# Patient Record
Sex: FEMALE | Race: BLACK OR AFRICAN AMERICAN | NOT HISPANIC OR LATINO | Employment: OTHER | URBAN - METROPOLITAN AREA
[De-identification: names, ages, dates, MRNs, and addresses within clinical notes are randomized per-mention and may not be internally consistent; named-entity substitution may affect disease eponyms.]

---

## 2023-07-26 ENCOUNTER — OFFICE VISIT (OUTPATIENT)
Dept: PRIMARY CARE | Facility: CLINIC | Age: 53
End: 2023-07-26
Payer: MEDICARE

## 2023-07-26 VITALS
DIASTOLIC BLOOD PRESSURE: 70 MMHG | HEART RATE: 69 BPM | SYSTOLIC BLOOD PRESSURE: 127 MMHG | BODY MASS INDEX: 34.83 KG/M2 | WEIGHT: 204 LBS | HEIGHT: 64 IN

## 2023-07-26 DIAGNOSIS — I38 SYSTOLIC CONGESTIVE HEART FAILURE DUE TO VALVULAR DISEASE (MULTI): ICD-10-CM

## 2023-07-26 DIAGNOSIS — I50.20 SYSTOLIC CONGESTIVE HEART FAILURE DUE TO VALVULAR DISEASE (MULTI): ICD-10-CM

## 2023-07-26 DIAGNOSIS — I25.10 CORONARY ARTERY DISEASE WITHOUT ANGINA PECTORIS, UNSPECIFIED VESSEL OR LESION TYPE, UNSPECIFIED WHETHER NATIVE OR TRANSPLANTED HEART: Primary | ICD-10-CM

## 2023-07-26 DIAGNOSIS — E66.01 MORBID OBESITY (MULTI): ICD-10-CM

## 2023-07-26 DIAGNOSIS — E11.9 TYPE 2 DIABETES MELLITUS WITHOUT COMPLICATION, WITHOUT LONG-TERM CURRENT USE OF INSULIN (MULTI): ICD-10-CM

## 2023-07-26 DIAGNOSIS — M32.19 SYSTEMIC LUPUS ERYTHEMATOSUS WITH OTHER ORGAN INVOLVEMENT, UNSPECIFIED SLE TYPE (MULTI): ICD-10-CM

## 2023-07-26 DIAGNOSIS — Z12.11 SCREEN FOR COLON CANCER: ICD-10-CM

## 2023-07-26 PROBLEM — E83.42 HYPOMAGNESEMIA: Status: ACTIVE | Noted: 2023-07-26

## 2023-07-26 PROBLEM — I11.9 HYPERTENSIVE HEART DISEASE WITHOUT CONGESTIVE HEART FAILURE: Status: ACTIVE | Noted: 2023-07-26

## 2023-07-26 PROBLEM — I70.213 INTERMITTENT CLAUDICATION OF BOTH LOWER EXTREMITIES DUE TO ATHEROSCLEROSIS (CMS-HCC): Status: ACTIVE | Noted: 2023-07-26

## 2023-07-26 PROBLEM — M32.9 SYSTEMIC LUPUS ERYTHEMATOSUS (MULTI): Status: ACTIVE | Noted: 2023-07-26

## 2023-07-26 PROBLEM — E78.2 MIXED HYPERLIPIDEMIA: Status: ACTIVE | Noted: 2023-07-26

## 2023-07-26 PROBLEM — I20.9 ANGINA PECTORIS (CMS-HCC): Status: ACTIVE | Noted: 2023-07-26

## 2023-07-26 PROBLEM — I65.23 OCCLUSION AND STENOSIS OF BILATERAL CAROTID ARTERIES: Status: ACTIVE | Noted: 2023-07-26

## 2023-07-26 PROCEDURE — 1036F TOBACCO NON-USER: CPT | Performed by: INTERNAL MEDICINE

## 2023-07-26 PROCEDURE — 3074F SYST BP LT 130 MM HG: CPT | Performed by: INTERNAL MEDICINE

## 2023-07-26 PROCEDURE — 3078F DIAST BP <80 MM HG: CPT | Performed by: INTERNAL MEDICINE

## 2023-07-26 PROCEDURE — 4010F ACE/ARB THERAPY RXD/TAKEN: CPT | Performed by: INTERNAL MEDICINE

## 2023-07-26 PROCEDURE — 99204 OFFICE O/P NEW MOD 45 MIN: CPT | Performed by: INTERNAL MEDICINE

## 2023-07-26 RX ORDER — ASPIRIN 81 MG/1
81 TABLET ORAL DAILY
COMMUNITY
Start: 2023-06-09 | End: 2023-08-18 | Stop reason: SDUPTHER

## 2023-07-26 RX ORDER — HYDRALAZINE HYDROCHLORIDE 25 MG/1
25 TABLET, FILM COATED ORAL 2 TIMES DAILY
COMMUNITY
Start: 2021-07-12 | End: 2023-08-09 | Stop reason: SDUPTHER

## 2023-07-26 RX ORDER — CETIRIZINE HYDROCHLORIDE 10 MG/1
10 TABLET ORAL DAILY PRN
COMMUNITY
Start: 2023-06-27 | End: 2024-03-13 | Stop reason: WASHOUT

## 2023-07-26 RX ORDER — PANTOPRAZOLE SODIUM 40 MG/1
1 TABLET, DELAYED RELEASE ORAL DAILY
COMMUNITY
Start: 2018-06-28 | End: 2024-01-29 | Stop reason: ALTCHOICE

## 2023-07-26 RX ORDER — METOPROLOL SUCCINATE 100 MG/1
1 TABLET, EXTENDED RELEASE ORAL DAILY
COMMUNITY
Start: 2018-06-28 | End: 2023-08-18 | Stop reason: SDUPTHER

## 2023-07-26 RX ORDER — ATORVASTATIN CALCIUM 80 MG/1
80 TABLET, FILM COATED ORAL
COMMUNITY
Start: 2019-04-30 | End: 2023-09-08 | Stop reason: SDUPTHER

## 2023-07-26 RX ORDER — LISINOPRIL 30 MG/1
30 TABLET ORAL DAILY
COMMUNITY
Start: 2019-05-07 | End: 2023-09-08 | Stop reason: SDUPTHER

## 2023-07-26 RX ORDER — NITROGLYCERIN 0.4 MG/1
0.4 TABLET SUBLINGUAL EVERY 5 MIN PRN
COMMUNITY
Start: 2019-05-07

## 2023-07-26 ASSESSMENT — PATIENT HEALTH QUESTIONNAIRE - PHQ9
SUM OF ALL RESPONSES TO PHQ9 QUESTIONS 1 AND 2: 0
2. FEELING DOWN, DEPRESSED OR HOPELESS: NOT AT ALL
1. LITTLE INTEREST OR PLEASURE IN DOING THINGS: NOT AT ALL

## 2023-07-26 NOTE — ASSESSMENT & PLAN NOTE
Status post PCI status post MI with reduced ejection fraction mild ischemic cardiomyopathy.  We will recheck echocardiogram.  Advise low-salt diet.  Continue current cardiac regimen.  Referral to cardiology.  Discussed possibly starting Jardiance however she is deferring at this time.  We will check A1c.  Advise daily weights.  She is currently not on Lasix for unclear reasons but will await on results of the echocardiogram

## 2023-07-26 NOTE — PROGRESS NOTES
"Subjective   Patient ID: Lory Taylor is a 52 y.o. female who presents for [facility].    HPI     Patient is a 52-year-old female with past medical history of diabetes mellitus type 2 hypertension GERD systolic congestive heart failure status post stenting with reduced ejection fraction of 45 to 50% who presents to establish care.  Patient was previously following with a private physician.    Patient has diabetes mellitus type 2.  Has not had an A1c done in more than a year.  Last A1c was greater than 7.  She is currently not on any diabetic medications.  She eats a low carbohydrate diet and denies increased thirst or increased urination.  No numbness or tingling of the extremities.  She does not follow with endocrinology.     Systolic congestive heart failure.  Patient is status post MI status post stenting.  She is currently on high-dose statin as well as ACE inhibitor and beta-blocker.  She is looking for a new cardiologist.  She denies any shortness of breath orthopnea or leg swelling at this time.  She states that she is illuminated salt from her diet.    She has a reported history of lupus in her chart and its unclear when or how she was diagnosed with lupus.  She has not had any lab work to suggest lupus.    Obesity with BMI of greater than 35.          Review of Systems  Constitutional: No fever or chills  Cardiovascular: no chest pain, no palpitations and no syncope.   Respiratory: no cough, no shortness of breath during exertion and no shortness of breath at rest.   Gastrointestinal: no abdominal pain, no nausea and no vomiting.  Neuro: No Headache, no dizziness    Objective   /70   Pulse 69   Ht 1.626 m (5' 4\")   Wt 92.5 kg (204 lb)   BMI 35.02 kg/m²     Physical Exam  Constitutional: Alert and in no acute distress. Well developed, well nourished  Head and Face: Head and face: Normal.    Cardiovascular: Heart rate and rhythm were normal, normal S1 and S2. No peripheral edema. "   Pulmonary: No respiratory distress. Clear bilateral breath sounds.  Musculoskeletal: Gait and station: Normal. Muscle strength/tone: Normal.   Skin: Normal skin color and pigmentation, normal skin turgor, and no rash.    Psychiatric: Judgment and insight: Intact. Mood and affect: Normal.        Lab Results   Component Value Date    WBC 5.5 12/06/2022    HGB 13.7 12/06/2022    HCT 43.6 12/06/2022     12/06/2022    CHOL 128 03/09/2021    TRIG 34 03/09/2021    HDL 51.6 03/09/2021    ALT 23 12/06/2022    AST 20 12/06/2022     12/06/2022    K 3.5 12/06/2022     12/06/2022    CREATININE 0.86 12/06/2022    BUN 22 12/06/2022    CO2 25 12/06/2022    TSH 2.51 08/20/2022    INR 0.9 06/26/2018    HGBA1C 7.5 03/09/2021       CT sinus wo IV contrast  Narrative: Interpreted By:  MICHAEL SINGH MD and VIRGIL ROPER MD  MRN: [id number]  Patient Name: QUINTIN SMITH     STUDY:  CT SINUS WO CONTRAST     INDICATION:  sinus pressure  J34.89: Sinus pressure     COMPARISON:  None     ACCESSION NUMBER(S):  [id number]     ORDERING CLINICIAN:  WESTON LU     TECHNIQUE:  CT examination of the paranasal sinuses was performed utilizing the  Scopele[employer] protocol. These images were used to create axial, coronal  and sagittal reformatted images through the paranasal sinuses. No  intravenous contrast was administered. The images were reviewed in  bone and soft-tissue windows.     FINDINGS:  Sinocranial & sinoorbital junctions: The lamina papyracea,  cribriform plates and fovea ethmoidalis are intact.     Nasal septum and nasal cavity: Leftward deviation of the nasal septum.     Frontal sinuses, drainage pathways, and associated anatomic variants:  The frontal sinuses are well developed. The frontal sinuses and  frontal sinus outflow tracts are clear.     Ethmoid sinuses: The ethmoid air cells are free of mucosal disease.     Sphenoid sinuses, drainage pathways, and associated variants: The  sphenoid sinuses are clear. The  sphenoethmoidal recesses are free of  abnormal soft tissue bilaterally.   The carotid canals are covered by  bone.     Maxillary sinuses, drainage pathways, and associated variants: The  maxillary sinuses are well developed with polypoid mucosal thickening  noted in the alveolar recesses bilaterally.. The ostiomeatal units  are free of mucosal disease.     Other:  Enlarged sella turcica of uncertain cause without suprasellar  extension.  Orbits: Normal  Mastoid air cells: Normal     Impression: No air-fluid levels, mucosal thickening or bony erosions of the  paranasal sinuses.     I personally reviewed the images/study and I agree with the findings  as stated. This study was interpreted at [place], [place].            Assessment/Plan   Problem List Items Addressed This Visit          Cardiac and Vasculature    Coronary artery disease without angina pectoris - Primary     Status post PCI status post MI with reduced ejection fraction mild ischemic cardiomyopathy.  We will recheck echocardiogram.  Advise low-salt diet.  Continue current cardiac regimen.  Referral to cardiology.  Discussed possibly starting Jardiance however she is deferring at this time.  We will check A1c.  Advise daily weights.  She is currently not on Lasix for unclear reasons but will await on results of the echocardiogram         Relevant Medications    nitroglycerin (Nitrostat) 0.4 mg SL tablet    metoprolol succinate XL (Toprol-XL) 100 mg 24 hr tablet    Other Relevant Orders    Referral to Cardiology    TRINIDAD with Reflex to MARLENE    TSH with reflex to Free T4 if abnormal    Urinalysis with Reflex Microscopic    Cortisol    Referral to Nutrition Services       Endocrine/Metabolic    Type 2 diabetes mellitus without complication (CMS/HCC)     Last A1c uncontrolled.  Recheck A1c as well as urine albumin.  Advise low carbohydrate diet.  Deferring pneumonia vaccine at this time.  Advise annual eye  exams.  We will consider adding medications pending results of A1c.  Return to clinic 1 month for reevaluation         Relevant Orders    TRINIDAD with Reflex to MARLENE    Hemoglobin A1C    Albumin , Urine Random    TSH with reflex to Free T4 if abnormal    Urinalysis with Reflex Microscopic    Cortisol    Referral to Nutrition Services    Morbid obesity (CMS/[facility])     Encouraged weight reduction and regular exercise as well as low calorie diet.  Discussed initiation of a GLP-1 agonist however deferring at this time.  Referral to dietitian.         Relevant Orders    TRINIDAD with Reflex to MARLENE    TSH with reflex to Free T4 if abnormal    Urinalysis with Reflex Microscopic    Cortisol    Referral to Nutrition Services       Multi-system (Lupus, Sarcoid)    Systemic lupus erythematosus (CMS/[facility])     This is a very unclear diagnosis.  We will check TRINIDAD as well as urinalysis.  Can consider referral to rheumatology if indicated         Relevant Orders    TRINIDAD with Reflex to MARLENE    TSH with reflex to Free T4 if abnormal    Urinalysis with Reflex Microscopic    Cortisol    Referral to Nutrition Services     Other Visit Diagnoses       Systolic congestive heart failure due to valvular disease (CMS/[facility])        Relevant Medications    nitroglycerin (Nitrostat) 0.4 mg SL tablet    metoprolol succinate XL (Toprol-XL) 100 mg 24 hr tablet    Other Relevant Orders    Transthoracic Echo (TTE) Complete    TSH with reflex to Free T4 if abnormal    Urinalysis with Reflex Microscopic    Cortisol    Referral to Nutrition Services    Screen for colon cancer        Relevant Orders    Cologuard® colon cancer screening    TSH with reflex to Free T4 if abnormal    Urinalysis with Reflex Microscopic    Cortisol    Referral to Nutrition Services

## 2023-07-26 NOTE — ASSESSMENT & PLAN NOTE
This is a very unclear diagnosis.  We will check TRINIDAD as well as urinalysis.  Can consider referral to rheumatology if indicated

## 2023-07-26 NOTE — ASSESSMENT & PLAN NOTE
Encouraged weight reduction and regular exercise as well as low calorie diet.  Discussed initiation of a GLP-1 agonist however deferring at this time.  Referral to dietitian.

## 2023-07-26 NOTE — ASSESSMENT & PLAN NOTE
Last A1c uncontrolled.  Recheck A1c as well as urine albumin.  Advise low carbohydrate diet.  Deferring pneumonia vaccine at this time.  Advise annual eye exams.  We will consider adding medications pending results of A1c.  Return to clinic 1 month for reevaluation

## 2023-07-27 ENCOUNTER — APPOINTMENT (OUTPATIENT)
Dept: PRIMARY CARE | Facility: CLINIC | Age: 53
End: 2023-07-27
Payer: MEDICARE

## 2023-07-27 LAB
CORTISOL (UG/DL) IN SERUM: 15.4 UG/DL (ref 2.5–20)
ESTIMATED AVERAGE GLUCOSE FOR HBA1C: 128 MG/DL
HEMOGLOBIN A1C/HEMOGLOBIN TOTAL IN BLOOD: 6.1 %
THYROTROPIN (MIU/L) IN SER/PLAS BY DETECTION LIMIT <= 0.05 MIU/L: 2.13 MIU/L (ref 0.44–3.98)

## 2023-07-27 PROCEDURE — 86039 ANTINUCLEAR ANTIBODIES (ANA): CPT

## 2023-07-27 PROCEDURE — 86235 NUCLEAR ANTIGEN ANTIBODY: CPT

## 2023-07-27 PROCEDURE — 82570 ASSAY OF URINE CREATININE: CPT

## 2023-07-27 PROCEDURE — 82533 TOTAL CORTISOL: CPT

## 2023-07-27 PROCEDURE — 83036 HEMOGLOBIN GLYCOSYLATED A1C: CPT

## 2023-07-27 PROCEDURE — 86038 ANTINUCLEAR ANTIBODIES: CPT

## 2023-07-27 PROCEDURE — 86225 DNA ANTIBODY NATIVE: CPT

## 2023-07-27 PROCEDURE — 82043 UR ALBUMIN QUANTITATIVE: CPT

## 2023-07-27 PROCEDURE — 84443 ASSAY THYROID STIM HORMONE: CPT

## 2023-07-28 LAB
ANA PATTERN: ABNORMAL
ANA TITER: ABNORMAL
ANTI-CENTROMERE: <0.2 AI
ANTI-CHROMATIN: <0.2 AI
ANTI-DNA (DS): <1 IU/ML
ANTI-JO-1 IGG: <0.2 AI
ANTI-NUCLEAR ANTIBODY (ANA): POSITIVE
ANTI-RIBOSOMAL P: <0.2 AI
ANTI-RNP: <0.2 AI
ANTI-SCL-70: <0.2 AI
ANTI-SM/RNP: <0.2 AI
ANTI-SM: <0.2 AI
ANTI-SSA: <0.2 AI
ANTI-SSB: <0.2 AI

## 2023-07-29 LAB
ALBUMIN (MG/L) IN URINE: 7.2 MG/L
ALBUMIN/CREATININE (UG/MG) IN URINE: 8.9 UG/MG CRT (ref 0–30)
APPEARANCE, URINE: NORMAL
ASCORBIC ACID: NORMAL MG/DL
BILIRUBIN, URINE: NORMAL
BLOOD, URINE: NORMAL
COLOR, URINE: NORMAL
CREATININE (MG/DL) IN URINE: 81.3 MG/DL (ref 20–320)
GLUCOSE, URINE: NORMAL
KETONES, URINE: NORMAL
LEUKOCYTE ESTERASE, URINE: NORMAL
NITRITE, URINE: NORMAL
PH, URINE: NORMAL
PROTEIN, URINE: NORMAL
SPECIFIC GRAVITY, URINE: NORMAL
UROBILINOGEN, URINE: NORMAL

## 2023-08-09 DIAGNOSIS — I10 PRIMARY HYPERTENSION: ICD-10-CM

## 2023-08-09 RX ORDER — HYDRALAZINE HYDROCHLORIDE 25 MG/1
25 TABLET, FILM COATED ORAL 2 TIMES DAILY
Qty: 180 TABLET | Refills: 2 | Status: SHIPPED | OUTPATIENT
Start: 2023-08-09 | End: 2024-05-14

## 2023-08-16 LAB — NONINV COLON CA DNA+OCC BLD SCRN STL QL: NEGATIVE

## 2023-08-18 DIAGNOSIS — I10 PRIMARY HYPERTENSION: Primary | ICD-10-CM

## 2023-08-21 RX ORDER — ASPIRIN 81 MG/1
81 TABLET ORAL DAILY
Qty: 30 TABLET | Refills: 0 | Status: SHIPPED | OUTPATIENT
Start: 2023-08-21 | End: 2023-09-20

## 2023-08-21 RX ORDER — METOPROLOL SUCCINATE 100 MG/1
100 TABLET, EXTENDED RELEASE ORAL DAILY
Qty: 30 TABLET | Refills: 0 | Status: SHIPPED | OUTPATIENT
Start: 2023-08-21 | End: 2023-09-15

## 2023-09-08 DIAGNOSIS — I10 PRIMARY HYPERTENSION: ICD-10-CM

## 2023-09-08 DIAGNOSIS — E78.2 MIXED HYPERLIPIDEMIA: ICD-10-CM

## 2023-09-08 RX ORDER — ATORVASTATIN CALCIUM 80 MG/1
80 TABLET, FILM COATED ORAL
Qty: 30 TABLET | Refills: 0 | Status: SHIPPED | OUTPATIENT
Start: 2023-09-08 | End: 2023-10-06

## 2023-09-08 RX ORDER — LISINOPRIL 30 MG/1
30 TABLET ORAL DAILY
Qty: 90 TABLET | Refills: 1 | Status: SHIPPED | OUTPATIENT
Start: 2023-09-08 | End: 2024-02-19

## 2023-09-13 DIAGNOSIS — I10 PRIMARY HYPERTENSION: ICD-10-CM

## 2023-09-15 RX ORDER — METOPROLOL SUCCINATE 100 MG/1
100 TABLET, EXTENDED RELEASE ORAL DAILY
Qty: 90 TABLET | Refills: 2 | Status: SHIPPED | OUTPATIENT
Start: 2023-09-15 | End: 2024-06-07

## 2023-10-06 DIAGNOSIS — E78.2 MIXED HYPERLIPIDEMIA: ICD-10-CM

## 2023-10-06 RX ORDER — ATORVASTATIN CALCIUM 80 MG/1
80 TABLET, FILM COATED ORAL
Qty: 30 TABLET | Refills: 0 | Status: SHIPPED | OUTPATIENT
Start: 2023-10-06 | End: 2023-11-07

## 2023-10-24 ENCOUNTER — APPOINTMENT (OUTPATIENT)
Dept: PRIMARY CARE | Facility: CLINIC | Age: 53
End: 2023-10-24
Payer: MEDICARE

## 2023-10-27 ENCOUNTER — APPOINTMENT (OUTPATIENT)
Dept: NUTRITION | Facility: CLINIC | Age: 53
End: 2023-10-27
Payer: MEDICARE

## 2023-11-06 DIAGNOSIS — E78.2 MIXED HYPERLIPIDEMIA: ICD-10-CM

## 2023-11-07 RX ORDER — ATORVASTATIN CALCIUM 80 MG/1
80 TABLET, FILM COATED ORAL DAILY
Qty: 30 TABLET | Refills: 0 | Status: SHIPPED | OUTPATIENT
Start: 2023-11-07 | End: 2023-11-27

## 2023-11-23 DIAGNOSIS — E78.2 MIXED HYPERLIPIDEMIA: ICD-10-CM

## 2023-11-27 RX ORDER — ATORVASTATIN CALCIUM 80 MG/1
80 TABLET, FILM COATED ORAL DAILY
Qty: 30 TABLET | Refills: 0 | Status: SHIPPED | OUTPATIENT
Start: 2023-11-27 | End: 2024-01-07

## 2023-11-28 ENCOUNTER — APPOINTMENT (OUTPATIENT)
Dept: RADIOLOGY | Facility: HOSPITAL | Age: 53
End: 2023-11-28
Payer: MEDICARE

## 2023-11-28 ENCOUNTER — HOSPITAL ENCOUNTER (EMERGENCY)
Facility: HOSPITAL | Age: 53
Discharge: HOME | End: 2023-11-29
Payer: MEDICARE

## 2023-11-28 VITALS
HEIGHT: 63 IN | OXYGEN SATURATION: 96 % | DIASTOLIC BLOOD PRESSURE: 68 MMHG | SYSTOLIC BLOOD PRESSURE: 128 MMHG | RESPIRATION RATE: 18 BRPM | TEMPERATURE: 98.6 F | BODY MASS INDEX: 37.21 KG/M2 | HEART RATE: 56 BPM | WEIGHT: 210 LBS

## 2023-11-28 DIAGNOSIS — R51.9 CHRONIC NONINTRACTABLE HEADACHE, UNSPECIFIED HEADACHE TYPE: ICD-10-CM

## 2023-11-28 DIAGNOSIS — I10 HYPERTENSION, UNSPECIFIED TYPE: Primary | ICD-10-CM

## 2023-11-28 DIAGNOSIS — H93.13 TINNITUS OF BOTH EARS: ICD-10-CM

## 2023-11-28 DIAGNOSIS — G89.29 CHRONIC NONINTRACTABLE HEADACHE, UNSPECIFIED HEADACHE TYPE: ICD-10-CM

## 2023-11-28 PROBLEM — I50.20 ACC/AHA STAGE C SYSTOLIC HEART FAILURE (MULTI): Status: ACTIVE | Noted: 2023-11-28

## 2023-11-28 PROBLEM — H25.813 COMBINED FORM OF AGE-RELATED CATARACT, BOTH EYES: Status: ACTIVE | Noted: 2023-11-28

## 2023-11-28 PROBLEM — I25.10 CAD S/P PERCUTANEOUS CORONARY ANGIOPLASTY: Status: ACTIVE | Noted: 2023-11-28

## 2023-11-28 PROBLEM — H93.19 TINNITUS: Status: ACTIVE | Noted: 2023-11-28

## 2023-11-28 PROBLEM — R26.89 IMBALANCE: Status: ACTIVE | Noted: 2023-11-28

## 2023-11-28 PROBLEM — Z98.61 CAD S/P PERCUTANEOUS CORONARY ANGIOPLASTY: Status: ACTIVE | Noted: 2023-11-28

## 2023-11-28 PROBLEM — R06.83 SNORING: Status: ACTIVE | Noted: 2023-11-28

## 2023-11-28 LAB
ALBUMIN SERPL BCP-MCNC: 4.2 G/DL (ref 3.4–5)
ALP SERPL-CCNC: 50 U/L (ref 33–110)
ALT SERPL W P-5'-P-CCNC: 21 U/L (ref 7–45)
ANION GAP SERPL CALC-SCNC: 11 MMOL/L (ref 10–20)
AST SERPL W P-5'-P-CCNC: 22 U/L (ref 9–39)
BASOPHILS # BLD AUTO: 0.03 X10*3/UL (ref 0–0.1)
BASOPHILS NFR BLD AUTO: 0.4 %
BILIRUB SERPL-MCNC: 0.5 MG/DL (ref 0–1.2)
BNP SERPL-MCNC: 30 PG/ML (ref 0–99)
BUN SERPL-MCNC: 18 MG/DL (ref 6–23)
CALCIUM SERPL-MCNC: 8.9 MG/DL (ref 8.6–10.3)
CARDIAC TROPONIN I PNL SERPL HS: 5 NG/L (ref 0–13)
CHLORIDE SERPL-SCNC: 101 MMOL/L (ref 98–107)
CO2 SERPL-SCNC: 27 MMOL/L (ref 21–32)
CREAT SERPL-MCNC: 0.79 MG/DL (ref 0.5–1.05)
EOSINOPHIL # BLD AUTO: 0.11 X10*3/UL (ref 0–0.7)
EOSINOPHIL NFR BLD AUTO: 1.5 %
ERYTHROCYTE [DISTWIDTH] IN BLOOD BY AUTOMATED COUNT: 12.7 % (ref 11.5–14.5)
GFR SERPL CREATININE-BSD FRML MDRD: 90 ML/MIN/1.73M*2
GLUCOSE SERPL-MCNC: 120 MG/DL (ref 74–99)
HCT VFR BLD AUTO: 45.8 % (ref 36–46)
HGB BLD-MCNC: 14.4 G/DL (ref 12–16)
IMM GRANULOCYTES # BLD AUTO: 0.01 X10*3/UL (ref 0–0.7)
IMM GRANULOCYTES NFR BLD AUTO: 0.1 % (ref 0–0.9)
LYMPHOCYTES # BLD AUTO: 1.63 X10*3/UL (ref 1.2–4.8)
LYMPHOCYTES NFR BLD AUTO: 22.5 %
MCH RBC QN AUTO: 31 PG (ref 26–34)
MCHC RBC AUTO-ENTMCNC: 31.4 G/DL (ref 32–36)
MCV RBC AUTO: 99 FL (ref 80–100)
MONOCYTES # BLD AUTO: 0.39 X10*3/UL (ref 0.1–1)
MONOCYTES NFR BLD AUTO: 5.4 %
NEUTROPHILS # BLD AUTO: 5.06 X10*3/UL (ref 1.2–7.7)
NEUTROPHILS NFR BLD AUTO: 70.1 %
NRBC BLD-RTO: 0 /100 WBCS (ref 0–0)
PLATELET # BLD AUTO: 194 X10*3/UL (ref 150–450)
POTASSIUM SERPL-SCNC: 3.8 MMOL/L (ref 3.5–5.3)
PROT SERPL-MCNC: 7.2 G/DL (ref 6.4–8.2)
RBC # BLD AUTO: 4.65 X10*6/UL (ref 4–5.2)
SODIUM SERPL-SCNC: 135 MMOL/L (ref 136–145)
WBC # BLD AUTO: 7.2 X10*3/UL (ref 4.4–11.3)

## 2023-11-28 PROCEDURE — 36415 COLL VENOUS BLD VENIPUNCTURE: CPT | Performed by: PHYSICIAN ASSISTANT

## 2023-11-28 PROCEDURE — 84484 ASSAY OF TROPONIN QUANT: CPT | Performed by: PHYSICIAN ASSISTANT

## 2023-11-28 PROCEDURE — 2500000001 HC RX 250 WO HCPCS SELF ADMINISTERED DRUGS (ALT 637 FOR MEDICARE OP): Performed by: PHYSICIAN ASSISTANT

## 2023-11-28 PROCEDURE — 71046 X-RAY EXAM CHEST 2 VIEWS: CPT | Performed by: RADIOLOGY

## 2023-11-28 PROCEDURE — 85025 COMPLETE CBC W/AUTO DIFF WBC: CPT | Performed by: PHYSICIAN ASSISTANT

## 2023-11-28 PROCEDURE — 70450 CT HEAD/BRAIN W/O DYE: CPT | Performed by: RADIOLOGY

## 2023-11-28 PROCEDURE — 83880 ASSAY OF NATRIURETIC PEPTIDE: CPT | Performed by: PHYSICIAN ASSISTANT

## 2023-11-28 PROCEDURE — 71046 X-RAY EXAM CHEST 2 VIEWS: CPT | Mod: FY

## 2023-11-28 PROCEDURE — 70450 CT HEAD/BRAIN W/O DYE: CPT

## 2023-11-28 PROCEDURE — 80053 COMPREHEN METABOLIC PANEL: CPT | Performed by: PHYSICIAN ASSISTANT

## 2023-11-28 PROCEDURE — 99284 EMERGENCY DEPT VISIT MOD MDM: CPT | Mod: 25

## 2023-11-28 PROCEDURE — 2500000004 HC RX 250 GENERAL PHARMACY W/ HCPCS (ALT 636 FOR OP/ED): Performed by: PHYSICIAN ASSISTANT

## 2023-11-28 PROCEDURE — 96374 THER/PROPH/DIAG INJ IV PUSH: CPT

## 2023-11-28 RX ORDER — KETOROLAC TROMETHAMINE 30 MG/ML
30 INJECTION, SOLUTION INTRAMUSCULAR; INTRAVENOUS ONCE
Status: COMPLETED | OUTPATIENT
Start: 2023-11-28 | End: 2023-11-28

## 2023-11-28 RX ORDER — HYDROXYZINE HYDROCHLORIDE 25 MG/1
25 TABLET, FILM COATED ORAL EVERY 8 HOURS PRN
Qty: 12 TABLET | Refills: 0 | Status: SHIPPED | OUTPATIENT
Start: 2023-11-28

## 2023-11-28 RX ORDER — METOCLOPRAMIDE 10 MG/1
10 TABLET ORAL ONCE
Status: COMPLETED | OUTPATIENT
Start: 2023-11-28 | End: 2023-11-28

## 2023-11-28 RX ORDER — HYDROXYZINE PAMOATE 25 MG/1
25 CAPSULE ORAL ONCE
Status: COMPLETED | OUTPATIENT
Start: 2023-11-28 | End: 2023-11-28

## 2023-11-28 RX ORDER — ONDANSETRON 4 MG/1
4 TABLET, ORALLY DISINTEGRATING ORAL EVERY 8 HOURS PRN
Qty: 10 TABLET | Refills: 0 | Status: SHIPPED | OUTPATIENT
Start: 2023-11-28 | End: 2024-02-13 | Stop reason: WASHOUT

## 2023-11-28 RX ADMIN — METOCLOPRAMIDE 10 MG: 10 TABLET ORAL at 22:15

## 2023-11-28 RX ADMIN — HYDROXYZINE PAMOATE 25 MG: 25 CAPSULE ORAL at 22:15

## 2023-11-28 RX ADMIN — KETOROLAC TROMETHAMINE 30 MG: 30 INJECTION INTRAMUSCULAR; INTRAVENOUS at 22:15

## 2023-11-28 ASSESSMENT — COLUMBIA-SUICIDE SEVERITY RATING SCALE - C-SSRS
1. IN THE PAST MONTH, HAVE YOU WISHED YOU WERE DEAD OR WISHED YOU COULD GO TO SLEEP AND NOT WAKE UP?: NO
2. HAVE YOU ACTUALLY HAD ANY THOUGHTS OF KILLING YOURSELF?: NO
6. HAVE YOU EVER DONE ANYTHING, STARTED TO DO ANYTHING, OR PREPARED TO DO ANYTHING TO END YOUR LIFE?: NO

## 2023-11-28 ASSESSMENT — PAIN SCALES - GENERAL
PAINLEVEL_OUTOF10: 3
PAINLEVEL_OUTOF10: 7
PAINLEVEL_OUTOF10: 0 - NO PAIN

## 2023-11-28 ASSESSMENT — PAIN - FUNCTIONAL ASSESSMENT
PAIN_FUNCTIONAL_ASSESSMENT: 0-10
PAIN_FUNCTIONAL_ASSESSMENT: 0-10

## 2023-11-28 ASSESSMENT — PAIN DESCRIPTION - LOCATION: LOCATION: HEAD

## 2023-11-29 NOTE — ED TRIAGE NOTES
"Pt c/o high blood pressure and feeling dizzy. States the elevated BP has been going on for \"quite some time, but higher today\". Pt reports the dizziness has also been intermittent for some time, but states the dizziness is present rather the pressure is high or normal. Pt reports taking her BP meds, without missing doses. Pt is anxious appearing, and reports feeling \"jittery and nervous\". States she is also having some sinus pressure and sinus headache as well. Denies taking any OTC cough/cold medication. Pt also reports intermittent tinnitus in her ears, states mostly in the left ear, but does switch ears.    "

## 2023-11-29 NOTE — DISCHARGE INSTRUCTIONS
Please follow-up with neurology for further evaluation of chronic headache.  I have placed referral.  Someone should be reaching out to you to schedule appointment.  Also including phone number if you want a call and make appointment yourself.  I would discuss obtaining MRI with neurology and/or your primary care provider.  Either can place the order.  May take Atarax/Vistaril as needed for headaches, anxiety, itching and sinus/tinnitus.  May take Zofran as needed for nausea.    Follow-up with primary care provider regarding blood pressure.  Talk to your primary care provider about testing your thyroid, testing hormones for postmenopausal woman, testing for vitamin deficiency.  Return to nearest ER for any new or worsening symptoms.

## 2023-11-29 NOTE — ED TRIAGE NOTES
TRIAGE NOTE   I saw the patient as the Clinician in Triage and performed a brief history and physical exam, established acuity, and ordered appropriate tests to develop basic plan of care. Patient will be seen by an ANA, resident and/or physician who will independently evaluate the patient. Please see subsequent provider notes for further details and disposition.     Brief HPI: In brief, Lory Taylor is a 53 y.o. female that presents for elevated blood pressure readings headache chest pain dizziness.  Reports she is compliant with her blood pressure medications.  Has had prior issues with headaches has been seen by ENT.  No history of migraine TIA or stroke.  Has had prior coronary artery disease with MI.  Not on anticoagulants..     Focused Physical exam:   Ambulatory with steady gait.  Nonfocal neurologic exam.  Cardiovascular rate and rhythm.  Lungs auscultation.    Plan/MDM:   CT head cardiac evaluation.  Elevated blood pressure reading with history of hypertension.    Please see subsequent provider note for further details and disposition

## 2023-11-29 NOTE — ED PROVIDER NOTES
Chief Complaint   Patient presents with    Hypertension     HPI:   Lory Taylor is an 53 y.o. female with complicated PMH that includes T2DM, HTN, HLD, CAD (MI 2018, s/p stent, not on anticoagulants), HFpEF (echo 07/2023 EF 60 to 65%), SLE the presents to the ED with  today for evaluation of hypertension.  Patient says she checks her blood pressure frequently.  Today it was 191/97.  Usually it is in the 1 teens over 70s.  She became worried and presented to the ED.  Patient also states that she has pressure behind her eyes and a 7/10 headache.  This has been going on daily since at least spring.  She says she has seen ENT multiple times.  They did a CT scan and told her she had a deviated septum but they cannot find anything else.  She says occasionally she gets pulsatile tinnitus but daily she gets regular loud beeping tinnitus.  They cannot tell her what is going on that either.  She says she has no vision changes, speech changes, dizziness, lightheadedness, focal weakness.  She has had her eyes checked within the past year and the doctor told her everything was normal including her retinal exam.  She felt complaining of chest pressure.  She said that this is intermittent and has been happening off and on for months.  Seems to be related to when she sits up and localizes it to her left chest.  No change in the symptoms today she is just worried because she has had a history of a heart attack in the past.  She said last time she had a heart attack her symptoms were confusion nausea and vomiting.  She tells me she does not feel like that today.  She has not been taking any medication for any of her symptoms because she is nervous about what would interact with her daily meds.  She thinks maybe this may be related to being postmenopausal.  She recently saw her gynecologist and said she told gynecologist her symptoms but they do not give her any meds or tell her what to do about it.  Patient denies current  chest pain, shortness of breath, heart racing, leg swelling, hemoptysis, ear pain, dizziness or lightheadedness, fever, chills, abdominal pain, dysuria, hematuria.    Medications: hydralazine, ASA, statin, lisinopril, metoprolol  Soc HX: Denies substance use  Allergies   Allergen Reactions    Penicillins Unknown     Told as a baby had a reaction   :  History reviewed. No pertinent past medical history.  History reviewed. No pertinent surgical history.  No family history on file.     Physical Exam  Vitals and nursing note reviewed.   Constitutional:       General: She is not in acute distress.     Appearance: Normal appearance. She is not ill-appearing or toxic-appearing.      Comments: Elevated BMI.  Anxious appearing.   HENT:      Head: Normocephalic and atraumatic.      Right Ear: Tympanic membrane, ear canal and external ear normal.      Left Ear: Tympanic membrane, ear canal and external ear normal.   Eyes:      Pupils: Pupils are equal, round, and reactive to light.   Cardiovascular:      Rate and Rhythm: Normal rate and regular rhythm.      Pulses: Normal pulses.      Heart sounds: No murmur heard.  Pulmonary:      Effort: Pulmonary effort is normal. No respiratory distress.      Breath sounds: Normal breath sounds.   Abdominal:      General: Bowel sounds are normal.      Palpations: Abdomen is soft.      Tenderness: There is no abdominal tenderness. There is no guarding or rebound.   Musculoskeletal:         General: No deformity or signs of injury. Normal range of motion.   Skin:     General: Skin is warm and dry.      Capillary Refill: Capillary refill takes less than 2 seconds.      Findings: No bruising or rash.   Neurological:      General: No focal deficit present.      Mental Status: She is alert.      Cranial Nerves: No cranial nerve deficit.   Psychiatric:      Comments: Anxious appearing.  Denies SI/HI     VS: As documented in the triage note and EMR flowsheet from this visit were reviewed.    EKG  INTERPRETATION:      Personally Reviewed      Rhythm:  Normal sinus rhythm      Rate: 65 bpm      Axis: Normal axis      Intervals:  Normal IN interval 192 ms     QRS Complex:  Normal      ST Segment:  Normal ST-T segments      QT Interval: QTc 438 ms      Compared with Prior: Similar to prior from December 2022       Medical Decision Making:   ED Course as of 11/28/23 2344 Tue Nov 28, 2023 2133 Vitals Reviewed: Afebrile. Hypertensive. Not tachycardic nor tachypneic. No hypoxia.   [KA]   2204 I personally viewed labs.  CBC without abnormalities.  Troponin normal.  BNP normal.  CMP shows mild hyponatremia otherwise no abnormalities.  I personally reviewed chest x-ray and see no evidence of pneumonia nor pleural effusion.  I personally reviewed CT imaging and my limited interpretation is no acute bleed nor mass.  Radiology concurs, no acute intracranial pathology. [KA]   2205 Chart review: I personally reviewed echocardiogram from July 2023 which shows normal LVEF with EF 60 to 65%.  Normal RVSP. [KA]   2207 Patient is a 53-year-old female who presents to the ED for evaluation of hypertension.  She also notes headache that has been ongoing for months.  Does not see neurology.  Cleared by ENT and ophthalmology.  I discussed lab and imaging results with her.  Provided reassurance.  Will give 30 mg IV Toradol, 10 mg p.o. Reglan and 25 mg p.o. Vistaril for symptom management and reassess.  I recommended that she speak with neurology and if headaches continue discussed obtaining MRI.  Also advised that Tylenol is safe to take with her daily meds and when she gets headache she should try taking Tylenol. [KA]   2343 Patient reports pain has improved to 3/10.  Will send home with prescription for Zofran Vistaril.  Have advised that she should follow-up with primary care and neurology.  Will place neurology consult.  Return to ER for any new or worsening symptoms.  She is agreeable. [KA]      ED Course User Index  [KA]  Erica Uriostegui PA-C         Diagnoses as of 11/28/23 2344   Hypertension, unspecified type   Chronic nonintractable headache, unspecified headache type   Tinnitus of both ears       Procedures     Chronic Medical Conditions Significantly Affecting Care:      Escalation of Care: Appropriate for outpatient management   Counseling: Spoke with the patient and discussed today´s findings, in addition to providing specific details for the plan of care and expected course.  Patient was given the opportunity to ask questions.    Discussed return precautions and importance of follow-up.  Advised to follow-up with PCP and neurology.  Advised to return to the ED for changing or worsening symptoms, new symptoms, complaint specific precautions, and precautions listed on the discharge paperwork.  Educated on the common potential side effects of medications prescribed.    I advised the patient that the emergency evaluation and treatment provided today doesn't end their need for medical care. It is very important that they follow-up with their primary care provider or other specialist as instructed.    The plan of care was mutually agreed upon with the patient. The patient and/or family were given the opportunity to ask questions. All questions asked today in the ED were answered to the best of my ability with today's information.    I specifically advised the patient to return to the ED for changing or worsening symptoms, worrisome new symptoms, or for any complaint specific precautions listed on the discharge paperwork.    This patient was cared for in the setting of nationwide stress on resources and staffing.    This report was transcribed using voice recognition software.  Every effort was made to ensure accuracy, however, inadvertently computerized transcription errors may be present.       Erica Uriostegui PA-C  11/28/23 2201

## 2024-01-07 DIAGNOSIS — E78.2 MIXED HYPERLIPIDEMIA: ICD-10-CM

## 2024-01-07 RX ORDER — ATORVASTATIN CALCIUM 80 MG/1
80 TABLET, FILM COATED ORAL DAILY
Qty: 30 TABLET | Refills: 0 | Status: SHIPPED | OUTPATIENT
Start: 2024-01-07 | End: 2024-02-05 | Stop reason: SDUPTHER

## 2024-01-08 DIAGNOSIS — I25.10 CORONARY ARTERY DISEASE INVOLVING NATIVE HEART WITHOUT ANGINA PECTORIS, UNSPECIFIED VESSEL OR LESION TYPE: Primary | ICD-10-CM

## 2024-01-10 RX ORDER — ASPIRIN 81 MG/1
81 TABLET ORAL DAILY
Qty: 30 TABLET | Refills: 2 | Status: SHIPPED | OUTPATIENT
Start: 2024-01-10 | End: 2024-04-05

## 2024-01-26 ENCOUNTER — OFFICE VISIT (OUTPATIENT)
Dept: NEUROLOGY | Facility: HOSPITAL | Age: 54
End: 2024-01-26
Payer: MEDICARE

## 2024-01-26 VITALS
TEMPERATURE: 97.5 F | WEIGHT: 219 LBS | BODY MASS INDEX: 38.8 KG/M2 | SYSTOLIC BLOOD PRESSURE: 164 MMHG | HEART RATE: 62 BPM | HEIGHT: 63 IN | RESPIRATION RATE: 18 BRPM | DIASTOLIC BLOOD PRESSURE: 85 MMHG

## 2024-01-26 DIAGNOSIS — R51.9 ACUTE INTRACTABLE HEADACHE, UNSPECIFIED HEADACHE TYPE: ICD-10-CM

## 2024-01-26 DIAGNOSIS — I1A.0 RESISTANT HYPERTENSION: Primary | ICD-10-CM

## 2024-01-26 PROBLEM — M32.9 SYSTEMIC LUPUS ERYTHEMATOSUS (MULTI): Status: RESOLVED | Noted: 2023-07-26 | Resolved: 2024-01-26

## 2024-01-26 PROCEDURE — 3079F DIAST BP 80-89 MM HG: CPT | Performed by: PSYCHIATRY & NEUROLOGY

## 2024-01-26 PROCEDURE — 99205 OFFICE O/P NEW HI 60 MIN: CPT | Performed by: PSYCHIATRY & NEUROLOGY

## 2024-01-26 PROCEDURE — 99215 OFFICE O/P EST HI 40 MIN: CPT | Performed by: PSYCHIATRY & NEUROLOGY

## 2024-01-26 PROCEDURE — 3077F SYST BP >= 140 MM HG: CPT | Performed by: PSYCHIATRY & NEUROLOGY

## 2024-01-26 PROCEDURE — 4010F ACE/ARB THERAPY RXD/TAKEN: CPT | Performed by: PSYCHIATRY & NEUROLOGY

## 2024-01-26 PROCEDURE — 1036F TOBACCO NON-USER: CPT | Performed by: PSYCHIATRY & NEUROLOGY

## 2024-01-26 RX ORDER — CYPROHEPTADINE HYDROCHLORIDE 4 MG/1
2 TABLET ORAL 2 TIMES DAILY
Qty: 30 TABLET | Refills: 2 | Status: SHIPPED | OUTPATIENT
Start: 2024-01-26 | End: 2024-02-19

## 2024-01-26 RX ORDER — ALPRAZOLAM 0.5 MG/1
0.5 TABLET ORAL SEE ADMIN INSTRUCTIONS
Qty: 3 TABLET | Refills: 0 | Status: SHIPPED | OUTPATIENT
Start: 2024-01-26 | End: 2024-04-22 | Stop reason: ALTCHOICE

## 2024-01-26 ASSESSMENT — PAIN SCALES - GENERAL: PAINLEVEL: 0-NO PAIN

## 2024-01-26 NOTE — LETTER
January 31, 2024     Obdulio Grimm DO  [address]  [address]  [facility] [address]    Patient: Lory Taylor   YOB: 1970   Date of Visit: 1/26/2024       Dear Dr. Obdulio Grimm DO:    Thank you for referring Lory Taylor to me for evaluation. Below are my notes for this consultation.  If you have questions, please do not hesitate to call me. I look forward to following your patient along with you.       Sincerely,     Priti Lr MD      CC: No Recipients  ______________________________________________________________________________________    Subjective     Lory Taylor is a 53 y.o. year old female with PMHx of T2DM, HTN, HLD, CAD (MI 2018, s/p stent, on ASA), HFpEF (EF 60 to 65% 07/2023), KATEY (compliant with CPAP), who presents to Neurology clinic to establish care for headaches.     Of note, pt was seen in the ED in 11/2023 for hypertensive urgency with SBP 190s and associated headache, described as pressure behind her eyes. They referred her to follow-up with Neurology for the headaches.      HPI  History obtained from interview with patient. Pt reports intermittent headaches since 2022. The headache is described as constant pressure in selvin-orbital/frontal area bilaterally. Pt has noted the headaches usually come on when she has ongoing stressors, particularly with work. The headaches were occurring daily at it's worst but has improved recently to ~4 per week. They used to be ~8/10 in severity at it's worst but improved to ~6/10 now. She notes that the headaches got better after she loosened her CPAP mask as she feels that it was too tight for her. Pt usually lays down when a headache comes on and hasn't tried any medications in the past, including tylenol. The headaches usually improve on it's own, in a couple of hours. Denies associated photophobia, phonophobia and nausea. Of note, she has checked her blood pressure at home when she gets the headaches and they can be variable,  "usually in 110/120s systolic but it can sometimes be as high as 190s. She denies headaches first thing in the morning, can start within 1-2 hours. Headaches improve with lying down. She has also noted intermittent congestion and sensation of feeling \"nasal\". Denies history of sinus infections or allergies. She also endorses intermittent tinnitus in her left ear, unsure if it is unrelated to the headaches.     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    Social History:  - Lives at home with   - Independent with ADL/IADLs, ambulates without assistance  - Tobacco: denies (has tried cigarettes in the past but never smoked for prolonged period)  - Alcohol: denies, occasional drinking prior   - Illicit drugs: denies         Patient Active Problem List   Diagnosis   • Type 2 diabetes mellitus without complication (CMS/HCC)   • Angina pectoris (CMS/HCC)   • Coronary artery disease without angina pectoris   • Hypomagnesemia   • Mixed hyperlipidemia   • Hypertensive heart disease without congestive heart failure   • Intermittent claudication of both lower extremities due to atherosclerosis (CMS/HCC)   • Morbid obesity (CMS/HCC)   • Occlusion and stenosis of bilateral carotid arteries   • Systemic lupus erythematosus (CMS/HCC)   • ACC/AHA stage C systolic heart failure (CMS/HCC)   • CAD S/P percutaneous coronary angioplasty   • Combined form of age-related cataract, both eyes   • HTN (hypertension)   • Imbalance   • Snoring   • Tinnitus     No past medical history on file.  No past surgical history on file.  Social History     Tobacco Use   • Smoking status: Never     Passive exposure: Never "   • Smokeless tobacco: Never   Substance Use Topics   • Alcohol use: Not Currently     family history is not on file.    Current Outpatient Medications:   •  aspirin 81 mg EC tablet, Take 1 tablet (81 mg) by mouth once daily., Disp: 30 tablet, Rfl: 2  •  atorvastatin (Lipitor) 80 mg tablet, Take 1 tablet (80 mg) by mouth once daily. -----MUST HAVE LABS AND APPT, Disp: 30 tablet, Rfl: 0  •  cetirizine (ZyrTEC) 10 mg tablet, Take 1 tablet (10 mg) by mouth once daily., Disp: , Rfl:   •  hydrALAZINE (Apresoline) 25 mg tablet, Take 1 tablet (25 mg) by mouth 2 times a day., Disp: 180 tablet, Rfl: 2  •  hydrOXYzine HCL (Atarax) 25 mg tablet, Take 1 tablet (25 mg) by mouth every 8 hours if needed for anxiety, allergies or itching., Disp: 12 tablet, Rfl: 0  •  lisinopril 30 mg tablet, Take 1 tablet (30 mg) by mouth once daily., Disp: 90 tablet, Rfl: 1  •  metoprolol succinate XL (Toprol-XL) 100 mg 24 hr tablet, Take 1 tablet (100 mg) by mouth once daily., Disp: 90 tablet, Rfl: 2  •  nitroglycerin (Nitrostat) 0.4 mg SL tablet, Place 1 tablet (0.4 mg) under the tongue every 5 minutes if needed for chest pain., Disp: , Rfl:   •  ondansetron ODT (Zofran-ODT) 4 mg disintegrating tablet, Take 1 tablet (4 mg) by mouth every 8 hours if needed for nausea or vomiting., Disp: 10 tablet, Rfl: 0  •  pantoprazole (ProtoNix) 40 mg EC tablet, Take 1 tablet (40 mg) by mouth once daily., Disp: , Rfl:   Allergies   Allergen Reactions   • Penicillins Unknown     Told as a baby had a reaction       Objective   Neurological Exam  Physical Exam  GENERAL APPEARANCE:  No distress, alert and cooperative.     MENTAL STATE:  Orientation was normal to time, place and person. Language testing was normal for comprehension, repetition, expression, and naming. Able to follow complex commands across midline.     OPHTHALMOSCOPIC: The fundi were visualized with no obvious evidence of optic disk edema. There was evidence of AV nicking on the left.     CRANIAL  NERVES:  Cranial nerves were normal.      CN 2- Visual fields full to confrontation.      CN 3, 4, 6-  Pupils round, 4 mm in diameter, equally reactive to light. No ptosis. EOMs normal alignment, full range of movement, no nystagmus     CN 5- Facial sensation intact bilaterally.      CN 7- Normal and symmetric facial strength. Nasolabial folds symmetric.     CN 8- Hearing intact to finger rub, whisper.      CN 9- Palate elevates symmetrically.      CN 11- Normal strength of shoulder shrug and neck turning      CN 12- Tongue midline, with normal bulk and strength; no fasciculations.     MOTOR:  Motor exam was normal. Muscle bulk and tone were normal in both upper and lower extremities. Muscle strength was 5/5 in distal and proximal muscles in both upper and lower extremities. No fasciculations, tremor or other abnormal movements were present.     REFLEXES:  Right/ Left:  Biceps 2/2, brachioradialis 2/2, triceps 2/2, patellar 2/2, ankle 2/2  Babinski: toes downgoing to plantar stimulation. No clonus, frontal release signs or other pathologic reflexes present.     SENSORY: Sensory exam was intact to light touch in both UE and LE.     COORDINATION: In UE- finger-nose-finger was intact and in LE- heel-to-shin was intact without dysmetria or overshoot.      GAIT: Station was stable with a normal base. Gait was stable with a normal arm swing and speed. No ataxia, shuffling, steppage or waddling was noted.     Assessment/Plan   Lory Taylor is a 53 y.o. year old female with PMHx of T2DM, HTN, HLD, CAD (MI 2018, s/p stent, on ASA), HFpEF (EF 60 to 65% 07/2023), KATEY (compliant with CPAP), who presents to Neurology clinic to establish care for headaches. Of note, pt has been seen multiple times in the ED for hypertensive urgency with associated headaches. Pt describes bifrontal/selvin-orbital constant pressure, occurring ~4 times per week, lasting a few hours and improving with laying down. No s/s of high-pressure headache  including no evidence of papilledema on funduscopic exam. Pt also c/o associated sinus pressure and nasal congestion, along with prior CT sinus showing deviated septum. These headaches are likely multifactorial in etiology with components of tension-type headache, sinusitis, KATEY and poorly-controlled HTN. Will recommend PO magnesium supplements daily as well as Cyproheptadine prn at onset of headache. Given new-onset headaches in pt age>50, will also order MRI/MRV to evaluate for IIH or venous sinus narrowing.     Recommendations:  - Magnesium supplements daily  - Cyproheptadine 2mg prn at onset of headache symptoms   - MRI/MRV to evaluate for IIH or venous sinus narrowing    - RTC in 6 weeks.       This patient was seen, discussed and examined with the attending, Dr. Lr, who agrees with the management plan.    Edward Owens MD  PGY-2, Neurology

## 2024-01-26 NOTE — LETTER
January 31, 2024     Erica Uriostegui PA-C  [address] [facility] [address]    Patient: Lory Taylor   YOB: 1970   Date of Visit: 1/26/2024       Dear Dr. Erica Uriostegui PA-C:    Thank you for referring Lory Taylor to me for evaluation. Below are my notes for this consultation.  If you have questions, please do not hesitate to call me. I look forward to following your patient along with you.       Sincerely,     Priti Lr MD      CC: Obdulio Grimm, DO  ______________________________________________________________________________________    Subjective     Lory Taylor is a 53 y.o. year old female with PMHx of T2DM, HTN, HLD, CAD (MI 2018, s/p stent, on ASA), HFpEF (EF 60 to 65% 07/2023), KATEY (compliant with CPAP), who presents to Neurology clinic to establish care for headaches.     Of note, pt was seen in the ED in 11/2023 for hypertensive urgency with SBP 190s and associated headache, described as pressure behind her eyes. They referred her to follow-up with Neurology for the headaches.      HPI  History obtained from interview with patient. Pt reports intermittent headaches since 2022. The headache is described as constant pressure in selvin-orbital/frontal area bilaterally. Pt has noted the headaches usually come on when she has ongoing stressors, particularly with work. The headaches were occurring daily at it's worst but has improved recently to ~4 per week. They used to be ~8/10 in severity at it's worst but improved to ~6/10 now. She notes that the headaches got better after she loosened her CPAP mask as she feels that it was too tight for her. Pt usually lays down when a headache comes on and hasn't tried any medications in the past, including tylenol. The headaches usually improve on it's own, in a couple of hours. Denies associated photophobia, phonophobia and nausea. Of note, she has checked her blood pressure at home when she gets the headaches and they can be  "variable, usually in 110/120s systolic but it can sometimes be as high as 190s. She denies headaches first thing in the morning, can start within 1-2 hours. Headaches improve with lying down. She has also noted intermittent congestion and sensation of feeling \"nasal\". Denies history of sinus infections or allergies. She also endorses intermittent tinnitus in her left ear, unsure if it is unrelated to the headaches.     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    Social History:  - Lives at home with   - Independent with ADL/IADLs, ambulates without assistance  - Tobacco: denies (has tried cigarettes in the past but never smoked for prolonged period)  - Alcohol: denies, occasional drinking prior   - Illicit drugs: denies         Patient Active Problem List   Diagnosis   • Type 2 diabetes mellitus without complication (CMS/[facility])   • Angina pectoris (CMS/[facility])   • Coronary artery disease without angina pectoris   • Hypomagnesemia   • Mixed hyperlipidemia   • Hypertensive heart disease without congestive heart failure   • Intermittent claudication of both lower extremities due to atherosclerosis (CMS/HCC)   • Morbid obesity (CMS/HCC)   • Occlusion and stenosis of bilateral carotid arteries   • Systemic lupus erythematosus (CMS/HCC)   • ACC/AHA stage C systolic heart failure (CMS/HCC)   • CAD S/P percutaneous coronary angioplasty   • Combined form of age-related cataract, both eyes   • HTN (hypertension)   • Imbalance   • Snoring   • Tinnitus     No past medical history on file.  No past surgical history on file.  Social History     Tobacco Use   • Smoking status: Never     Passive " exposure: Never   • Smokeless tobacco: Never   Substance Use Topics   • Alcohol use: Not Currently     family history is not on file.    Current Outpatient Medications:   •  aspirin 81 mg EC tablet, Take 1 tablet (81 mg) by mouth once daily., Disp: 30 tablet, Rfl: 2  •  atorvastatin (Lipitor) 80 mg tablet, Take 1 tablet (80 mg) by mouth once daily. -----MUST HAVE LABS AND APPT, Disp: 30 tablet, Rfl: 0  •  cetirizine (ZyrTEC) 10 mg tablet, Take 1 tablet (10 mg) by mouth once daily., Disp: , Rfl:   •  hydrALAZINE (Apresoline) 25 mg tablet, Take 1 tablet (25 mg) by mouth 2 times a day., Disp: 180 tablet, Rfl: 2  •  hydrOXYzine HCL (Atarax) 25 mg tablet, Take 1 tablet (25 mg) by mouth every 8 hours if needed for anxiety, allergies or itching., Disp: 12 tablet, Rfl: 0  •  lisinopril 30 mg tablet, Take 1 tablet (30 mg) by mouth once daily., Disp: 90 tablet, Rfl: 1  •  metoprolol succinate XL (Toprol-XL) 100 mg 24 hr tablet, Take 1 tablet (100 mg) by mouth once daily., Disp: 90 tablet, Rfl: 2  •  nitroglycerin (Nitrostat) 0.4 mg SL tablet, Place 1 tablet (0.4 mg) under the tongue every 5 minutes if needed for chest pain., Disp: , Rfl:   •  ondansetron ODT (Zofran-ODT) 4 mg disintegrating tablet, Take 1 tablet (4 mg) by mouth every 8 hours if needed for nausea or vomiting., Disp: 10 tablet, Rfl: 0  •  pantoprazole (ProtoNix) 40 mg EC tablet, Take 1 tablet (40 mg) by mouth once daily., Disp: , Rfl:   Allergies   Allergen Reactions   • Penicillins Unknown     Told as a baby had a reaction       Objective   Neurological Exam  Physical Exam  GENERAL APPEARANCE:  No distress, alert and cooperative.     MENTAL STATE:  Orientation was normal to time, place and person. Language testing was normal for comprehension, repetition, expression, and naming. Able to follow complex commands across midline.     OPHTHALMOSCOPIC: The fundi were visualized with no obvious evidence of optic disk edema. There was evidence of AV nicking on the  left.     CRANIAL NERVES:  Cranial nerves were normal.      CN 2- Visual fields full to confrontation.      CN 3, 4, 6-  Pupils round, 4 mm in diameter, equally reactive to light. No ptosis. EOMs normal alignment, full range of movement, no nystagmus     CN 5- Facial sensation intact bilaterally.      CN 7- Normal and symmetric facial strength. Nasolabial folds symmetric.     CN 8- Hearing intact to finger rub, whisper.      CN 9- Palate elevates symmetrically.      CN 11- Normal strength of shoulder shrug and neck turning      CN 12- Tongue midline, with normal bulk and strength; no fasciculations.     MOTOR:  Motor exam was normal. Muscle bulk and tone were normal in both upper and lower extremities. Muscle strength was 5/5 in distal and proximal muscles in both upper and lower extremities. No fasciculations, tremor or other abnormal movements were present.     REFLEXES:  Right/ Left:  Biceps 2/2, brachioradialis 2/2, triceps 2/2, patellar 2/2, ankle 2/2  Babinski: toes downgoing to plantar stimulation. No clonus, frontal release signs or other pathologic reflexes present.     SENSORY: Sensory exam was intact to light touch in both UE and LE.     COORDINATION: In UE- finger-nose-finger was intact and in LE- heel-to-shin was intact without dysmetria or overshoot.      GAIT: Station was stable with a normal base. Gait was stable with a normal arm swing and speed. No ataxia, shuffling, steppage or waddling was noted.     Assessment/Plan   Lory Taylor is a 53 y.o. year old female with PMHx of T2DM, HTN, HLD, CAD (MI 2018, s/p stent, on ASA), HFpEF (EF 60 to 65% 07/2023), KATEY (compliant with CPAP), who presents to Neurology clinic to establish care for headaches. Of note, pt has been seen multiple times in the ED for hypertensive urgency with associated headaches. Pt describes bifrontal/selvin-orbital constant pressure, occurring ~4 times per week, lasting a few hours and improving with laying down. No s/s of  high-pressure headache including no evidence of papilledema on funduscopic exam. Pt also c/o associated sinus pressure and nasal congestion, along with prior CT sinus showing deviated septum. These headaches are likely multifactorial in etiology with components of tension-type headache, sinusitis, KATEY and poorly-controlled HTN. Will recommend PO magnesium supplements daily as well as Cyproheptadine prn at onset of headache. Given new-onset headaches in pt age>50, will also order MRI/MRV to evaluate for IIH or venous sinus narrowing.     Recommendations:  - Magnesium supplements daily  - Cyproheptadine 2mg prn at onset of headache symptoms   - MRI/MRV to evaluate for IIH or venous sinus narrowing    - RTC in 6 weeks.       This patient was seen, discussed and examined with the attending, Dr. Lr, who agrees with the management plan.    Edward Owens MD  PGY-2, Neurology

## 2024-01-26 NOTE — PATIENT INSTRUCTIONS
Start magnesium daily  Cyproheptadine as needed for headache  Mri/mrv for IIH  Follow up in 6 weeks

## 2024-01-26 NOTE — LETTER
January 26, 2024     Erica Uriostegui PA-C  [address] [facility] [address]    Patient: Lory Taylor   YOB: 1970   Date of Visit: 1/26/2024       Dear Dr. Erica Uriostegui PA-C:    Thank you for referring Lory Taylor to me for evaluation. Below are my notes for this consultation.  If you have questions, please do not hesitate to call me. I look forward to following your patient along with you.       Sincerely,     Priti Lr MD      CC: No Recipients  ______________________________________________________________________________________    Subjective     Lory Taylor is a 53 y.o. year old female with PMHx of T2DM, HTN, HLD, CAD (MI 2018, s/p stent, on ASA), HFpEF (EF 60 to 65% 07/2023), KATEY (compliant with CPAP), who presents to Neurology clinic to establish care for headaches.     Of note, pt was seen in the ED in 11/2023 for hypertensive urgency with SBP 190s and associated headache, described as pressure behind her eyes. They referred her to follow-up with Neurology for the headaches.      HPI  History obtained from interview with patient. Pt reports intermittent headaches since 2022. The headache is described as constant pressure in selvin-orbital/frontal area bilaterally. Pt has noted the headaches usually come on when she has ongoing stressors, particularly with work. The headaches were occurring daily at it's worst but has improved recently to ~4 per week. They used to be ~8/10 in severity at it's worst but improved to ~6/10 now. She notes that the headaches got better after she loosened her CPAP mask as she feels that it was too tight for her. Pt usually lays down when a headache comes on and hasn't tried any medications in the past, including tylenol. The headaches usually improve on it's own, in a couple of hours. Denies associated photophobia, phonophobia and nausea. Of note, she has checked her blood pressure at home when she gets the headaches and they can be variable,  "usually in 110/120s systolic but it can sometimes be as high as 190s. She denies headaches first thing in the morning, can start within 1-2 hours. Headaches improve with lying down. She has also noted intermittent congestion and sensation of feeling \"nasal\". Denies history of sinus infections or allergies. She also endorses intermittent tinnitus, unsure if it is unrelated to the headaches.     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    Social History:  - Lives at home with   - Independent with ADL/IADLs, ambulates without assistance  - Tobacco: denies (has tried cigarettes in the past but never smoked for prolonged period)  - Alcohol: denies, occasional drinking prior   - Illicit drugs: denies         Patient Active Problem List   Diagnosis   • Type 2 diabetes mellitus without complication (CMS/[facility])   • Angina pectoris (CMS/[facility])   • Coronary artery disease without angina pectoris   • Hypomagnesemia   • Mixed hyperlipidemia   • Hypertensive heart disease without congestive heart failure   • Intermittent claudication of both lower extremities due to atherosclerosis (CMS/[facility])   • Morbid obesity (CMS/[facility])   • Occlusion and stenosis of bilateral carotid arteries   • Systemic lupus erythematosus (CMS/[facility])   • ACC/AHA stage C systolic heart failure (CMS/[facility])   • CAD S/P percutaneous coronary angioplasty   • Combined form of age-related cataract, both eyes   • HTN (hypertension)   • Imbalance   • Snoring   • Tinnitus     No past medical history on file.  No past surgical history on file.  Social History     Tobacco Use   • Smoking status: Never     Passive exposure: Never   • Smokeless " tobacco: Never   Substance Use Topics   • Alcohol use: Not Currently     family history is not on file.    Current Outpatient Medications:   •  aspirin 81 mg EC tablet, Take 1 tablet (81 mg) by mouth once daily., Disp: 30 tablet, Rfl: 2  •  atorvastatin (Lipitor) 80 mg tablet, Take 1 tablet (80 mg) by mouth once daily. -----MUST HAVE LABS AND APPT, Disp: 30 tablet, Rfl: 0  •  cetirizine (ZyrTEC) 10 mg tablet, Take 1 tablet (10 mg) by mouth once daily., Disp: , Rfl:   •  hydrALAZINE (Apresoline) 25 mg tablet, Take 1 tablet (25 mg) by mouth 2 times a day., Disp: 180 tablet, Rfl: 2  •  hydrOXYzine HCL (Atarax) 25 mg tablet, Take 1 tablet (25 mg) by mouth every 8 hours if needed for anxiety, allergies or itching., Disp: 12 tablet, Rfl: 0  •  lisinopril 30 mg tablet, Take 1 tablet (30 mg) by mouth once daily., Disp: 90 tablet, Rfl: 1  •  metoprolol succinate XL (Toprol-XL) 100 mg 24 hr tablet, Take 1 tablet (100 mg) by mouth once daily., Disp: 90 tablet, Rfl: 2  •  nitroglycerin (Nitrostat) 0.4 mg SL tablet, Place 1 tablet (0.4 mg) under the tongue every 5 minutes if needed for chest pain., Disp: , Rfl:   •  ondansetron ODT (Zofran-ODT) 4 mg disintegrating tablet, Take 1 tablet (4 mg) by mouth every 8 hours if needed for nausea or vomiting., Disp: 10 tablet, Rfl: 0  •  pantoprazole (ProtoNix) 40 mg EC tablet, Take 1 tablet (40 mg) by mouth once daily., Disp: , Rfl:   Allergies   Allergen Reactions   • Penicillins Unknown     Told as a baby had a reaction       Objective   Neurological Exam  Physical Exam  GENERAL APPEARANCE:  No distress, alert and cooperative.     MENTAL STATE:  Orientation was normal to time, place and person. Recent and remote memory was intact.  Attention span and concentration were normal. Language testing was normal for comprehension, repetition, expression, and naming. Calculation was intact. The patient could correctly interpret a picture, and copy a diagram. General fund of knowledge was intact.  Mini-mental status examination was performed with no errors.     OPHTHALMOSCOPIC: The ophthalmoscopic exam normal. The fundi were well visualized with normal disc margins, clear vessels and vascular pulsations. No disc edema. The cup/disk ratio was not enlarged. No hemorrhages or exudates were present in the posterior segments that were visualized.     CRANIAL NERVES:  Cranial nerves were normal.      CN 2- Visual Acuity  OD: 20/20 (corrected)   OS: 20/20 (corrected); visual fields full to confrontation.      CN 3, 4, 6-  Pupils round, 4 mm in diameter, equally reactive to light. No ptosis. EOMs normal alignment, full range of movement, no nystagmus     CN 5- Facial sensation intact bilaterally. Normal corneal reflexes.      CN 7- Normal and symmetric facial strength. Nasolabial folds symmetric.     CN 8- Hearing intact to finger rub, whisper.      CN 9- Palate elevates symmetrically. Normal gag reflex.      CN 11- Normal strength of shoulder shrug and neck turning      CN 12- Tongue midline, with normal bulk and strength; no fasciculations.     MOTOR:  Motor exam was normal. Muscle bulk and tone were normal in both upper and lower extremities. Muscle strength was 5/5 in distal and proximal muscles in both upper and lower extremities. No fasciculations, tremor or other abnormal movements were present.     REFLEXES:  Right/ Left:  Biceps 2/2, brachioradialis 2/2, triceps 2/2, patellar 2/2, ankle 2/2  Babinski: toes downgoing to plantar stimulation. No clonus, frontal release signs or other pathologic reflexes present.     SENSORY: Sensory exam was intact to light touch, sharp/dull, vibration and position sense in both UE and LE.     COORDINATION: IVAN were intact in upper and lower extremities.  In UE- finger-nose-finger was intact and in LE- heel-to-shin was intact without dysmetria or overshoot.      GAIT: Station was stable with a normal base and negative Romberg sign. Gait was stable with a normal arm swing and  speed. No ataxia, shuffling, steppage or waddling was noted. Tandem gait was intact. Postural reflexes were normal.     Assessment/Plan   {Assess/PlanSmartLinks:42246}

## 2024-01-26 NOTE — PROGRESS NOTES
"Subjective     Lory Taylor is a 53 y.o. year old female with PMHx of T2DM, HTN, HLD, CAD (MI 2018, s/p stent, on ASA), HFpEF (EF 60 to 65% 07/2023), KATEY (compliant with CPAP), who presents to Neurology clinic to establish care for headaches.     Of note, pt was seen in the ED in 11/2023 for hypertensive urgency with SBP 190s and associated headache, described as pressure behind her eyes. They referred her to follow-up with Neurology for the headaches.      HPI  History obtained from interview with patient. Pt reports intermittent headaches since 2022. The headache is described as constant pressure in selvin-orbital/frontal area bilaterally. Pt has noted the headaches usually come on when she has ongoing stressors, particularly with work. The headaches were occurring daily at it's worst but has improved recently to ~4 per week. They used to be ~8/10 in severity at it's worst but improved to ~6/10 now. She notes that the headaches got better after she loosened her CPAP mask as she feels that it was too tight for her. Pt usually lays down when a headache comes on and hasn't tried any medications in the past, including tylenol. The headaches usually improve on it's own, in a couple of hours. Denies associated photophobia, phonophobia and nausea. Of note, she has checked her blood pressure at home when she gets the headaches and they can be variable, usually in 110/120s systolic but it can sometimes be as high as 190s. She denies headaches first thing in the morning, can start within 1-2 hours. Headaches improve with lying down. She has also noted intermittent congestion and sensation of feeling \"nasal\". Denies history of sinus infections or allergies. She also endorses intermittent tinnitus in her left ear, unsure if it is unrelated to the headaches.     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    Social History:  - Lives at home with   - Independent with ADL/IADLs, ambulates without assistance  - Tobacco: denies (has tried cigarettes in the past but never smoked for prolonged period)  - Alcohol: denies, occasional drinking prior   - Illicit drugs: denies         Patient Active Problem List   Diagnosis    Type 2 diabetes mellitus without complication (CMS/[facility])    Angina pectoris (CMS/[facility])    Coronary artery disease without angina pectoris    Hypomagnesemia    Mixed hyperlipidemia    Hypertensive heart disease without congestive heart failure    Intermittent claudication of both lower extremities due to atherosclerosis (CMS/[facility])    Morbid obesity (CMS/[facility])    Occlusion and stenosis of bilateral carotid arteries    Systemic lupus erythematosus (CMS/[facility])    ACC/AHA stage C systolic heart failure (CMS/[facility])    CAD S/P percutaneous coronary angioplasty    Combined form of age-related cataract, both eyes    HTN (hypertension)    Imbalance    Snoring    Tinnitus     No past medical history on file.  No past surgical history on file.  Social History     Tobacco Use    Smoking status: Never     Passive exposure: Never    Smokeless tobacco: Never   Substance Use Topics    Alcohol use: Not Currently     family history is not on file.    Current Outpatient Medications:     aspirin 81 mg EC tablet, Take 1 tablet (81 mg) by mouth once daily., Disp: 30 tablet, Rfl: 2    atorvastatin (Lipitor) 80 mg tablet, Take 1 tablet (80 mg) by mouth once daily. -----MUST HAVE LABS AND APPT, Disp: 30 tablet, Rfl: 0    cetirizine (ZyrTEC) 10 mg tablet, Take 1 tablet (10 mg) by mouth once daily., Disp: , Rfl:     hydrALAZINE (Apresoline) 25 mg tablet, Take 1 tablet (25 mg) by mouth 2 times a day., Disp: 180 tablet, Rfl: 2     hydrOXYzine HCL (Atarax) 25 mg tablet, Take 1 tablet (25 mg) by mouth every 8 hours if needed for anxiety, allergies or itching., Disp: 12 tablet, Rfl: 0    lisinopril 30 mg tablet, Take 1 tablet (30 mg) by mouth once daily., Disp: 90 tablet, Rfl: 1    metoprolol succinate XL (Toprol-XL) 100 mg 24 hr tablet, Take 1 tablet (100 mg) by mouth once daily., Disp: 90 tablet, Rfl: 2    nitroglycerin (Nitrostat) 0.4 mg SL tablet, Place 1 tablet (0.4 mg) under the tongue every 5 minutes if needed for chest pain., Disp: , Rfl:     ondansetron ODT (Zofran-ODT) 4 mg disintegrating tablet, Take 1 tablet (4 mg) by mouth every 8 hours if needed for nausea or vomiting., Disp: 10 tablet, Rfl: 0    pantoprazole (ProtoNix) 40 mg EC tablet, Take 1 tablet (40 mg) by mouth once daily., Disp: , Rfl:   Allergies   Allergen Reactions    Penicillins Unknown     Told as a baby had a reaction       Objective   Neurological Exam  Physical Exam  GENERAL APPEARANCE:  No distress, alert and cooperative.     MENTAL STATE:  Orientation was normal to time, place and person. Language testing was normal for comprehension, repetition, expression, and naming. Able to follow complex commands across midline.     OPHTHALMOSCOPIC: The fundi were visualized with no obvious evidence of optic disk edema. There was evidence of AV nicking on the left.     CRANIAL NERVES:  Cranial nerves were normal.      CN 2- Visual fields full to confrontation.      CN 3, 4, 6-  Pupils round, 4 mm in diameter, equally reactive to light. No ptosis. EOMs normal alignment, full range of movement, no nystagmus     CN 5- Facial sensation intact bilaterally.      CN 7- Normal and symmetric facial strength. Nasolabial folds symmetric.     CN 8- Hearing intact to finger rub, whisper.      CN 9- Palate elevates symmetrically.      CN 11- Normal strength of shoulder shrug and neck turning      CN 12- Tongue midline, with normal bulk and strength; no fasciculations.     MOTOR:  Motor exam  was normal. Muscle bulk and tone were normal in both upper and lower extremities. Muscle strength was 5/5 in distal and proximal muscles in both upper and lower extremities. No fasciculations, tremor or other abnormal movements were present.     REFLEXES:  Right/ Left:  Biceps 2/2, brachioradialis 2/2, triceps 2/2, patellar 2/2, ankle 2/2  Babinski: toes downgoing to plantar stimulation. No clonus, frontal release signs or other pathologic reflexes present.     SENSORY: Sensory exam was intact to light touch in both UE and LE.     COORDINATION: In UE- finger-nose-finger was intact and in LE- heel-to-shin was intact without dysmetria or overshoot.      GAIT: Station was stable with a normal base. Gait was stable with a normal arm swing and speed. No ataxia, shuffling, steppage or waddling was noted.     Assessment/Plan   Lory Taylor is a 53 y.o. year old female with PMHx of T2DM, HTN, HLD, CAD (MI 2018, s/p stent, on ASA), HFpEF (EF 60 to 65% 07/2023), KATEY (compliant with CPAP), who presents to Neurology clinic to establish care for headaches. Of note, pt has been seen multiple times in the ED for hypertensive urgency with associated headaches. Pt describes bifrontal/selvin-orbital constant pressure, occurring ~4 times per week, lasting a few hours and improving with laying down. No s/s of high-pressure headache including no evidence of papilledema on funduscopic exam. Pt also c/o associated sinus pressure and nasal congestion, along with prior CT sinus showing deviated septum. These headaches are likely multifactorial in etiology with components of tension-type headache, sinusitis, KATEY and poorly-controlled HTN. Will recommend PO magnesium supplements daily as well as Cyproheptadine prn at onset of headache. Given new-onset headaches in pt age>50, will also order MRI/MRV to evaluate for IIH or venous sinus narrowing.     Recommendations:  - Magnesium supplements daily  - Cyproheptadine 2mg prn at onset of  headache symptoms   - MRI/MRV to evaluate for IIH or venous sinus narrowing    - RTC in 6 weeks.       This patient was seen, discussed and examined with the attending, Dr. Lr, who agrees with the management plan.    Edward Owens MD  PGY-2, Neurology

## 2024-01-29 ENCOUNTER — OFFICE VISIT (OUTPATIENT)
Dept: CARDIOLOGY | Facility: CLINIC | Age: 54
End: 2024-01-29
Payer: MEDICARE

## 2024-01-29 VITALS
DIASTOLIC BLOOD PRESSURE: 70 MMHG | HEIGHT: 63 IN | WEIGHT: 219 LBS | HEART RATE: 71 BPM | SYSTOLIC BLOOD PRESSURE: 140 MMHG | BODY MASS INDEX: 38.8 KG/M2 | OXYGEN SATURATION: 99 %

## 2024-01-29 DIAGNOSIS — Z98.61 CAD S/P PERCUTANEOUS CORONARY ANGIOPLASTY: ICD-10-CM

## 2024-01-29 DIAGNOSIS — R42 DIZZINESS: Primary | ICD-10-CM

## 2024-01-29 DIAGNOSIS — I25.10 CAD S/P PERCUTANEOUS CORONARY ANGIOPLASTY: ICD-10-CM

## 2024-01-29 DIAGNOSIS — I50.20 ACC/AHA STAGE C SYSTOLIC HEART FAILURE (MULTI): ICD-10-CM

## 2024-01-29 DIAGNOSIS — I25.10 CORONARY ARTERY DISEASE INVOLVING NATIVE CORONARY ARTERY OF NATIVE HEART WITHOUT ANGINA PECTORIS: ICD-10-CM

## 2024-01-29 DIAGNOSIS — I10 PRIMARY HYPERTENSION: ICD-10-CM

## 2024-01-29 DIAGNOSIS — I70.213 INTERMITTENT CLAUDICATION OF BOTH LOWER EXTREMITIES DUE TO ATHEROSCLEROSIS (CMS-HCC): ICD-10-CM

## 2024-01-29 DIAGNOSIS — H93.A9 PULSATILE TINNITUS: ICD-10-CM

## 2024-01-29 DIAGNOSIS — E78.2 MIXED HYPERLIPIDEMIA: ICD-10-CM

## 2024-01-29 DIAGNOSIS — I11.9 HYPERTENSIVE HEART DISEASE WITHOUT CONGESTIVE HEART FAILURE: ICD-10-CM

## 2024-01-29 DIAGNOSIS — I65.23 OCCLUSION AND STENOSIS OF BILATERAL CAROTID ARTERIES: ICD-10-CM

## 2024-01-29 DIAGNOSIS — I20.9 ANGINA PECTORIS (CMS-HCC): ICD-10-CM

## 2024-01-29 DIAGNOSIS — G47.33 OSA (OBSTRUCTIVE SLEEP APNEA): ICD-10-CM

## 2024-01-29 PROCEDURE — 99215 OFFICE O/P EST HI 40 MIN: CPT | Performed by: STUDENT IN AN ORGANIZED HEALTH CARE EDUCATION/TRAINING PROGRAM

## 2024-01-29 PROCEDURE — 3077F SYST BP >= 140 MM HG: CPT | Performed by: STUDENT IN AN ORGANIZED HEALTH CARE EDUCATION/TRAINING PROGRAM

## 2024-01-29 PROCEDURE — 3078F DIAST BP <80 MM HG: CPT | Performed by: STUDENT IN AN ORGANIZED HEALTH CARE EDUCATION/TRAINING PROGRAM

## 2024-01-29 PROCEDURE — 4010F ACE/ARB THERAPY RXD/TAKEN: CPT | Performed by: STUDENT IN AN ORGANIZED HEALTH CARE EDUCATION/TRAINING PROGRAM

## 2024-01-29 PROCEDURE — 1036F TOBACCO NON-USER: CPT | Performed by: STUDENT IN AN ORGANIZED HEALTH CARE EDUCATION/TRAINING PROGRAM

## 2024-01-29 NOTE — PATIENT INSTRUCTIONS
Will arrange for carotid Dopplers.    We did discuss that if you have significant elevation of blood pressure after rechecking it, you can take an additional dose of hydralazine.    Please continue current cardiac medications including aspirin 81 mg, atorvastatin 80 mg daily, hydralazine 25 mg twice daily, lisinopril 30 mg daily, metoprolol succinate 100 mg daily, sublingual nitroglycerin as needed.    We will also arrange for follow-up with sleep medicine.    Please followup with me in Cardiology clinic within the next 6 months.  Please return to clinic sooner or seek emergent care if your symptoms reoccur or worsen.

## 2024-01-29 NOTE — PROGRESS NOTES
Follow-up     HPI:    Lory Taylor is a 53 y.o. female with pertinent history of sleep apnea, tinnitus, type 2 diabetes, obstructive coronary artery disease status post PCI to LAD June 2018 with history of recovered ischemic cardiomyopathy, history acute on chronic diastolic heart failure, preserved ejection fraction with impaired relaxation, normalization of prior regional wall motion abnormalities, mild right ventricular and right atrial dilation, mild aortic regurgitation echo performed 7/20/2023 presents to cardiology clinic for follow up.     She is doing relatively well but has been having issues with her sinuses causing discomfort.  She does note some occasional dizziness pulsatile tinnitus.  Dyspnea on exertion is stable.  She has minimal episodes of chest pressure.  She does note that her blood pressure occasionally spikes but is normally well-controlled.  No exacerbating or relieving factors.   Pt denies orthopnea, and paroxysmal nocturnal dyspnea.  Pt denies worsening lower extremity edema.  Pt denies palpitations or syncope.  No recent falls.  No fever or chills.  No cough.  No change in bowel or bladder habits.  No sick contacts.  No recent travel.    12 point review of systems including (Constitutional, Eyes, ENMT, Respiratory, Cardiac, Gastrointestinal, Neurological, Psychiatric, and Hematologic) was performed and is otherwise negative.    Past medical history:  As above.    Medications were reviewed.    Allergies were reviewed.    Family history:  No sudden cardiac death or premature coronary artery disease.     Social history reviewed:   reports that she has never smoked. She has never been exposed to tobacco smoke. She has never used smokeless tobacco. She reports that she does not currently use alcohol. She reports that she does not use drugs.     Allergies reviewed: Penicillins     Medications reviewed:   Current Outpatient Medications   Medication Instructions    ALPRAZolam (XANAX) 0.5 mg,  oral, See admin instructions, Take one tablet when leaving house ( must have ) and one when you arrive to the MRI then 1 as needed during exam    aspirin 81 mg, oral, Daily    atorvastatin (LIPITOR) 80 mg, oral, Daily, -----MUST HAVE LABS AND APPT    cetirizine (ZYRTEC) 10 mg, oral, Daily RT    cyproheptadine (PERIACTIN) 2 mg, oral, 2 times daily    hydrALAZINE (APRESOLINE) 25 mg, oral, 2 times daily    hydrOXYzine HCL (ATARAX) 25 mg, oral, Every 8 hours PRN    lisinopril 30 mg, oral, Daily    metoprolol succinate XL (TOPROL-XL) 100 mg, oral, Daily    nitroglycerin (NITROSTAT) 0.4 mg, sublingual, Every 5 min PRN    ondansetron ODT (ZOFRAN-ODT) 4 mg, oral, Every 8 hours PRN        Vitals reviewed: Visit Vitals  /70   Pulse 71       Physical Exam:   General:  Patient is awake, alert, and oriented.  Patient is in no acute distress.  HEENT:  Pupils equal and reactive.  Normocephalic.  Moist mucosa.    Neck:  No thyromegaly.  Normal Jugular Venous Pressure.  Cardiovascular:  Regular rate and rhythm.  Normal S1 and S2.  1/6 MATTHEW.  Pulmonary:  Clear to auscultation bilaterally.  Abdomen:  Soft. Non-tender.   Non-distended.  Positive bowel sounds.  Lower Extremities:  2+ pedal pulses. No LE edema.  Neurologic:  Cranial nerves intact.  No focal deficit.   Skin: Skin warm and dry, normal skin turgor.   Psychiatric: Normal affect.    Last Labs:  CBC -      Lab Results   Component Value Date    WBC 7.2 11/28/2023    HGB 14.4 11/28/2023    HCT 45.8 11/28/2023     11/28/2023        CMP-  Lab Results   Component Value Date    GLUCOSE 120 (H) 11/28/2023     (L) 11/28/2023    K 3.8 11/28/2023     11/28/2023    CO2 27 11/28/2023    ANIONGAP 11 11/28/2023    BUN 18 11/28/2023    CREATININE 0.79 11/28/2023    EGFR 90 11/28/2023    CALCIUM 8.9 11/28/2023    PHOS 2.6 06/28/2018    PROT 7.2 11/28/2023    ALBUMIN 4.2 11/28/2023    AST 22 11/28/2023    ALT 21 11/28/2023    ALKPHOS 50 11/28/2023    BILITOT 0.5  11/28/2023        LIPIDS-  Lab Results   Component Value Date    CHOL 128 03/09/2021    TRIG 34 03/09/2021    HDL 51.6 03/09/2021    CHHDL 2.5 03/09/2021    VLDL 7 03/09/2021        OTHERS-  Lab Results   Component Value Date    HGBA1C 6.1 (A) 07/27/2023    BNP 30 11/28/2023        I personally reviewed the patient's recent vitals, labs, medications, orders, EKGs, pertinent cardiac imaging/ echocardiography and ischemic evaluations including stress testing/ cardiac catheterization.    Assessment and Plan:    Lory Taylor is a 53 y.o. female with pertinent history of sleep apnea, tinnitus, type 2 diabetes, obstructive coronary artery disease status post PCI to LAD June 2018 with history of recovered ischemic cardiomyopathy, history acute on chronic diastolic heart failure, preserved ejection fraction with impaired relaxation, normalization of prior regional wall motion abnormalities, mild right ventricular and right atrial dilation, mild aortic regurgitation echo performed 7/20/2023 presents to cardiology clinic for follow up.  She is doing relatively well but has been having issues with her sinuses causing discomfort.  She does note some occasional dizziness pulsatile tinnitus.  Dyspnea on exertion is stable.  She has minimal episodes of chest pressure.  She does note that her blood pressure occasionally spikes but is normally well-controlled.     Will arrange for carotid Dopplers.    We did discuss that if you have significant elevation of blood pressure after rechecking it, you can take an additional dose of hydralazine.    Please continue current cardiac medications including aspirin 81 mg, atorvastatin 80 mg daily, hydralazine 25 mg twice daily, lisinopril 30 mg daily, metoprolol succinate 100 mg daily, sublingual nitroglycerin as needed.    We will also arrange for follow-up with sleep medicine.    Please followup with me in Cardiology clinic within the next 6 months.  Please return to clinic sooner or seek  emergent care if your symptoms reoccur or worsen.    Thank you for allowing me to participate in their care.  Please feel free to call me with any further questions or concerns.        Jonah Flores MD, [facility], [facility]  Division of Cardiovascular Medicine  Medical Director, [facility] [place]  Clinical , [employer]  [email or web address]   Office:  [phone]

## 2024-02-04 DIAGNOSIS — E78.2 MIXED HYPERLIPIDEMIA: ICD-10-CM

## 2024-02-04 RX ORDER — ATORVASTATIN CALCIUM 80 MG/1
80 TABLET, FILM COATED ORAL DAILY
Qty: 30 TABLET | Refills: 0 | OUTPATIENT
Start: 2024-02-04

## 2024-02-05 RX ORDER — ATORVASTATIN CALCIUM 80 MG/1
80 TABLET, FILM COATED ORAL DAILY
Qty: 30 TABLET | Refills: 0 | Status: SHIPPED | OUTPATIENT
Start: 2024-02-05 | End: 2024-03-18

## 2024-02-13 ENCOUNTER — OFFICE VISIT (OUTPATIENT)
Dept: PRIMARY CARE | Facility: CLINIC | Age: 54
End: 2024-02-13
Payer: MEDICARE

## 2024-02-13 ENCOUNTER — OFFICE VISIT (OUTPATIENT)
Dept: SLEEP MEDICINE | Facility: HOSPITAL | Age: 54
End: 2024-02-13
Payer: MEDICARE

## 2024-02-13 ENCOUNTER — LAB (OUTPATIENT)
Dept: LAB | Facility: LAB | Age: 54
End: 2024-02-13
Payer: MEDICARE

## 2024-02-13 VITALS
DIASTOLIC BLOOD PRESSURE: 69 MMHG | OXYGEN SATURATION: 100 % | SYSTOLIC BLOOD PRESSURE: 126 MMHG | HEART RATE: 69 BPM | BODY MASS INDEX: 37.55 KG/M2 | WEIGHT: 212 LBS

## 2024-02-13 VITALS
SYSTOLIC BLOOD PRESSURE: 129 MMHG | TEMPERATURE: 98.2 F | WEIGHT: 215 LBS | OXYGEN SATURATION: 98 % | HEART RATE: 68 BPM | BODY MASS INDEX: 38.09 KG/M2 | DIASTOLIC BLOOD PRESSURE: 79 MMHG

## 2024-02-13 DIAGNOSIS — E78.2 MIXED HYPERLIPIDEMIA: Primary | ICD-10-CM

## 2024-02-13 DIAGNOSIS — I50.20 ACC/AHA STAGE C SYSTOLIC HEART FAILURE (MULTI): ICD-10-CM

## 2024-02-13 DIAGNOSIS — I10 PRIMARY HYPERTENSION: ICD-10-CM

## 2024-02-13 DIAGNOSIS — E11.9 TYPE 2 DIABETES MELLITUS WITHOUT COMPLICATION, WITHOUT LONG-TERM CURRENT USE OF INSULIN (MULTI): ICD-10-CM

## 2024-02-13 DIAGNOSIS — G47.33 OSA (OBSTRUCTIVE SLEEP APNEA): Primary | ICD-10-CM

## 2024-02-13 DIAGNOSIS — R06.83 SNORING: ICD-10-CM

## 2024-02-13 DIAGNOSIS — J34.89 SINUS DRAINAGE: ICD-10-CM

## 2024-02-13 DIAGNOSIS — E66.01 MORBID OBESITY (MULTI): ICD-10-CM

## 2024-02-13 LAB
CREAT UR-MCNC: 23.8 MG/DL (ref 20–320)
EST. AVERAGE GLUCOSE BLD GHB EST-MCNC: 137 MG/DL
HBA1C MFR BLD: 6.4 %
MICROALBUMIN UR-MCNC: <7 MG/L
MICROALBUMIN/CREAT UR: NORMAL MG/G{CREAT}

## 2024-02-13 PROCEDURE — 3062F POS MACROALBUMINURIA REV: CPT | Performed by: INTERNAL MEDICINE

## 2024-02-13 PROCEDURE — 3044F HG A1C LEVEL LT 7.0%: CPT | Performed by: INTERNAL MEDICINE

## 2024-02-13 PROCEDURE — 82043 UR ALBUMIN QUANTITATIVE: CPT

## 2024-02-13 PROCEDURE — 36415 COLL VENOUS BLD VENIPUNCTURE: CPT

## 2024-02-13 PROCEDURE — 3078F DIAST BP <80 MM HG: CPT | Performed by: INTERNAL MEDICINE

## 2024-02-13 PROCEDURE — 3074F SYST BP LT 130 MM HG: CPT | Performed by: INTERNAL MEDICINE

## 2024-02-13 PROCEDURE — 4010F ACE/ARB THERAPY RXD/TAKEN: CPT | Performed by: INTERNAL MEDICINE

## 2024-02-13 PROCEDURE — 99214 OFFICE O/P EST MOD 30 MIN: CPT | Performed by: INTERNAL MEDICINE

## 2024-02-13 PROCEDURE — 1036F TOBACCO NON-USER: CPT | Performed by: INTERNAL MEDICINE

## 2024-02-13 PROCEDURE — 82570 ASSAY OF URINE CREATININE: CPT

## 2024-02-13 PROCEDURE — 99204 OFFICE O/P NEW MOD 45 MIN: CPT | Performed by: INTERNAL MEDICINE

## 2024-02-13 PROCEDURE — 83036 HEMOGLOBIN GLYCOSYLATED A1C: CPT

## 2024-02-13 PROCEDURE — 99214 OFFICE O/P EST MOD 30 MIN: CPT | Mod: GC | Performed by: INTERNAL MEDICINE

## 2024-02-13 SDOH — ECONOMIC STABILITY: FOOD INSECURITY: WITHIN THE PAST 12 MONTHS, YOU WORRIED THAT YOUR FOOD WOULD RUN OUT BEFORE YOU GOT MONEY TO BUY MORE.: NEVER TRUE

## 2024-02-13 SDOH — ECONOMIC STABILITY: FOOD INSECURITY: WITHIN THE PAST 12 MONTHS, THE FOOD YOU BOUGHT JUST DIDN'T LAST AND YOU DIDN'T HAVE MONEY TO GET MORE.: NEVER TRUE

## 2024-02-13 ASSESSMENT — PATIENT HEALTH QUESTIONNAIRE - PHQ9
1. LITTLE INTEREST OR PLEASURE IN DOING THINGS: NOT AT ALL
2. FEELING DOWN, DEPRESSED OR HOPELESS: NOT AT ALL
SUM OF ALL RESPONSES TO PHQ9 QUESTIONS 1 & 2: 0

## 2024-02-13 ASSESSMENT — LIFESTYLE VARIABLES: HOW OFTEN DO YOU HAVE A DRINK CONTAINING ALCOHOL: NEVER

## 2024-02-13 ASSESSMENT — PAIN SCALES - GENERAL: PAINLEVEL: 0-NO PAIN

## 2024-02-13 NOTE — ASSESSMENT & PLAN NOTE
Good recovery post stenting.  Continue following with cardiology.  Currently asymptomatic.  Currently euvolemic.

## 2024-02-13 NOTE — ASSESSMENT & PLAN NOTE
Controlled off medications.  Does not check her sugars at home.  Will check A1c and adjust medications pending results.  Check urine albumin.  Advised annual eye exams.

## 2024-02-13 NOTE — PATIENT INSTRUCTIONS
[facility] Sleep Medicine  [facility]  [address]  [facility] [address]  [phone]       NAME: Lory Taylor   DATE: 2/13/2024     Your Sleep Provider Today: Ru Landaverde MD PhD  Your Primary Care Physician: Obdulio Grimm, DO   Your Referring Provider: Jonah Flores MD    DIAGNOSIS:   1. Dizziness  Referral to Adult Sleep Medicine      2. Pulsatile tinnitus  Referral to Adult Sleep Medicine      3. KATEY (obstructive sleep apnea)  Referral to Adult Sleep Medicine      4. Coronary artery disease involving native coronary artery of native heart without angina pectoris  Referral to Adult Sleep Medicine      5. Angina pectoris (CMS/HCC)  Referral to Adult Sleep Medicine      6. Mixed hyperlipidemia  Referral to Adult Sleep Medicine      7. Hypertensive heart disease without congestive heart failure  Referral to Adult Sleep Medicine      8. Intermittent claudication of both lower extremities due to atherosclerosis (CMS/HCC)  Referral to Adult Sleep Medicine      9. Occlusion and stenosis of bilateral carotid arteries  Referral to Adult Sleep Medicine      10. ACC/AHA stage C systolic heart failure (CMS/HCC)  Referral to Adult Sleep Medicine      11. CAD S/P percutaneous coronary angioplasty  Referral to Adult Sleep Medicine      12. Primary hypertension  Referral to Adult Sleep Medicine          Thank you for coming to the Sleep Medicine Clinic today! Your sleep medicine provider today was: Ru Landaverde MD PhD Below is a summary of your treatment plan, other important information, and our contact numbers:      TREATMENT PLAN     Please schedule your sleep study  In the meantime you can get Sandeep Med sinus rinses at the drugstore       Referrals:  We are referring you to: ENT- please make an appointment with them      IMPORTANT INFORMATION     Call 911 for medical emergencies.  Our offices are generally open from Monday-Friday, 9 am - 5  pm.  If you need to get in touch with me, you may either call me and my team(number is below) or you can use [employer].  If a referral for a test, for CPAP, or for another specialist was made, and you have not heard about scheduling this within a week, please call scheduling at [phone]).  If you are unable to make your appointment for clinic or an overnight study, kindly call the office at least 48 hours in advance to cancel and reschedule.  If you are on CPAP, please bring your device's card or the device to each clinic appointment.   There are no supporting services by either the sleep doctors or their staff on weekends and Holidays, or after 5 PM on weekdays.   If you have been asked to come to a sleep study, make sure you bring toiletries, a comfy pillow, and any nighttime medications that you may regularly take. Also be sure to eat dinner before you arrive. We generally do not provide meals.      PRESCRIPTIONS     We require 7 days advanced notice for prescription refills. If we do not receive the request in this time, we cannot guarantee that your medication will be refilled in time.      IMPORTANT PHONE NUMBERS     Sleep Medicine Clinic Fax: [phone]  Appointments (for Pediatric Sleep Clinic): [phone] - option 1  Appointments (for Adult Sleep Clinic): [phone] - option 2  Appointments (For Sleep Studies): [phone] - option 3  Behavioral Sleep Medicine: [phone]  Sleep Surgery: [phone]  ENT (Otolaryngology): [phone]  Headache Clinic (Neurology): [phone]  Neurology: [phone]  Psychiatry: [phone]  Pulmonary Function Testing (PFT) Center: [phone]  Pulmonary Medicine: [phone]  [employer] (DME): [phone]  [employer] (DME): [phone]  [facility] ([facility]): [phone]      OUR ADULT SLEEP MEDICINE TEAM   Please do not hesitate to call the office or sleep nurse with any questions between  appointments:    Adult Sleep Nurses (Ashanti Smiley, RN and Grace Buenrostro, RN):  For clinical questions and refilling prescriptions: [phone]  Email sleep diaries and other documents at: [email or web address]    Adult Sleep Medicine Secretaries:  Katherine Sanchez (For Marcus/[place]/Krise/[place]/Yeh/[place]):   P: [phone]  F: [phone]  Sweetie Zaidi (For Landaverde/Guggenbiller): P: [phone]  Fax: [phone]  Isabella Rosa (For Jurcevic/Blank): P: [phone]  F: [phone]  Rocio Escobedo (For [place]): P: [phone]  F: [phone]  Josefina Monzon (For Lisa/Yefri/Zakhary): P: [phone]  F: [phone]  Crystal Earl (For Mendoza/Gibson): P: [phone]  F: [phone]     Adult Sleep Medicine Advanced Practice Providers:  Tanvir Brennan ([place], [place])  Kiersten Asif ([facility])  Kathryn Feng CNP ([place], [place], [place])  Gali Bernabe CNP ([place], [place], [place])  Radha Savage ([place], [place], [place])  Olga Gibson CNP ([facility])        OUR SLEEP TESTING LOCATIONS     Our team will contact you to schedule your sleep study, however, you can contact us as follow:  Main Phone Line (scheduling only): [phone]), option 3  Adult and Pediatric Locations  [facility] (6 years and older): [facility] - 4th floor ([address] [place] [address]) After hours line: [phone]  [facility] (Main campus: All ages): [facility], 6th floor. After hours line: [phone]  [facility] (5 years and older; younger considered on case-by-case basis): [address]; [address], [address]. Scheduling  After hours line: [phone]   [place] (6 years and older): [address]; [address]; [address]  [employer] (6 years and older): [address] [facility], [address]  After hours line: [phone]   Daniel (13 years and older) in [place]: [address],  "2nd floor  After hours line: [phone]   Anca (13 year and older): [address]  After hours line: [phone]     Adult Only Locations:   Teresa (18 years and older): [address] [facility], 2nd floor   Tami (18 years and older): [address] [facility]; 4th floor  After hours line: [phone]  [facility] (18 years and older) at [place]: [id number] [place] [address]  After hours line: [phone]          CONTACTING YOUR SLEEP MEDICINE PROVIDER     Send a message directly to your provider through \"My Chart\", which is the email service through your  Records Account: [email or web address]   Call [phone] and leave a message. One of the administrative assistants will forward the message to your sleep medicine provider through \"My Chart\" and/or email.         Your sleep medicine provider for this visit was: Ru Landaverde MD PhD    ---------------------------    Nasal rinses or irrigation may help are your sinus symptoms by washing away mucus, allergy causing particles and irritants such as pollens, dust particles, pollutants and bacteria, which may help decrease inflammation of the sinus lining. This may help to fight sinus infections and reduce allergies symptoms.     Assemble ingredients:  Salt-kosher or pickling or myrtle salt  Cooled boiled Water-Tap water is acceptable to use for most people.  If you have a well or an immune disorder use cooled boiled purified water.  Room temperature water or slightly warmer will make it more comfortable.  Baking soda-not baking powder  Or Get Sandeep-Med Sinus Rinses     Avoid using if you have any of the following:  Ear infection  Recent ear surgery  Completely block nasal or ear passage  Swallowing disorder    Cleaning of rinse container:  After each use, wash out with warm soapy water and rinse thoroughly, air dry on a paper towel.  Weekly clean with a solution of 2 TBS white distilled vinegar and 1-cup water.    Some " Tips:  Breathe through your mouth or hold breath while rinsing  Stop rinsing if you need to sneeze or cough  Don't talk while rinsing  Rinse at least 1-2 hours before bedtime    Instructions for Rinsing:  Wash hands   Fill up bottle with 8 oz. room temperature or slightly warmer water  Add 1 teaspoon salt and º teaspoon baking soda or add Sinus Rinse packet  Put cap on bottle and place finger over hole and shake until mixture is dissolved  Stand over the sink and bend forward, and tilt head forward  Put the cap tightly up to the nasal passage and squeeze bottle gently using º of the solution   When finished, blow nose with both nostrils open and repeat to use half of the solution then blow nose again  Repeat the same process on the other nasal passage.    Packets of prepared salt/baking soda mix are available at most pharmacies or on the web [email or web address]

## 2024-02-13 NOTE — PROGRESS NOTES
Subjective   Patient ID: Lory Taylor is a 53 y.o. female who presents for Follow-up.    HPI     Patient is a 52-year-old female with past medical history of diabetes mellitus type 2 hypertension GERD systolic congestive heart failure status post stenting with reduced ejection fraction of 45 to 50% who presents for follow-up    Patient has diabetes mellitus type 2.  A1c well-controlled at 6.1.  Not currently on medications.  She sticks with a low carbohydrate diet.  She is trying to lose weight and jumps on trampolines quite frequently for exercise.  No increased thirst or increased urination.  No numbness or tingling of the extremities    Systolic congestive heart failure.  Patient is status post MI status post stenting.  She is currently on high-dose statin as well as ACE inhibitor and beta-blocker.  She has established with a cardiologist.  No shortness of breath on exertion no significant weight gain and no lower extremity swelling    She she reports ongoing pressure in the sinuses.  She is following with neurology headache team and has a plan for MRI of the head upcoming    She has a reported history of lupus in her chart and its unclear when or how she was diagnosed with lupus.  She has not had any lab work to suggest lupus.    Obesity with BMI of greater than 35.          Review of Systems  Constitutional: No fever or chills  Cardiovascular: no chest pain, no palpitations and no syncope.   Respiratory: no cough, no shortness of breath during exertion and no shortness of breath at rest.   Gastrointestinal: no abdominal pain, no nausea and no vomiting.  Neuro: No Headache, no dizziness    Objective   /69   Pulse 69   Wt 96.2 kg (212 lb)   SpO2 100%   BMI 37.55 kg/m²     Physical Exam  Constitutional: Alert and in no acute distress. Well developed, well nourished  Head and Face: Head and face: Normal.    Cardiovascular: Heart rate and rhythm were normal, normal S1 and S2. No peripheral edema.    Pulmonary: No respiratory distress. Clear bilateral breath sounds.  Musculoskeletal: Gait and station: Normal. Muscle strength/tone: Normal.   Skin: Normal skin color and pigmentation, normal skin turgor, and no rash.    Psychiatric: Judgment and insight: Intact. Mood and affect: Normal.        Lab Results   Component Value Date    WBC 7.2 11/28/2023    HGB 14.4 11/28/2023    HCT 45.8 11/28/2023     11/28/2023    CHOL 128 03/09/2021    TRIG 34 03/09/2021    HDL 51.6 03/09/2021    ALT 21 11/28/2023    AST 22 11/28/2023     (L) 11/28/2023    K 3.8 11/28/2023     11/28/2023    CREATININE 0.79 11/28/2023    BUN 18 11/28/2023    CO2 27 11/28/2023    TSH 2.13 07/27/2023    INR 0.9 06/26/2018    HGBA1C 6.1 (A) 07/27/2023       XR chest 2 views  Narrative: Interpreted By:  Evette Montes,   STUDY:  XR CHEST 2 VIEWS;  11/28/2023 9:19 pm      INDICATION:  Signs/Symptoms:cp.      COMPARISON:  Chest x-ray 12/06/2022.      ACCESSION NUMBER(S):  [id number]      ORDERING CLINICIAN:  IESHA CRESPO      TECHNIQUE:  Frontal and lateral views of the chest were obtained.      FINDINGS:  The cardiomediastinal silhouette is within normal limits.      No focal consolidation, pleural effusion or pneumothorax.      Multilevel degenerative changes are noted at the spine with  levoscoliosis of the thoracic spine.      Impression: 1. No radiographic evidence of acute cardiopulmonary process.              MACRO:  None.      Signed by: Evette Montes 11/28/2023 10:02 PM  Dictation workstation:   [id number]  CT head wo IV contrast  Narrative: Interpreted By:  Evette Montes,   STUDY:  CT HEAD WO IV CONTRAST;  11/28/2023 9:11 pm      INDICATION:  Signs/Symptoms:Hypertension headache.      COMPARISON:  CT sinus 07/14/2023.      ACCESSION NUMBER(S):  [id number]      ORDERING CLINICIAN:  YOLANDA SALAZAR      TECHNIQUE:  Noncontrast CT axial images were obtained through the brain.  Coronal  and sagittal reformats  were performed.      FINDINGS:  The ventricles, sulci and basal cisterns are within normal limits.  The grey-white matter differentiation is intact. No acute territorial  infarct.  There is no mass effect or midline shift. There is no  extraaxial fluid collection.  There is no intracranial hemorrhage.  No depressed calvarial fracture. No air-fluid levels at the  visualized paranasal sinuses. The mastoid air cells are clear.      Impression: 1.  No acute intracranial hemorrhage or acute territorial infarct.                  MACRO:  None.      Signed by: Evette Montes 11/28/2023 10:00 PM  Dictation workstation:   [id number]            Assessment/Plan   Problem List Items Addressed This Visit       Type 2 diabetes mellitus without complication (CMS/HCC)     Controlled off medications.  Does not check her sugars at home.  Will check A1c and adjust medications pending results.  Check urine albumin.  Advised annual eye exams.         Relevant Orders    Hemoglobin A1C    Albumin , Urine Random    Follow Up In Advanced Primary Care - PCP - Established    Mixed hyperlipidemia - Primary     Continue statin advised Mediterranean diet         Morbid obesity (CMS/HCC)     Encouraged weight reduction and regular exercise as you are doing         ACC/AHA stage C systolic heart failure (CMS/HCC)     Good recovery post stenting.  Continue following with cardiology.  Currently asymptomatic.  Currently euvolemic.         Snoring     Patient has been referred to sleep medicine for evaluation.

## 2024-02-13 NOTE — PROGRESS NOTES
Patient: Lory Taylor    [id number]  : 1970 -- AGE 53 y.o.    Provider: Marissa Carter MD     Location [facility]   Service Date: 2024          [facility] Sleep Medicine Clinic  New Visit Note    HISTORY OF PRESENT ILLNESS     The patient's referring provider is: Obdulio Grimm DO    HISTORY OF PRESENT ILLNESS   Lory Taylor is a 53 y.o. female with a past medical history significant for T2DM, HTN, HLD, CAD (MI 2018, s/p stent, on ASA), HFpEF (EF 60 to 65% 2023), KATEY who presents to a [facility] Sleep Medicine Clinic for a comprehensive sleep medicine evaluation.     Patient has a history of KATEY and is on CPAP. Not sure when she was diagnosed/started CPAP. She did have a home sleep study and thinks it was soon after the COVID pandemic started.  She was compliant and doing well with it up until several months to a year ago.  She has noticed that her CPAP has been causing her sinus issues, headaches and nasal congestion upon waking. She has an MRI coming up to evaluate for IIH vs venous sinus narrowing. She did have a sinus CT with ENT in the past which showed a deviated septum.     She is currently using CPAP intermittently. When she uses it she does noticed that her residual AHI is <1. She has a FFM which is somewhat comfortable but makes her headaches worse. She is unsure if snoring is better without it.   Mask type: FFM   DME: [facility]    Sleep History:  Patient typically goes to bed around 11 on the weekdays and 11 on the weekends. It takes few minutes to fall asleep.   Patient wakes up 1-2 times at night. Awakenings are due to nocturia and last few minutes.  Patient wakes up feeling unrefreshed. Typically takes naps daily    Patient reports snoring,  witnessed apneic episodes by bed partner, morning headaches, dry mouth and nasal congestion. She denies waking up choking/gasping.    Preferred sleeping position: SLEEP POSITION: supine, prone, and  sidelying    Patient denies history of RLS, cataplexy, nightmares, hypnagogic or hypnopompic hallucinations or parasomnias. They do not act out their dreams.     Past Sleep History  Patient has the following sleep-related diagnoses: obstructive sleep apnea.     ESS: 16  NICOLE: 8      REVIEW OF SYSTEMS     REVIEW OF SYSTEMS: as above      ALLERGIES AND MEDICATIONS     ALLERGIES  Allergies   Allergen Reactions    Penicillins Unknown and Rash     Told as a baby had a reaction       MEDICATIONS  Current Outpatient Medications   Medication Sig Dispense Refill    ALPRAZolam (Xanax) 0.5 mg tablet Take 1 tablet (0.5 mg) by mouth see administration instructions for 3 doses. Take one tablet when leaving house ( must have ) and one when you arrive to the MRI then 1 as needed during exam 3 tablet 0    aspirin 81 mg EC tablet Take 1 tablet (81 mg) by mouth once daily. 30 tablet 2    atorvastatin (Lipitor) 80 mg tablet Take 1 tablet (80 mg) by mouth once daily. 30 tablet 0    cetirizine (ZyrTEC) 10 mg tablet Take 1 tablet (10 mg) by mouth once daily.      cyproheptadine (Periactin) 4 mg tablet Take 0.5 tablets (2 mg) by mouth 2 times a day. 30 tablet 2    hydrALAZINE (Apresoline) 25 mg tablet Take 1 tablet (25 mg) by mouth 2 times a day. 180 tablet 2    hydrOXYzine HCL (Atarax) 25 mg tablet Take 1 tablet (25 mg) by mouth every 8 hours if needed for anxiety, allergies or itching. 12 tablet 0    lisinopril 30 mg tablet Take 1 tablet (30 mg) by mouth once daily. 90 tablet 1    metoprolol succinate XL (Toprol-XL) 100 mg 24 hr tablet Take 1 tablet (100 mg) by mouth once daily. 90 tablet 2    nitroglycerin (Nitrostat) 0.4 mg SL tablet Place 1 tablet (0.4 mg) under the tongue every 5 minutes if needed for chest pain.       No current facility-administered medications for this visit.         PAST HISTORY     PAST MEDICAL HISTORY  She  has no past medical history on file.    PAST SURGICAL HISTORY:  No past surgical history on  file.    FAMILY HISTORY  No family history on file.    She does have a family history of sleep disorder.    SOCIAL HISTORY  She  reports that she has never smoked. She has never been exposed to tobacco smoke. She has never used smokeless tobacco. She reports that she does not drink alcohol and does not use drugs. She currently lives with her .       Caffeine consumption: Yes, rarely (occasional)  Alcohol consumption: No  Smoking: No  Marijuana: No      PHYSICAL EXAM     VITAL SIGNS: /79 (BP Location: Left arm, Patient Position: Sitting)   Pulse 68   Temp 36.8 °C (98.2 °F)   Wt 97.5 kg (215 lb)   SpO2 98% Comment: RA  BMI 38.09 kg/m²      CURRENT WEIGHT:   Vitals:    02/13/24 1303   Weight: 97.5 kg (215 lb)     Body mass index is 38.09 kg/m².  PREVIOUS WEIGHTS:  Wt Readings from Last 3 Encounters:   02/13/24 97.5 kg (215 lb)   02/13/24 96.2 kg (212 lb)   01/29/24 99.3 kg (219 lb)     BMI 37.55  Physical Exam:  General: Alert, oriented, conversant.  HEENT: Lateral facial profile with mild retrognathia, nasal septum deviated to the left, narrow nasal passages, turbinates boggy, oropharynx is Mallampati class 3, tongue large with scalloping, jaw occlusion class 1, dentition good.  Heart: RRR, no murmur  Lungs: CTAB, no wheezing  Extremities: no peripheral edema  Neurologic: Language is normal and fluent, face symmetric, tongue protrusion midline, stance and gait normal.   Psychiatric: Affect appropriate, mood appropriate.    RESULTS/DATA     Bicarbonate (mmol/L)   Date Value   11/28/2023 27   12/06/2022 25   09/09/2022 27   08/20/2022 25         ASSESSMENT/PLAN     Ms. Taylor is a 53 y.o. female and She was referred to the [facility] Medicine Clinic for evaluation of KATEY on CPAP. Patient describes sinus issues, headaches and congestion with CPAP on. This started in the past year. She has seen ENT but it has been over a year. She is not doing any medications currently to help with  congestion.     #KATEY:  -patient was using her CPAP nightly until about 1 year ago due to ongoing sinus issues/headaches  -split night study ordered to evaluate sleep apnea as we do not have any data today (Patient did not bring in her CPAP)  -advised patient to bring in her CPAP to her next appointment  -patient was educated on regular sleep/wake times, avoidance of technology in bed and proper sleep hygiene    #Sinus congestion/Headaches:  -likely multifactorial but will assess KATEY with new sleep study to see if this is contributing to her symptoms  -advised patient to use sinus rinses as she is not interested in flonase at this time  -ENT referral made      Thank you for involving Sleep Medicine in this patient's care. It was a pleasure to see Lory Taylor today. We will plan to follow up in 3-4 months, once she has seen ENT and had her sleep study completed.    The patient was seen and discussed with attending / Akhil at the time of the encounter.  Marissa Carter MD  Sleep Medicine Fellow

## 2024-02-16 ENCOUNTER — ANCILLARY PROCEDURE (OUTPATIENT)
Dept: CARDIOLOGY | Facility: CLINIC | Age: 54
End: 2024-02-16
Payer: MEDICARE

## 2024-02-16 DIAGNOSIS — I20.9 ANGINA PECTORIS (CMS-HCC): ICD-10-CM

## 2024-02-16 DIAGNOSIS — R42 DIZZINESS: ICD-10-CM

## 2024-02-16 DIAGNOSIS — I10 PRIMARY HYPERTENSION: ICD-10-CM

## 2024-02-16 DIAGNOSIS — I50.20 ACC/AHA STAGE C SYSTOLIC HEART FAILURE (MULTI): ICD-10-CM

## 2024-02-16 DIAGNOSIS — I25.10 CAD S/P PERCUTANEOUS CORONARY ANGIOPLASTY: ICD-10-CM

## 2024-02-16 DIAGNOSIS — I11.9 HYPERTENSIVE HEART DISEASE WITHOUT CONGESTIVE HEART FAILURE: ICD-10-CM

## 2024-02-16 DIAGNOSIS — Z98.61 CAD S/P PERCUTANEOUS CORONARY ANGIOPLASTY: ICD-10-CM

## 2024-02-16 DIAGNOSIS — G47.33 OSA (OBSTRUCTIVE SLEEP APNEA): ICD-10-CM

## 2024-02-16 DIAGNOSIS — I65.23 OCCLUSION AND STENOSIS OF BILATERAL CAROTID ARTERIES: ICD-10-CM

## 2024-02-16 DIAGNOSIS — E78.2 MIXED HYPERLIPIDEMIA: ICD-10-CM

## 2024-02-16 DIAGNOSIS — I25.10 CORONARY ARTERY DISEASE INVOLVING NATIVE CORONARY ARTERY OF NATIVE HEART WITHOUT ANGINA PECTORIS: ICD-10-CM

## 2024-02-16 DIAGNOSIS — I70.213 INTERMITTENT CLAUDICATION OF BOTH LOWER EXTREMITIES DUE TO ATHEROSCLEROSIS (CMS-HCC): ICD-10-CM

## 2024-02-16 DIAGNOSIS — H93.A9 PULSATILE TINNITUS: ICD-10-CM

## 2024-02-16 PROCEDURE — 93880 EXTRACRANIAL BILAT STUDY: CPT | Performed by: INTERNAL MEDICINE

## 2024-02-16 PROCEDURE — 93880 EXTRACRANIAL BILAT STUDY: CPT

## 2024-02-17 DIAGNOSIS — R51.9 ACUTE INTRACTABLE HEADACHE, UNSPECIFIED HEADACHE TYPE: ICD-10-CM

## 2024-02-19 RX ORDER — LISINOPRIL 30 MG/1
30 TABLET ORAL DAILY
Qty: 90 TABLET | Refills: 3 | Status: SHIPPED | OUTPATIENT
Start: 2024-02-19

## 2024-02-19 RX ORDER — CYPROHEPTADINE HYDROCHLORIDE 4 MG/1
2 TABLET ORAL 2 TIMES DAILY
Qty: 90 TABLET | Refills: 3 | Status: SHIPPED | OUTPATIENT
Start: 2024-02-19 | End: 2024-03-13 | Stop reason: WASHOUT

## 2024-02-20 ENCOUNTER — APPOINTMENT (OUTPATIENT)
Dept: RADIOLOGY | Facility: CLINIC | Age: 54
End: 2024-02-20
Payer: MEDICARE

## 2024-02-20 ENCOUNTER — HOSPITAL ENCOUNTER (OUTPATIENT)
Dept: RADIOLOGY | Facility: CLINIC | Age: 54
End: 2024-02-20
Payer: MEDICARE

## 2024-02-22 ENCOUNTER — OFFICE VISIT (OUTPATIENT)
Dept: NEPHROLOGY | Facility: CLINIC | Age: 54
End: 2024-02-22
Payer: MEDICARE

## 2024-02-22 VITALS
BODY MASS INDEX: 37.92 KG/M2 | DIASTOLIC BLOOD PRESSURE: 79 MMHG | HEART RATE: 70 BPM | WEIGHT: 214 LBS | HEIGHT: 63 IN | SYSTOLIC BLOOD PRESSURE: 121 MMHG

## 2024-02-22 DIAGNOSIS — I1A.0 RESISTANT HYPERTENSION: ICD-10-CM

## 2024-02-22 PROCEDURE — 99204 OFFICE O/P NEW MOD 45 MIN: CPT | Performed by: INTERNAL MEDICINE

## 2024-02-22 PROCEDURE — 4010F ACE/ARB THERAPY RXD/TAKEN: CPT | Performed by: INTERNAL MEDICINE

## 2024-02-22 PROCEDURE — 3074F SYST BP LT 130 MM HG: CPT | Performed by: INTERNAL MEDICINE

## 2024-02-22 PROCEDURE — 3062F POS MACROALBUMINURIA REV: CPT | Performed by: INTERNAL MEDICINE

## 2024-02-22 PROCEDURE — 3044F HG A1C LEVEL LT 7.0%: CPT | Performed by: INTERNAL MEDICINE

## 2024-02-22 PROCEDURE — 1036F TOBACCO NON-USER: CPT | Performed by: INTERNAL MEDICINE

## 2024-02-22 PROCEDURE — 3078F DIAST BP <80 MM HG: CPT | Performed by: INTERNAL MEDICINE

## 2024-02-22 RX ORDER — MAGNESIUM 200 MG
1 TABLET ORAL DAILY
COMMUNITY

## 2024-02-22 RX ORDER — ELECTROLYTES/DEXTROSE
5 SOLUTION, ORAL ORAL DAILY
COMMUNITY

## 2024-02-22 RX ORDER — VIT C/E/ZN/COPPR/LUTEIN/ZEAXAN 250MG-90MG
25 CAPSULE ORAL DAILY
COMMUNITY

## 2024-02-22 RX ORDER — FLUTICASONE PROPIONATE 50 MCG
2 SPRAY, SUSPENSION (ML) NASAL DAILY PRN
COMMUNITY
Start: 2022-11-22

## 2024-02-22 ASSESSMENT — PAIN SCALES - GENERAL: PAINLEVEL: 0-NO PAIN

## 2024-02-22 NOTE — PROGRESS NOTES
"Referred by Dr Lr for htn  H/o DM since 10-14 years.No h/o retinopathy/neuropathy  H/o htn since 10-14 years. Home BP ranges from 110/69 to 198/something. 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    Vitals:    02/22/24 1606 02/22/24 1607   BP: 127/74 121/79   BP Location: Right arm Left arm   Patient Position: Sitting Sitting   BP Cuff Size: Large adult Large adult   Pulse: 72 70   Weight: 97.1 kg (214 lb)    Height: 1.6 m (5' 3\")         No distress  HEENT:  moist, no pallor  No edema elly LE  Breath sounds elly equal, clear  S1 S2 regular, normal, no rub or murmur  Abdomen soft  AAO x3, non focal    Sodium   Date/Time Value Ref Range Status   11/28/2023 09:19  (L) 136 - 145 mmol/L Final   12/06/2022 11:50  136 - 145 mmol/L Final   09/09/2022 08:06  (L) 136 - 145 mmol/L Final   08/20/2022 01:12  (L) 136 - 145 mmol/L Final     Potassium   Date/Time Value Ref Range Status   11/28/2023 09:19 PM 3.8 3.5 - 5.3 mmol/L Final     Comment:     MILD HEMOLYSIS DETECTED. The result may be falsely elevated due to hemolysis or other interferents. Clinical correlation is recommended. Repeat testing may be considered.   12/06/2022 11:50 AM 3.5 3.5 - 5.3 mmol/L Final   09/09/2022 " 08:06 PM 4.1 3.5 - 5.3 mmol/L Final   08/20/2022 01:12 PM 3.7 3.5 - 5.3 mmol/L Final     Chloride   Date/Time Value Ref Range Status   11/28/2023 09:19  98 - 107 mmol/L Final   12/06/2022 11:50  98 - 107 mmol/L Final   09/09/2022 08:06  98 - 107 mmol/L Final   08/20/2022 01:12 PM 98 98 - 107 mmol/L Final     Urea Nitrogen   Date/Time Value Ref Range Status   11/28/2023 09:19 PM 18 6 - 23 mg/dL Final   12/06/2022 11:50 AM 22 6 - 23 mg/dL Final   09/09/2022 08:06 PM 16 6 - 23 mg/dL Final   08/20/2022 01:12 PM 18 6 - 23 mg/dL Final     Creatinine   Date/Time Value Ref Range Status   11/28/2023 09:19 PM 0.79 0.50 - 1.05 mg/dL Final   12/06/2022 11:50 AM 0.86 0.50 - 1.05 mg/dL Final   09/09/2022 08:06 PM 0.73 0.50 - 1.05 mg/dL Final   08/20/2022 01:12 PM 0.77 0.50 - 1.05 mg/dL Final     GFR Female   Date/Time Value Ref Range Status   12/06/2022 11:50 AM 82 >90 mL/min/1.73m2 Final     Comment:      CALCULATIONS OF ESTIMATED GFR ARE PERFORMED   USING THE 2021 CKD-EPI STUDY REFIT EQUATION   WITHOUT THE RACE VARIABLE FOR THE IDMS-TRACEABLE   CREATININE METHODS.    https://jasn.asnjournals.org/content/early/2021/09/22/[email or web address]     09/09/2022 08:06 PM >90 >90 mL/min/1.73m2 Final     Comment:      CALCULATIONS OF ESTIMATED GFR ARE PERFORMED   USING THE 2021 CKD-EPI STUDY REFIT EQUATION   WITHOUT THE RACE VARIABLE FOR THE IDMS-TRACEABLE   CREATININE METHODS.    https://jasn.asnjournals.org/content/early/2021/09/22/[email or web address]     08/20/2022 01:12 PM >90 >90 mL/min/1.73m2 Final     Comment:      CALCULATIONS OF ESTIMATED GFR ARE PERFORMED   USING THE 2021 CKD-EPI STUDY REFIT EQUATION   WITHOUT THE RACE VARIABLE FOR THE IDMS-TRACEABLE   CREATININE METHODS.    https://jasn.asnjournals.org/content/early/2021/09/22/[email or web address]       eGFR   Date/Time Value Ref Range Status   11/28/2023 09:19 PM 90 >60 mL/min/1.73m*2 Final     Comment:     Calculations of estimated GFR are performed using the 2021 CKD-EPI  Study Refit equation without the race variable for the IDMS-Traceable creatinine methods.  https://jasn.asnjournals.org/content/early/2021/09/22/[email or web address]     Calcium   Date/Time Value Ref Range Status   11/28/2023 09:19 PM 8.9 8.6 - 10.3 mg/dL Final   12/06/2022 11:50 AM 9.1 8.6 - 10.3 mg/dL Final   09/09/2022 08:06 PM 9.9 8.6 - 10.6 mg/dL Final   08/20/2022 01:12 PM 8.9 8.6 - 10.3 mg/dL Final     Phosphorus   Date/Time Value Ref Range Status   06/28/2018 06:20 AM 2.6 2.5 - 4.9 mg/dL Final     Comment:      The performance characteristics of phosphorus testing in   heparinized plasma have been validated by the individual     laboratory site where testing is performed. Testing    on heparinized plasma is not approved by the FDA;    however, such approval is not necessary.     06/27/2018 04:35 AM 2.8 2.5 - 4.9 mg/dL Final     Comment:      The performance characteristics of phosphorus testing in   heparinized plasma have been validated by the individual     laboratory site where testing is performed. Testing    on heparinized plasma is not approved by the FDA;    however, such approval is not necessary.       Vitamin D, 25-Hydroxy   Date/Time Value Ref Range Status   03/09/2021 09:51 AM 26 (A) ng/mL Final     Comment:     .  DEFICIENCY:         < 20   NG/ML  INSUFFICIENCY:      20-29  NG/ML  SUFFICIENCY:         NG/ML    THIS ASSAY ACCURATELY QUANTIFIES THE SUM OF  VITAMIN D3, 25-HYDROXY AND VIT D2,25-HYDROXY.       Albumin/Creatine Ratio   Date/Time Value Ref Range Status   02/13/2024 10:50 AM   Final     Comment:     One or more analytes used in this calculation is outside of the analytical measurement range. Calculation cannot be performed.   07/27/2023 11:47 AM 8.9 0.0 - 30.0 ug/mg crt Final     Hemoglobin   Date/Time Value Ref Range Status   11/28/2023 09:19 PM 14.4 12.0 - 16.0 g/dL Final   12/06/2022 11:50 AM 13.7 12.0 - 16.0 g/dL Final   09/09/2022 08:06 PM 13.9 12.0 - 16.0 g/dL Final    08/20/2022 02:58 PM 13.3 12.0 - 16.0 g/dL Final      53 year old with long standing h/o htn referred for htn  BP currently well controlled, will continue hydralazine 25 mg PO BID and lisinopril 30 mg/day and Toprol  mg/day.Monitor BP at home, correct technique reviewed. Lifestyle measures such as avoidance of high sodium intake, alcohol and nicotine, regular physical exercise, stress management techniques and weight loss discussed. Encouraged to continue avoiding processed food, consider discussing anti-obesity medications with PCP.  Renal function normal in Nov 2023, monitor UACR and BMP annually with PCP.  All her questions answered to her satisfaction.  RTC: as needed

## 2024-02-22 NOTE — PATIENT INSTRUCTIONS
Www.kidney.org---> National Kidney Foundation website for low sodium diet (2.3 g/day of sodium ie. 5000 mg or 1 tsp of salt per day)  See me as needed

## 2024-03-05 ENCOUNTER — APPOINTMENT (OUTPATIENT)
Dept: OBSTETRICS AND GYNECOLOGY | Facility: CLINIC | Age: 54
End: 2024-03-05
Payer: COMMERCIAL

## 2024-03-13 ENCOUNTER — OFFICE VISIT (OUTPATIENT)
Dept: CARDIOLOGY | Facility: CLINIC | Age: 54
End: 2024-03-13
Payer: COMMERCIAL

## 2024-03-13 VITALS
BODY MASS INDEX: 35.85 KG/M2 | HEART RATE: 71 BPM | DIASTOLIC BLOOD PRESSURE: 70 MMHG | OXYGEN SATURATION: 99 % | HEIGHT: 64 IN | WEIGHT: 210 LBS | SYSTOLIC BLOOD PRESSURE: 110 MMHG

## 2024-03-13 DIAGNOSIS — R42 DIZZINESS: ICD-10-CM

## 2024-03-13 DIAGNOSIS — I25.10 CORONARY ARTERY DISEASE INVOLVING NATIVE CORONARY ARTERY OF NATIVE HEART WITHOUT ANGINA PECTORIS: ICD-10-CM

## 2024-03-13 DIAGNOSIS — Z98.61 CAD S/P PERCUTANEOUS CORONARY ANGIOPLASTY: Primary | ICD-10-CM

## 2024-03-13 DIAGNOSIS — I25.10 CAD S/P PERCUTANEOUS CORONARY ANGIOPLASTY: Primary | ICD-10-CM

## 2024-03-13 DIAGNOSIS — G47.33 OSA (OBSTRUCTIVE SLEEP APNEA): ICD-10-CM

## 2024-03-13 DIAGNOSIS — I50.20 ACC/AHA STAGE C SYSTOLIC HEART FAILURE (MULTI): ICD-10-CM

## 2024-03-13 PROCEDURE — 3062F POS MACROALBUMINURIA REV: CPT | Performed by: STUDENT IN AN ORGANIZED HEALTH CARE EDUCATION/TRAINING PROGRAM

## 2024-03-13 PROCEDURE — 4010F ACE/ARB THERAPY RXD/TAKEN: CPT | Performed by: STUDENT IN AN ORGANIZED HEALTH CARE EDUCATION/TRAINING PROGRAM

## 2024-03-13 PROCEDURE — 3074F SYST BP LT 130 MM HG: CPT | Performed by: STUDENT IN AN ORGANIZED HEALTH CARE EDUCATION/TRAINING PROGRAM

## 2024-03-13 PROCEDURE — 1036F TOBACCO NON-USER: CPT | Performed by: STUDENT IN AN ORGANIZED HEALTH CARE EDUCATION/TRAINING PROGRAM

## 2024-03-13 PROCEDURE — 99214 OFFICE O/P EST MOD 30 MIN: CPT | Performed by: STUDENT IN AN ORGANIZED HEALTH CARE EDUCATION/TRAINING PROGRAM

## 2024-03-13 PROCEDURE — 3078F DIAST BP <80 MM HG: CPT | Performed by: STUDENT IN AN ORGANIZED HEALTH CARE EDUCATION/TRAINING PROGRAM

## 2024-03-13 PROCEDURE — 3044F HG A1C LEVEL LT 7.0%: CPT | Performed by: STUDENT IN AN ORGANIZED HEALTH CARE EDUCATION/TRAINING PROGRAM

## 2024-03-13 NOTE — PATIENT INSTRUCTIONS
Please continue current cardiac medications including aspirin 81 mg, atorvastatin 80 mg daily, hydralazine 25 mg twice daily, lisinopril 30 mg daily, metoprolol succinate 100 mg daily, sublingual nitroglycerin as needed.    Please followup with me in Cardiology clinic within the next 6 months.  Please return to clinic sooner or seek emergent care if your symptoms reoccur or worsen.

## 2024-03-13 NOTE — PROGRESS NOTES
Follow-up (Carotid 2/16/)     HPI:    Lory Taylor is a 53 y.o. female with pertinent history of sleep apnea, tinnitus, type 2 diabetes, obstructive coronary artery disease status post PCI to LAD June 2018 with history of recovered ischemic cardiomyopathy, history acute on chronic diastolic heart failure, preserved ejection fraction with impaired relaxation, normalization of prior regional wall motion abnormalities, mild right ventricular and right atrial dilation, mild aortic regurgitation echo performed 7/20/2023 no obstructive disease on carotid Dopplers performed 2/16/2024 presents to cardiology clinic for follow up.     She is doing relatively well but has ongoing issues with her sinuses causing discomfort.  We reviewed and discussed her recent carotid Dopplers.  Dyspnea on exertion is stable.  She has minimal episodes of chest pressure.  She does note that her blood pressure occasionally spikes but is normally well-controlled.  No exacerbating or relieving factors.   Pt denies orthopnea, and paroxysmal nocturnal dyspnea.  Pt denies worsening lower extremity edema.  Pt denies palpitations or syncope.  No recent falls.  No fever or chills.  No cough.  No change in bowel or bladder habits.  No sick contacts.  No recent travel.    12 point review of systems including (Constitutional, Eyes, ENMT, Respiratory, Cardiac, Gastrointestinal, Neurological, Psychiatric, and Hematologic) was performed and is otherwise negative.    Past medical history:  As above.    Medications were reviewed.    Allergies were reviewed.    Family history:  No sudden cardiac death or premature coronary artery disease.     Social history reviewed:   reports that she has never smoked. She has never been exposed to tobacco smoke. She has never used smokeless tobacco. She reports that she does not drink alcohol and does not use drugs.     Allergies reviewed: Penicillins     Medications reviewed:   Current Outpatient Medications   Medication  Instructions    ALPRAZolam (XANAX) 0.5 mg, oral, See admin instructions, Take one tablet when leaving house ( must have ) and one when you arrive to the MRI then 1 as needed during exam    aspirin 81 mg, oral, Daily    atorvastatin (LIPITOR) 80 mg, oral, Daily    biotin 5 mg, oral, Daily    cetirizine (ZYRTEC) 10 mg, oral, Daily PRN    cholecalciferol (VITAMIN D-3) 25 mcg, oral, Daily    cyproheptadine (PERIACTIN) 2 mg, oral, 2 times daily    fluticasone (Flonase) 50 mcg/actuation nasal spray 2 sprays, nasal, Daily PRN    hydrALAZINE (APRESOLINE) 25 mg, oral, 2 times daily    hydrOXYzine HCL (ATARAX) 25 mg, oral, Every 8 hours PRN    lisinopril 30 mg, oral, Daily    magnesium 200 mg tablet 1 tablet, oral, Daily    metoprolol succinate XL (TOPROL-XL) 100 mg, oral, Daily    nitroglycerin (NITROSTAT) 0.4 mg, sublingual, Every 5 min PRN        Vitals reviewed: Visit Vitals  /70   Pulse 71       Physical Exam:   General:  Patient is awake, alert, and oriented.  Patient is in no acute distress.  HEENT:  Pupils equal and reactive.  Normocephalic.  Moist mucosa.    Neck:  No thyromegaly.  Normal Jugular Venous Pressure.  Cardiovascular:  Regular rate and rhythm.  Normal S1 and S2.  1/6 MATTHEW.  Pulmonary:  Clear to auscultation bilaterally.  Abdomen:  Soft. Non-tender.   Non-distended.  Positive bowel sounds.  Lower Extremities:  2+ pedal pulses. No LE edema.  Neurologic:  Cranial nerves intact.  No focal deficit.   Skin: Skin warm and dry, normal skin turgor.   Psychiatric: Normal affect.    Last Labs:  CBC -      Lab Results   Component Value Date    WBC 7.2 11/28/2023    HGB 14.4 11/28/2023    HCT 45.8 11/28/2023     11/28/2023        CMP-  Lab Results   Component Value Date    GLUCOSE 120 (H) 11/28/2023     (L) 11/28/2023    K 3.8 11/28/2023     11/28/2023    CO2 27 11/28/2023    ANIONGAP 11 11/28/2023    BUN 18 11/28/2023    CREATININE 0.79 11/28/2023    EGFR 90 11/28/2023    CALCIUM 8.9  11/28/2023    PHOS 2.6 06/28/2018    PROT 7.2 11/28/2023    ALBUMIN 4.2 11/28/2023    AST 22 11/28/2023    ALT 21 11/28/2023    ALKPHOS 50 11/28/2023    BILITOT 0.5 11/28/2023        LIPIDS-  Lab Results   Component Value Date    CHOL 128 03/09/2021    TRIG 34 03/09/2021    HDL 51.6 03/09/2021    CHHDL 2.5 03/09/2021    VLDL 7 03/09/2021        OTHERS-  Lab Results   Component Value Date    HGBA1C 6.4 (H) 02/13/2024    BNP 30 11/28/2023        I personally reviewed the patient's recent vitals, labs, medications, orders, EKGs, pertinent cardiac imaging/ echocardiography and ischemic evaluations including stress testing/ cardiac catheterization.    Assessment and Plan:      Lory Taylor is a 53 y.o. female with pertinent history of sleep apnea, tinnitus, type 2 diabetes, obstructive coronary artery disease status post PCI to LAD June 2018 with history of recovered ischemic cardiomyopathy, history acute on chronic diastolic heart failure, preserved ejection fraction with impaired relaxation, normalization of prior regional wall motion abnormalities, mild right ventricular and right atrial dilation, mild aortic regurgitation echo performed 7/20/2023 no obstructive disease on carotid Dopplers performed 2/16/2024 presents to cardiology clinic for follow up.  She is doing relatively well but has ongoing issues with her sinuses causing discomfort.  We reviewed and discussed her recent carotid Dopplers.  Dyspnea on exertion is stable.  She has minimal episodes of chest pressure.  She does note that her blood pressure occasionally spikes but is normally well-controlled.      Please continue current cardiac medications including aspirin 81 mg, atorvastatin 80 mg daily, hydralazine 25 mg twice daily, lisinopril 30 mg daily, metoprolol succinate 100 mg daily, sublingual nitroglycerin as needed.    Please followup with me in Cardiology clinic within the next 6 months.  Please return to clinic sooner or seek emergent care if  your symptoms reoccur or worsen.    Thank you for allowing me to participate in their care.  Please feel free to call me with any further questions or concerns.        Jonah Flores MD, FACC, SUZIE HAGER  Division of Cardiovascular Medicine  Medical Director, [facility] [place]  Clinical , [facility] [employer]  [email or web address]   Office:  [phone]

## 2024-03-15 DIAGNOSIS — E78.2 MIXED HYPERLIPIDEMIA: ICD-10-CM

## 2024-03-18 RX ORDER — ATORVASTATIN CALCIUM 80 MG/1
80 TABLET, FILM COATED ORAL DAILY
Qty: 30 TABLET | Refills: 0 | Status: SHIPPED | OUTPATIENT
Start: 2024-03-18 | End: 2024-04-01

## 2024-03-22 ENCOUNTER — APPOINTMENT (OUTPATIENT)
Dept: NEUROLOGY | Facility: HOSPITAL | Age: 54
End: 2024-03-22
Payer: MEDICARE

## 2024-03-31 DIAGNOSIS — E78.2 MIXED HYPERLIPIDEMIA: ICD-10-CM

## 2024-04-01 RX ORDER — ATORVASTATIN CALCIUM 80 MG/1
80 TABLET, FILM COATED ORAL DAILY
Qty: 30 TABLET | Refills: 0 | Status: SHIPPED | OUTPATIENT
Start: 2024-04-01 | End: 2024-04-26

## 2024-04-03 DIAGNOSIS — I25.10 CORONARY ARTERY DISEASE INVOLVING NATIVE HEART WITHOUT ANGINA PECTORIS, UNSPECIFIED VESSEL OR LESION TYPE: ICD-10-CM

## 2024-04-05 RX ORDER — ASPIRIN 81 MG/1
81 TABLET ORAL DAILY
Qty: 90 TABLET | Refills: 3 | Status: SHIPPED | OUTPATIENT
Start: 2024-04-05

## 2024-04-22 ENCOUNTER — CONSULT (OUTPATIENT)
Dept: ALLERGY | Facility: CLINIC | Age: 54
End: 2024-04-22
Payer: COMMERCIAL

## 2024-04-22 VITALS
OXYGEN SATURATION: 95 % | TEMPERATURE: 98.2 F | WEIGHT: 213.8 LBS | SYSTOLIC BLOOD PRESSURE: 131 MMHG | BODY MASS INDEX: 36.5 KG/M2 | HEART RATE: 63 BPM | DIASTOLIC BLOOD PRESSURE: 78 MMHG | HEIGHT: 64 IN

## 2024-04-22 DIAGNOSIS — R09.82 POST-NASAL DRAINAGE: Primary | ICD-10-CM

## 2024-04-22 DIAGNOSIS — Z88.0 PENICILLIN ALLERGY: ICD-10-CM

## 2024-04-22 DIAGNOSIS — J31.0 CHRONIC RHINITIS: ICD-10-CM

## 2024-04-22 PROCEDURE — 99203 OFFICE O/P NEW LOW 30 MIN: CPT | Performed by: ALLERGY & IMMUNOLOGY

## 2024-04-22 ASSESSMENT — ENCOUNTER SYMPTOMS
EYES NEGATIVE: 1
RESPIRATORY NEGATIVE: 1
HEMATOLOGIC/LYMPHATIC NEGATIVE: 1
MUSCULOSKELETAL NEGATIVE: 1
CARDIOVASCULAR NEGATIVE: 1
CONSTITUTIONAL NEGATIVE: 1
GASTROINTESTINAL NEGATIVE: 1
ALLERGIC/IMMUNOLOGIC NEGATIVE: 1

## 2024-04-22 ASSESSMENT — PAIN SCALES - GENERAL: PAINLEVEL: 0-NO PAIN

## 2024-04-22 NOTE — PROGRESS NOTES
"Lory Taylor presents for initial evaluation today.      Lory Taylor was seen at the request of Obdulio Grimm DO for a chief complaint of concern for allergies; a report with my findings is being sent via written or electronic means to Obdulio Grimm DO with my recommendations for treatment    She provides the following history:    She complains of nasal congestion and post-nasal drainage for the past 6-12 months.  She notes that she has always had sinus issues, but it has been worse in the past year.  The symptoms occur year round, and can't identify specific triggers.  She uses Flonase intermittently.      Asthma: Denies     Eczema: Denies     Food allergy:  Denies; dairy causes more mucous      Venom allergy:  Denies     Drug allergy:  Penicillin allergy, she was told as a baby but can't recall reaction.      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         Menopause symptoms   HENT: Negative.     Eyes: Negative.    Respiratory: Negative.     Cardiovascular: Negative.    Gastrointestinal: Negative.    Musculoskeletal: Negative.    Skin: Negative.    Allergic/Immunologic: Negative.    Hematological: Negative.         Vital signs:  /78   Pulse 63   Temp 36.8 °C (98.2 °F)   Ht 1.626 m (5' 4\")   Wt 97 kg (213 lb 12.8 oz)   SpO2 95%   BMI 36.70 kg/m²     Physical Exam:  GENERAL: Alert, oriented and in no acute distress.     HEENT: EYES: No conjunctival injection or cobblestoning. Nose: nasal turbinates mildly edematous and are not boggy.  There is no mucous stranding, polyps, or blood    noted. EARS: Tympanic membranes are clear. MOUTH: moist and pink with no " exudates, ulcers, or thrush. NECK: is supple, without adenopathy.  No upper airway stridor noted.       HEART: regular rate and rhythm.       LUNGS: Clear to auscultation bilaterally. No wheezing, rhonchi or rales.        ABDOMEN: Positive bowel sounds, soft, nontender, nondistended.       EXTREMITIES: No clubbing or edema.        NEURO:  Normal affect.  Gait normal.      SKIN: No rash, hives, or angioedema noted      Impression:  1. Post-nasal drainage    2. Chronic rhinitis    3. Penicillin allergy      Assessment and Plan:  Post-nasal drainage and chronic rhinitis:  Discussed symptoms today.  She would like to hold off on allergy testing until she can verify cost with her insurance company. CPT codes provided.  In the meantime, may use Flonase 2 sprays each nostril daily. We reviewed proper nasal spray administration technique.  We also discussed using a nasal saline sinus rinse and a sample was provided (advised to use distilled water).     History of penicillin allergy: Recommend penicillin skin testing to further evaluate and amoxicillin challenge if negative which she may schedule at her convenience.

## 2024-04-22 NOTE — PATIENT INSTRUCTIONS
It was nice to meet you today     Try nasal saline sinus rinses- recommend Sandeep Med nasal saline rinses.  Must use distilled water for this.    You may use Flonase 2 sprays each nostril once daily    Technique for nasal spray administration:  First, look slightly down, breathe normally, you do not need to sniff while you spray.  To use the nose spray, place the tip in your nose with the opposite hand (left hand, right side of nose, right hand, left side of nose), and aim or point toward the outside of the nose.  Do not sniff or snort medication afterwards as this can cause most of the medication to be swallowed.  Rather, dab your nose with a tissue if any runs out.      Please see below for allergy testing codes for your insurance company     Allergy testing codes:  CPT 37004, quantity 44     Penicillin allergy testing:  CPT 22410 quantity 4    Penicillin allergy challenge:  CPT 69668 quantity 1     Our nurse line phone number is [phone] and phone number for scheduling is [phone] extension [phone]

## 2024-04-25 DIAGNOSIS — E78.2 MIXED HYPERLIPIDEMIA: ICD-10-CM

## 2024-04-26 RX ORDER — ATORVASTATIN CALCIUM 80 MG/1
80 TABLET, FILM COATED ORAL DAILY
Qty: 30 TABLET | Refills: 0 | Status: SHIPPED | OUTPATIENT
Start: 2024-04-26 | End: 2024-06-07

## 2024-05-07 ENCOUNTER — OFFICE VISIT (OUTPATIENT)
Dept: OTOLARYNGOLOGY | Facility: CLINIC | Age: 54
End: 2024-05-07
Payer: COMMERCIAL

## 2024-05-07 VITALS — HEIGHT: 63 IN | TEMPERATURE: 97.2 F | WEIGHT: 215.5 LBS | BODY MASS INDEX: 38.18 KG/M2

## 2024-05-07 DIAGNOSIS — R09.81 NASAL CONGESTION WITH RHINORRHEA: Primary | ICD-10-CM

## 2024-05-07 DIAGNOSIS — G47.33 OSA (OBSTRUCTIVE SLEEP APNEA): ICD-10-CM

## 2024-05-07 DIAGNOSIS — J34.89 NASAL CONGESTION WITH RHINORRHEA: Primary | ICD-10-CM

## 2024-05-07 DIAGNOSIS — J34.2 DEVIATED NASAL SEPTUM: ICD-10-CM

## 2024-05-07 DIAGNOSIS — H93.13 TINNITUS OF BOTH EARS: ICD-10-CM

## 2024-05-07 DIAGNOSIS — J34.89 SINUS DRAINAGE: ICD-10-CM

## 2024-05-07 PROCEDURE — 3044F HG A1C LEVEL LT 7.0%: CPT | Performed by: OTOLARYNGOLOGY

## 2024-05-07 PROCEDURE — 3062F POS MACROALBUMINURIA REV: CPT | Performed by: OTOLARYNGOLOGY

## 2024-05-07 PROCEDURE — 99204 OFFICE O/P NEW MOD 45 MIN: CPT | Performed by: OTOLARYNGOLOGY

## 2024-05-07 PROCEDURE — 4010F ACE/ARB THERAPY RXD/TAKEN: CPT | Performed by: OTOLARYNGOLOGY

## 2024-05-07 RX ORDER — TRIAMCINOLONE ACETONIDE 55 UG/1
1 SPRAY, METERED NASAL 2 TIMES DAILY
Qty: 16.5 G | Refills: 11 | Status: SHIPPED | OUTPATIENT
Start: 2024-05-07 | End: 2025-05-07

## 2024-05-07 RX ORDER — AZELASTINE 1 MG/ML
2 SPRAY, METERED NASAL 2 TIMES DAILY
Qty: 30 ML | Refills: 11 | Status: SHIPPED | OUTPATIENT
Start: 2024-05-07 | End: 2025-05-07

## 2024-05-07 ASSESSMENT — PATIENT HEALTH QUESTIONNAIRE - PHQ9
1. LITTLE INTEREST OR PLEASURE IN DOING THINGS: NOT AT ALL
SUM OF ALL RESPONSES TO PHQ9 QUESTIONS 1 AND 2: 0
2. FEELING DOWN, DEPRESSED OR HOPELESS: NOT AT ALL

## 2024-05-07 NOTE — LETTER
May 8, 2024     Ru Landaverde MD PhD  [id number]  [facility] [address]    Patient: Lory Taylor   YOB: 1970   Date of Visit: 5/7/2024       Dear Dr. Ru Landaverde MD PhD:    Thank you for referring Lory Taylor to me for evaluation. Below are my notes for this consultation.  If you have questions, please do not hesitate to call me. I look forward to following your patient along with you.       Sincerely,     Darin Murrell MD      CC: No Recipients  ______________________________________________________________________________________    Chief Complaint:  Sinus and ear concerns    History Of Present Illness:  Reason For Visit:    Lory Taylor presents to me as a new patient.  She has been having sinus issues for a number of years.  During the past 2 years, she has been having a difficult to characterize sinus issue when she eats or drinks.  She feels something like a pulsating nasal pressure and imbalance.  She also has drainage associated with this.  These issues are typically triggered with drinking or eating but not 100% of the time but most of the time.    She underwent imaging in 2023 and was told that she had some findings within her sinuses.  She was also told that she had a deviated septum.    She has had left worse than right ear ringing for the past 12 to 24 months.  It can become louder and more bothersome particularly when she is having stressful periods.  It does not seem to be affecting her hearing.  She has a history of hypertension.    Main Symptoms:  Patient has chronic anterior nasal drainage.     Patient has  chronic posterior nasal drainage that is less significant than anterior nasal drainage.  Patient has intermittent nasal airway obstruction at baseline.   Patient has  facial pain at baseline and when she eats/drinks.   Patient has  facial pressure at baseline and when she eats/drinks.   Patient does not have decreased sense of smell.    Associated  Symptoms:   Patient has  headaches, recently improved over the last few weeks.   Patient does not have throat clearing.    Patient does not have coughing.    Patient has intermittent dysphonia.   Patient does not have nasal bleeding.     Medications currently on for sinonasal symptoms: None   Medications tried in the past for sinonasal symptoms:  Flonase discontinued 1-2 months ago    Other Pertinent Medical Conditions:   Patient does not have asthma.    Patient does not have aspirin sensitivity.    Patient does not have migraines.  Patient does not have history of allergy testing. Patient has seen Princess Jorge MD.   Patient does not have history of sinus surgery.    Patient does not have history of nasal fracture.    Patient does not have heartburn.    The patient does not take medical therapy for heartburn.   The patient has imaging of sinuses. When: CT sinus 7/14/23, CT head 11/28/23    She was evaluated by allergy last month and they had discussed allergy testing but this was deferred.  She was asked to initiate Flonase.  They also discussed a history of penicillin allergy and her allergist recommended penicillin skin testing.    The remainder of a full 10 systems review of systems was pan negative outside of that stated in the history of present illness.    Active Problems:  Patient Active Problem List   Diagnosis   • Type 2 diabetes mellitus without complication (Multi)   • Angina pectoris (CMS-[facility])   • Coronary artery disease without angina pectoris   • Hypomagnesemia   • Mixed hyperlipidemia   • Hypertensive heart disease without congestive heart failure   • Intermittent claudication of both lower extremities due to atherosclerosis (CMS-HCC)   • Morbid obesity (Multi)   • Occlusion and stenosis of bilateral carotid arteries   • ACC/AHA stage C systolic heart failure (Multi)   • CAD S/P percutaneous coronary angioplasty   • Combined form of age-related cataract, both eyes   • HTN (hypertension)   •  Imbalance   • Snoring   • Tinnitus   • KATEY (obstructive sleep apnea)      Past Medical History:  She has no past medical history on file.    Surgical History:  She has no past surgical history on file.     Family History:  No family history on file.    Social History:  She reports that she has never smoked. She has never been exposed to tobacco smoke. She has never used smokeless tobacco. She reports that she does not drink alcohol and does not use drugs.     Allergies:  Penicillins    Current Meds:    Current Outpatient Medications:   •  aspirin 81 mg EC tablet, Take 1 tablet (81 mg) by mouth once daily., Disp: 90 tablet, Rfl: 3  •  atorvastatin (Lipitor) 80 mg tablet, TAKE 1 TABLET (80 MG) BY MOUTH ONCE DAILY. ---DUE FOR LABS, Disp: 30 tablet, Rfl: 0  •  cholecalciferol (Vitamin D-3) 25 MCG (1000 UT) capsule, Take 1 capsule (25 mcg) by mouth once daily., Disp: , Rfl:   •  hydrALAZINE (Apresoline) 25 mg tablet, Take 1 tablet (25 mg) by mouth 2 times a day., Disp: 180 tablet, Rfl: 2  •  lisinopril 30 mg tablet, Take 1 tablet (30 mg) by mouth once daily., Disp: 90 tablet, Rfl: 3  •  magnesium 200 mg tablet, Take 1 tablet (200 mg) by mouth once daily., Disp: , Rfl:   •  metoprolol succinate XL (Toprol-XL) 100 mg 24 hr tablet, Take 1 tablet (100 mg) by mouth once daily., Disp: 90 tablet, Rfl: 2  •  nitroglycerin (Nitrostat) 0.4 mg SL tablet, Place 1 tablet (0.4 mg) under the tongue every 5 minutes if needed for chest pain., Disp: , Rfl:   •  taurine 500 mg capsule, Take 500 capsules by mouth once daily., Disp: , Rfl:   •  biotin 5 mg capsule, Take 1 capsule (5 mg) by mouth once daily., Disp: , Rfl:   •  fluticasone (Flonase) 50 mcg/actuation nasal spray, Administer 2 sprays into affected nostril(s) once daily as needed., Disp: , Rfl:   •  hydrOXYzine HCL (Atarax) 25 mg tablet, Take 1 tablet (25 mg) by mouth every 8 hours if needed for anxiety, allergies or itching. (Patient not taking: Reported on 4/22/2024), Disp: 12  "tablet, Rfl: 0    Vitals:  Visit Vitals  Temp 36.2 °C (97.2 °F)   Ht 1.6 m (5' 3\")   Wt 97.8 kg (215 lb 8 oz)   BMI 38.17 kg/m²   Smoking Status Never   BSA 2.08 m²    Resp: 12    Physical Exam:  CONSTITUTIONAL:  Vitals reviewed in nursing chart, well developed, well nourished.    RESPIRATION:  Breathing comfortably, no stridor.  CV:  No clubbing/cyanosis/edema in hands.  EYES:  EOM Intact, sclera normal.  NEURO:  Alert and oriented times 3, Cranial nerves 2-12 intact and symmetric bilaterally.  HEAD AND FACE:  Skin with no masses or lesions, sinuses nontender to palpation.  SALIVARY GLANDS:  Parotid and submandibular glands normal bilaterally.  EARS:  Normal external ears, external auditory canals, and TMs to otoscopy, normal hearing to whispered voice.  NOSE:  External nose midline, anterior rhinoscopy on the right demonstrated a normal middle meatus.  Examination of the left nasal cavity revealed a septal deviation.  ORAL CAVITY/OROPHARYNX/LIPS:  Normal mucous membranes, normal floor of mouth/tongue/OP, no masses or lesions are noted.  PHARYNGEAL WALLS AND NASOPHARYNX:  No masses noted.  NECK/LYMPH:  No LAD, no thyroid masses.  LARYNX:  Could not directly visualize transorally.    Results/Data:  I personally reviewed a CT head from November 28, 2023 and a CT sinus from September 14, 2023.  The CT sinus showed some inflammatory changes in the floor of each maxillary sinus and a septal deviation to the left.  The CT head did not extend to the floor of each maxillary sinus but within the visualized sinuses there was no significant mucosal inflammation.  There was expansion of the sella turcica on each of the studies.    I reviewed her last note from Dr. Patel April 22, 2024.  She recommended further workup for her penicillin allergy at that time.  I reviewed 2 notes from Dr. Saldaña from June 22, 2023 and November 22, 2022.  She was asked to initiate fluticasone during the initial evaluation and during the " subsequent vision she was asked to obtain an audiogram and a CT sinus.  She was also asked to be assessed by physical therapy for neck tension.    Provider Impressions:  1.  Sinonasal symptoms associated with eating and drinking  2.  Rhinorrhea  3.  Nasal airway obstruction; deviated septum to the left  4.  Facial pain and pressure, headaches  5.  Obstructive sleep apnea on positive pressure  6.  Expansion of the sella turcica of unclear cause    Discussion:  Lory Khannat her symptoms in detail today.  In regard to the symptoms she is having whenever she eats or drinks.  She has some difficulty specifically characterizing what is going on but there is certainly something that is happening within her sinuses during this time.  Although it does not happen every time she eats or drinks it happens most of the time and is disruptive for her.  She has normal CT imaging from 12 months ago and she was symptomatic at that time so I would not recommend another CT sinus at this juncture and I would not recommend surgical intervention for this issue.  I suggested additional trials of intranasal medical therapy.  Nasacort and Azelastine were prescribed.  I suggested beginning with 1 of those medications and use it consistently for 1 month.  We reviewed appropriate administration technique aiming away from her septum.  If after 4 weeks she is seeing benefit I recommended she continue the spray and if not discontinue it.  At that time I would like her to initiate the second spray.  There are no interactions between the sprays and they can be used together if she gets benefit with both.    We discussed the expansion of the sella turcica on her CT sinus.  This is an area at the base of her brain where the pituitary gland sits.  There are a number of causes of this and if she obtains an MRI this would further characterize that region and give us the information we need to make further clinical decisions.  She would like to  discuss this with her  so I recommended that she contact my office if she wishes to move forward with the MRI.  I strongly recommend she pursue this.     In regard to her ear ringing, I recommended an audiogram and follow-up with one of my otology partners to discuss strategies for management.  At this point in time, she was comfortable holding on this assessment.    I assked her to follow-up with me in 2 months to discuss her results with the sprays and to review the MRI if she chooses to undergo that study.  All questions were answered.    Patient Discussion/Summary:  Welcome to Dr. Darin Murrell's clinic. We are here to assist you with your ENT needs at [facility] [place]. Dr. Murrell is an ENT that specializes in nose, sinus, and skull base disorders.    Dr. Darin Murrell's office number is [phone]. Please call this number to contact his care team regardless of which office you use to access care. This number is the most direct way to communicate with all the members of the care team.    Martha Pastor CNP is a nurse practitioner who is a part of Dr. Murrell's team. She will work collaboratively with Dr. Murrell to meet your goals. This often may include seeing you for more urgent appointments or follow-up visits under Dr. Murrell's supervision.    Татьяна PATELN is Dr. Murrell's primary nurse. She can be reached by calling [phone]. Татьяна is available during business hours Tuesday through Friday. Martha PATELN is her rhinology nurse partner. Martha is available during business hours Monday through Thursday. Messages left for them will be returned within one business day. Татьяна is also Dr. Murrell's surgery scheduler and will assist you with planning and scheduling of your surgery during her office hours.     Angie Malagon is Dr. Murrell's  and you can reach her at [phone]. She can help you with scheduling of appointments,  general questions and information. She is available to receive calls Monday through Friday from 8:00 am until 4:25 pm.     For your convenience, Dr. Murrell sees patients at [facility] and [facility]. While we try to make your appointments as convenient as possible, occasionally a visit to another location may be necessary to provide the best care for you.    Dr. Murrell makes every effort to run on time for your appointments. Therefore, if you are more than 25 minutes late, your appointment will need to be rescheduled to another day. We appreciate your understanding.     We look forward to working with you to meet your healthcare goals.     Signature:  Scribe Attestation  By signing my name below, Malka COLMENARES Scribe   attest that this documentation has been prepared under the direction and in the presence of Darin Murrell MD.

## 2024-05-07 NOTE — PROGRESS NOTES
Chief Complaint:  Sinus and ear concerns    History Of Present Illness:  Reason For Visit:    Lory Taylor presents to me as a new patient.  She has been having sinus issues for a number of years.  During the past 2 years, she has been having a difficult to characterize sinus issue when she eats or drinks.  She feels something like a pulsating nasal pressure and imbalance.  She also has drainage associated with this.  These issues are typically triggered with drinking or eating but not 100% of the time but most of the time.    She underwent imaging in 2023 and was told that she had some findings within her sinuses.  She was also told that she had a deviated septum.    She has had left worse than right ear ringing for the past 12 to 24 months.  It can become louder and more bothersome particularly when she is having stressful periods.  It does not seem to be affecting her hearing.  She has a history of hypertension.    Main Symptoms:  Patient has chronic anterior nasal drainage.     Patient has  chronic posterior nasal drainage that is less significant than anterior nasal drainage.  Patient has intermittent nasal airway obstruction at baseline.   Patient has  facial pain at baseline and when she eats/drinks.   Patient has  facial pressure at baseline and when she eats/drinks.   Patient does not have decreased sense of smell.    Associated Symptoms:   Patient has  headaches, recently improved over the last few weeks.   Patient does not have throat clearing.    Patient does not have coughing.    Patient has intermittent dysphonia.   Patient does not have nasal bleeding.     Medications currently on for sinonasal symptoms: None   Medications tried in the past for sinonasal symptoms:  Flonase discontinued 1-2 months ago    Other Pertinent Medical Conditions:   Patient does not have asthma.    Patient does not have aspirin sensitivity.    Patient does not have migraines.  Patient does not have history of allergy  testing. Patient has seen Princess Jorge MD.   Patient does not have history of sinus surgery.    Patient does not have history of nasal fracture.    Patient does not have heartburn.    The patient does not take medical therapy for heartburn.   The patient has imaging of sinuses. When: CT sinus 7/14/23, CT head 11/28/23    She was evaluated by allergy last month and they had discussed allergy testing but this was deferred.  She was asked to initiate Flonase.  They also discussed a history of penicillin allergy and her allergist recommended penicillin skin testing.    The remainder of a full 10 systems review of systems was pan negative outside of that stated in the history of present illness.    Active Problems:  Patient Active Problem List   Diagnosis    Type 2 diabetes mellitus without complication (Multi)    Angina pectoris (CMS-HCC)    Coronary artery disease without angina pectoris    Hypomagnesemia    Mixed hyperlipidemia    Hypertensive heart disease without congestive heart failure    Intermittent claudication of both lower extremities due to atherosclerosis (CMS-[facility])    Morbid obesity (Multi)    Occlusion and stenosis of bilateral carotid arteries    ACC/AHA stage C systolic heart failure (Multi)    CAD S/P percutaneous coronary angioplasty    Combined form of age-related cataract, both eyes    HTN (hypertension)    Imbalance    Snoring    Tinnitus    KATEY (obstructive sleep apnea)      Past Medical History:  She has no past medical history on file.    Surgical History:  She has no past surgical history on file.     Family History:  No family history on file.    Social History:  She reports that she has never smoked. She has never been exposed to tobacco smoke. She has never used smokeless tobacco. She reports that she does not drink alcohol and does not use drugs.     Allergies:  Penicillins    Current Meds:    Current Outpatient Medications:     aspirin 81 mg EC tablet, Take 1 tablet (81 mg) by mouth  "once daily., Disp: 90 tablet, Rfl: 3    atorvastatin (Lipitor) 80 mg tablet, TAKE 1 TABLET (80 MG) BY MOUTH ONCE DAILY. ---DUE FOR LABS, Disp: 30 tablet, Rfl: 0    cholecalciferol (Vitamin D-3) 25 MCG (1000 UT) capsule, Take 1 capsule (25 mcg) by mouth once daily., Disp: , Rfl:     hydrALAZINE (Apresoline) 25 mg tablet, Take 1 tablet (25 mg) by mouth 2 times a day., Disp: 180 tablet, Rfl: 2    lisinopril 30 mg tablet, Take 1 tablet (30 mg) by mouth once daily., Disp: 90 tablet, Rfl: 3    magnesium 200 mg tablet, Take 1 tablet (200 mg) by mouth once daily., Disp: , Rfl:     metoprolol succinate XL (Toprol-XL) 100 mg 24 hr tablet, Take 1 tablet (100 mg) by mouth once daily., Disp: 90 tablet, Rfl: 2    nitroglycerin (Nitrostat) 0.4 mg SL tablet, Place 1 tablet (0.4 mg) under the tongue every 5 minutes if needed for chest pain., Disp: , Rfl:     taurine 500 mg capsule, Take 500 capsules by mouth once daily., Disp: , Rfl:     biotin 5 mg capsule, Take 1 capsule (5 mg) by mouth once daily., Disp: , Rfl:     fluticasone (Flonase) 50 mcg/actuation nasal spray, Administer 2 sprays into affected nostril(s) once daily as needed., Disp: , Rfl:     hydrOXYzine HCL (Atarax) 25 mg tablet, Take 1 tablet (25 mg) by mouth every 8 hours if needed for anxiety, allergies or itching. (Patient not taking: Reported on 4/22/2024), Disp: 12 tablet, Rfl: 0    Vitals:  Visit Vitals  Temp 36.2 °C (97.2 °F)   Ht 1.6 m (5' 3\")   Wt 97.8 kg (215 lb 8 oz)   BMI 38.17 kg/m²   Smoking Status Never   BSA 2.08 m²    Resp: 12    Physical Exam:  CONSTITUTIONAL:  Vitals reviewed in nursing chart, well developed, well nourished.    RESPIRATION:  Breathing comfortably, no stridor.  CV:  No clubbing/cyanosis/edema in hands.  EYES:  EOM Intact, sclera normal.  NEURO:  Alert and oriented times 3, Cranial nerves 2-12 intact and symmetric bilaterally.  HEAD AND FACE:  Skin with no masses or lesions, sinuses nontender to palpation.  SALIVARY GLANDS:  Parotid and " submandibular glands normal bilaterally.  EARS:  Normal external ears, external auditory canals, and TMs to otoscopy, normal hearing to whispered voice.  NOSE:  External nose midline, anterior rhinoscopy on the right demonstrated a normal middle meatus.  Examination of the left nasal cavity revealed a septal deviation.  ORAL CAVITY/OROPHARYNX/LIPS:  Normal mucous membranes, normal floor of mouth/tongue/OP, no masses or lesions are noted.  PHARYNGEAL WALLS AND NASOPHARYNX:  No masses noted.  NECK/LYMPH:  No LAD, no thyroid masses.  LARYNX:  Could not directly visualize transorally.    Results/Data:  I personally reviewed a CT head from November 28, 2023 and a CT sinus from September 14, 2023.  The CT sinus showed some inflammatory changes in the floor of each maxillary sinus and a septal deviation to the left.  The CT head did not extend to the floor of each maxillary sinus but within the visualized sinuses there was no significant mucosal inflammation.  There was expansion of the sella turcica on each of the studies.    I reviewed her last note from Dr. Patel April 22, 2024.  She recommended further workup for her penicillin allergy at that time.  I reviewed 2 notes from Dr. Saldaña from June 22, 2023 and November 22, 2022.  She was asked to initiate fluticasone during the initial evaluation and during the subsequent vision she was asked to obtain an audiogram and a CT sinus.  She was also asked to be assessed by physical therapy for neck tension.    Provider Impressions:  1.  Sinonasal symptoms associated with eating and drinking  2.  Rhinorrhea  3.  Nasal airway obstruction; deviated septum to the left  4.  Facial pain and pressure, headaches  5.  Obstructive sleep apnea on positive pressure  6.  Expansion of the sella turcica of unclear cause    Discussion:  Lory Khannat her symptoms in detail today.  In regard to the symptoms she is having whenever she eats or drinks.  She has some difficulty specifically  characterizing what is going on but there is certainly something that is happening within her sinuses during this time.  Although it does not happen every time she eats or drinks it happens most of the time and is disruptive for her.  She has normal CT imaging from 12 months ago and she was symptomatic at that time so I would not recommend another CT sinus at this juncture and I would not recommend surgical intervention for this issue.  I suggested additional trials of intranasal medical therapy.  Nasacort and Azelastine were prescribed.  I suggested beginning with 1 of those medications and use it consistently for 1 month.  We reviewed appropriate administration technique aiming away from her septum.  If after 4 weeks she is seeing benefit I recommended she continue the spray and if not discontinue it.  At that time I would like her to initiate the second spray.  There are no interactions between the sprays and they can be used together if she gets benefit with both.    We discussed the expansion of the sella turcica on her CT sinus.  This is an area at the base of her brain where the pituitary gland sits.  There are a number of causes of this and if she obtains an MRI this would further characterize that region and give us the information we need to make further clinical decisions.  She would like to discuss this with her  so I recommended that she contact my office if she wishes to move forward with the MRI.  I strongly recommend she pursue this.     In regard to her ear ringing, I recommended an audiogram and follow-up with one of my otology partners to discuss strategies for management.  At this point in time, she was comfortable holding on this assessment.    I assked her to follow-up with me in 2 months to discuss her results with the sprays and to review the MRI if she chooses to undergo that study.  All questions were answered.    Patient Discussion/Summary:  Welcome to Dr. Darin Murrell's  clinic. We are here to assist you with your ENT needs at [facility]. Dr. Murrell is an ENT that specializes in nose, sinus, and skull base disorders.    Dr. Darin Murrell's office number is [phone]. Please call this number to contact his care team regardless of which office you use to access care. This number is the most direct way to communicate with all the members of the care team.    Martha Pastor CNP is a nurse practitioner who is a part of Dr. Murrell's team. She will work collaboratively with Dr. Murrell to meet your goals. This often may include seeing you for more urgent appointments or follow-up visits under Dr. Murrell's supervision.    Татьяна Lopez RN BSN is Dr. Murrell's primary nurse. She can be reached by calling [phone]. Татьяна is available during business hours Tuesday through Friday. Martha PATELN is her rhinology nurse partner. Martha is available during business hours Monday through Thursday. Messages left for them will be returned within one business day. Татьяна is also Dr. Murrell's surgery scheduler and will assist you with planning and scheduling of your surgery during her office hours.     Angie Malagon is Dr. Murrell's  and you can reach her at [phone]. She can help you with scheduling of appointments, general questions and information. She is available to receive calls Monday through Friday from 8:00 am until 4:25 pm.     For your convenience, Dr. Murrell sees patients at [facility] and [facility]. While we try to make your appointments as convenient as possible, occasionally a visit to another location may be necessary to provide the best care for you.    Dr. Murrell makes every effort to run on time for your appointments. Therefore, if you are more than 25 minutes late, your appointment will need to be rescheduled to another day. We appreciate your understanding.      We look forward to working with you to meet your healthcare goals.     Signature:  Scribe Attestation  By signing my name below, IMalka Scribe   attest that this documentation has been prepared under the direction and in the presence of Darin Murrell MD.

## 2024-05-08 DIAGNOSIS — E23.0: ICD-10-CM

## 2024-05-12 DIAGNOSIS — I10 PRIMARY HYPERTENSION: ICD-10-CM

## 2024-05-14 RX ORDER — HYDRALAZINE HYDROCHLORIDE 25 MG/1
25 TABLET, FILM COATED ORAL 2 TIMES DAILY
Qty: 180 TABLET | Refills: 1 | Status: SHIPPED | OUTPATIENT
Start: 2024-05-14

## 2024-05-20 ENCOUNTER — TELEPHONE (OUTPATIENT)
Dept: OTOLARYNGOLOGY | Facility: CLINIC | Age: 54
End: 2024-05-20
Payer: COMMERCIAL

## 2024-05-20 DIAGNOSIS — J34.89 NASAL CONGESTION WITH RHINORRHEA: Primary | ICD-10-CM

## 2024-05-20 DIAGNOSIS — R09.81 NASAL CONGESTION WITH RHINORRHEA: Primary | ICD-10-CM

## 2024-05-20 RX ORDER — DIAZEPAM 2 MG/1
TABLET ORAL
Qty: 2 TABLET | Refills: 0 | Status: SHIPPED | OUTPATIENT
Start: 2024-05-20

## 2024-05-20 NOTE — TELEPHONE ENCOUNTER
Patient calling requesting medication for anxiety related to MRI imaging. Scheduled for tomorrow morning. Rx sent for Valium 2mg. Patient verbalizes understanding.

## 2024-05-23 ENCOUNTER — CLINICAL SUPPORT (OUTPATIENT)
Dept: EMERGENCY MEDICINE | Facility: HOSPITAL | Age: 54
End: 2024-05-23
Payer: COMMERCIAL

## 2024-05-23 ENCOUNTER — HOSPITAL ENCOUNTER (OUTPATIENT)
Dept: RADIOLOGY | Facility: CLINIC | Age: 54
End: 2024-05-23
Payer: COMMERCIAL

## 2024-05-23 ENCOUNTER — HOSPITAL ENCOUNTER (EMERGENCY)
Facility: HOSPITAL | Age: 54
Discharge: HOME | End: 2024-05-23
Payer: COMMERCIAL

## 2024-05-23 ENCOUNTER — APPOINTMENT (OUTPATIENT)
Dept: RADIOLOGY | Facility: HOSPITAL | Age: 54
End: 2024-05-23
Payer: COMMERCIAL

## 2024-05-23 VITALS
DIASTOLIC BLOOD PRESSURE: 82 MMHG | TEMPERATURE: 97.5 F | WEIGHT: 220 LBS | HEART RATE: 51 BPM | SYSTOLIC BLOOD PRESSURE: 151 MMHG | HEIGHT: 63 IN | RESPIRATION RATE: 18 BRPM | BODY MASS INDEX: 38.98 KG/M2 | OXYGEN SATURATION: 100 %

## 2024-05-23 DIAGNOSIS — R51.9 FRONTAL HEADACHE: ICD-10-CM

## 2024-05-23 DIAGNOSIS — I16.0 ASYMPTOMATIC HYPERTENSIVE URGENCY: Primary | ICD-10-CM

## 2024-05-23 LAB
ALBUMIN SERPL BCP-MCNC: 3.6 G/DL (ref 3.4–5)
ALP SERPL-CCNC: 62 U/L (ref 33–110)
ALT SERPL W P-5'-P-CCNC: 25 U/L (ref 7–45)
ANION GAP SERPL CALC-SCNC: 12 MMOL/L (ref 10–20)
AST SERPL W P-5'-P-CCNC: 22 U/L (ref 9–39)
ATRIAL RATE: 66 BPM
BASOPHILS # BLD AUTO: 0.04 X10*3/UL (ref 0–0.1)
BASOPHILS NFR BLD AUTO: 0.7 %
BILIRUB SERPL-MCNC: 0.4 MG/DL (ref 0–1.2)
BUN SERPL-MCNC: 16 MG/DL (ref 6–23)
CALCIUM SERPL-MCNC: 8.9 MG/DL (ref 8.6–10.6)
CARDIAC TROPONIN I PNL SERPL HS: 4 NG/L (ref 0–34)
CARDIAC TROPONIN I PNL SERPL HS: 4 NG/L (ref 0–34)
CHLORIDE SERPL-SCNC: 104 MMOL/L (ref 98–107)
CO2 SERPL-SCNC: 26 MMOL/L (ref 21–32)
CREAT SERPL-MCNC: 0.78 MG/DL (ref 0.5–1.05)
EGFRCR SERPLBLD CKD-EPI 2021: >90 ML/MIN/1.73M*2
EOSINOPHIL # BLD AUTO: 0.09 X10*3/UL (ref 0–0.7)
EOSINOPHIL NFR BLD AUTO: 1.6 %
ERYTHROCYTE [DISTWIDTH] IN BLOOD BY AUTOMATED COUNT: 12 % (ref 11.5–14.5)
GLUCOSE SERPL-MCNC: 145 MG/DL (ref 74–99)
HCT VFR BLD AUTO: 41.3 % (ref 36–46)
HGB BLD-MCNC: 13.7 G/DL (ref 12–16)
IMM GRANULOCYTES # BLD AUTO: 0.01 X10*3/UL (ref 0–0.7)
IMM GRANULOCYTES NFR BLD AUTO: 0.2 % (ref 0–0.9)
LYMPHOCYTES # BLD AUTO: 1.4 X10*3/UL (ref 1.2–4.8)
LYMPHOCYTES NFR BLD AUTO: 25.3 %
MAGNESIUM SERPL-MCNC: 1.68 MG/DL (ref 1.6–2.4)
MCH RBC QN AUTO: 30.9 PG (ref 26–34)
MCHC RBC AUTO-ENTMCNC: 33.2 G/DL (ref 32–36)
MCV RBC AUTO: 93 FL (ref 80–100)
MONOCYTES # BLD AUTO: 0.46 X10*3/UL (ref 0.1–1)
MONOCYTES NFR BLD AUTO: 8.3 %
NEUTROPHILS # BLD AUTO: 3.53 X10*3/UL (ref 1.2–7.7)
NEUTROPHILS NFR BLD AUTO: 63.9 %
NRBC BLD-RTO: 0 /100 WBCS (ref 0–0)
P AXIS: 33 DEGREES
P OFFSET: 184 MS
P ONSET: 119 MS
PLATELET # BLD AUTO: 165 X10*3/UL (ref 150–450)
POTASSIUM SERPL-SCNC: 3.7 MMOL/L (ref 3.5–5.3)
PR INTERVAL: 192 MS
PROT SERPL-MCNC: 6.8 G/DL (ref 6.4–8.2)
Q ONSET: 215 MS
QRS COUNT: 10 BEATS
QRS DURATION: 88 MS
QT INTERVAL: 396 MS
QTC CALCULATION(BAZETT): 415 MS
QTC FREDERICIA: 409 MS
R AXIS: 29 DEGREES
RBC # BLD AUTO: 4.44 X10*6/UL (ref 4–5.2)
SODIUM SERPL-SCNC: 138 MMOL/L (ref 136–145)
T AXIS: -11 DEGREES
T OFFSET: 413 MS
VENTRICULAR RATE: 66 BPM
WBC # BLD AUTO: 5.5 X10*3/UL (ref 4.4–11.3)

## 2024-05-23 PROCEDURE — 84484 ASSAY OF TROPONIN QUANT: CPT | Performed by: NURSE PRACTITIONER

## 2024-05-23 PROCEDURE — 85025 COMPLETE CBC W/AUTO DIFF WBC: CPT | Performed by: NURSE PRACTITIONER

## 2024-05-23 PROCEDURE — 70450 CT HEAD/BRAIN W/O DYE: CPT | Performed by: RADIOLOGY

## 2024-05-23 PROCEDURE — 71046 X-RAY EXAM CHEST 2 VIEWS: CPT | Mod: FOREIGN READ | Performed by: RADIOLOGY

## 2024-05-23 PROCEDURE — 70450 CT HEAD/BRAIN W/O DYE: CPT

## 2024-05-23 PROCEDURE — 83735 ASSAY OF MAGNESIUM: CPT | Performed by: NURSE PRACTITIONER

## 2024-05-23 PROCEDURE — 36415 COLL VENOUS BLD VENIPUNCTURE: CPT | Performed by: NURSE PRACTITIONER

## 2024-05-23 PROCEDURE — 71046 X-RAY EXAM CHEST 2 VIEWS: CPT

## 2024-05-23 PROCEDURE — 99285 EMERGENCY DEPT VISIT HI MDM: CPT | Performed by: NURSE PRACTITIONER

## 2024-05-23 PROCEDURE — 80053 COMPREHEN METABOLIC PANEL: CPT | Performed by: NURSE PRACTITIONER

## 2024-05-23 PROCEDURE — 2500000001 HC RX 250 WO HCPCS SELF ADMINISTERED DRUGS (ALT 637 FOR MEDICARE OP): Performed by: NURSE PRACTITIONER

## 2024-05-23 PROCEDURE — 99284 EMERGENCY DEPT VISIT MOD MDM: CPT

## 2024-05-23 PROCEDURE — 93005 ELECTROCARDIOGRAM TRACING: CPT

## 2024-05-23 RX ORDER — HYDRALAZINE HYDROCHLORIDE 25 MG/1
25 TABLET, FILM COATED ORAL ONCE
Status: COMPLETED | OUTPATIENT
Start: 2024-05-23 | End: 2024-05-23

## 2024-05-23 RX ADMIN — HYDRALAZINE HYDROCHLORIDE 25 MG: 25 TABLET ORAL at 13:11

## 2024-05-23 ASSESSMENT — ENCOUNTER SYMPTOMS
SHORTNESS OF BREATH: 0
FEVER: 0
COLOR CHANGE: 0
ARTHRALGIAS: 0
LIGHT-HEADEDNESS: 0
DYSURIA: 0
HEMATURIA: 0
SORE THROAT: 0
FACIAL ASYMMETRY: 0
PALPITATIONS: 0
BACK PAIN: 0
ABDOMINAL PAIN: 0
EYE PAIN: 0
NUMBNESS: 0
WEAKNESS: 0
COUGH: 0
DIZZINESS: 0
SEIZURES: 0
VOMITING: 0
CHILLS: 0
HEADACHES: 1

## 2024-05-23 NOTE — ED PROVIDER NOTES
Emergency Department Encounter  [facility] EMERGENCY MEDICINE    Patient: Lory Taylor  MRN: [id number]  : 1970  Date of Evaluation: 2024  ED Provider: HELGA Monique      Chief Complaint       Chief Complaint   Patient presents with    Hypertension     [place]    (Location/Symptom, Timing/Onset, Context/Setting, Quality, Duration, Modifying Factors, Severity) Note limiting factors.   Limitations to History: None  Historian: Patient  Records reviewed: EMR inpatient and outpatient notes, Care Everywhere      Lory Taylor is a 53 y.o. female who presents to the emergency department complaining of elevated blood pressure.  Patient states at home her blood pressure was 201 systolic, patient states this happens at times, usually has to come to the ER for evaluation, she states her normal blood pressure is less than 120 systolic.  She has been seen for this, outpatient by neurology and both cardiology.  She mentions that she has a chronic frontal headache, was seen by neurology had an MRI of the pituitary completed, showing no acute process.  She was seen by her cardiologist for these elevated blood pressure readings, per notes, was advised to take an extra dosing of hydralazine if needed.  Patient currently only complains of intermittent chest pressure, none currently on exam.  She has no complaints of headache, lightheadedness, dizziness, chest pain, shortness of breath, swelling of lower extremities.    ROS:     Review of Systems   Constitutional:  Negative for chills and fever.   HENT:  Negative for ear pain and sore throat.    Eyes:  Negative for pain and visual disturbance.   Respiratory:  Negative for cough and shortness of breath.    Cardiovascular:  Positive for chest pain (Chronic chest pressure). Negative for palpitations.   Gastrointestinal:  Negative for abdominal pain and vomiting.   Genitourinary:  Negative for dysuria and hematuria.   Musculoskeletal:  Negative for  arthralgias and back pain.   Skin:  Negative for color change and rash.   Neurological:  Positive for headaches (Chronic frontal sinus headache). Negative for dizziness, seizures, syncope, facial asymmetry, weakness, light-headedness and numbness.   All other systems reviewed and are negative.    14 systems reviewed and otherwise acutely negative except as in the [place].          Past History   No past medical history on file.  No past surgical history on file.  Social History     Socioeconomic History    Marital status:      Spouse name: Not on file    Number of children: Not on file    Years of education: Not on file    Highest education level: Not on file   Occupational History    Not on file   Tobacco Use    Smoking status: Never     Passive exposure: Never    Smokeless tobacco: Never   Substance and Sexual Activity    Alcohol use: Never    Drug use: Never    Sexual activity: Not on file   Other Topics Concern    Not on file   Social History Narrative    Not on file     Social Determinants of Health     Financial Resource Strain: Low Risk  (7/24/2023)    Received from [facility]    Overall Financial Resource Strain (CARDIA)     Difficulty of Paying Living Expenses: Not very hard   Food Insecurity: No Food Insecurity (2/13/2024)    Hunger Vital Sign     Worried About Running Out of Food in the Last Year: Never true     Ran Out of Food in the Last Year: Never true   Transportation Needs: No Transportation Needs (7/24/2023)    Received from [facility]    PRAPARE - Transportation     Lack of Transportation (Medical): No     Lack of Transportation (Non-Medical): No   Physical Activity: Unknown (7/24/2023)    Received from [facility]    Exercise Vital Sign     Days of Exercise per Week: Patient declined     Minutes of Exercise per Session: Patient declined   Stress: Unknown (7/24/2023)    Received from [facility]    [place] [employer] - Occupational Stress  Questionnaire     Feeling of Stress : Patient declined   Social Connections: Unknown (7/24/2023)    Received from [facility]    Social Connection and Isolation Panel [NHANES]     Frequency of Communication with Friends and Family: Patient declined     Frequency of Social Gatherings with Friends and Family: Patient declined     Attends [employer] Services: More than 4 times per year     Active Member of Clubs or Organizations: Yes     Attends Club or Organization Meetings: Patient declined     Marital Status:    Intimate Partner Violence: Not on file   Housing Stability: Unknown (7/24/2023)    Received from [facility]    Housing Stability Vital Sign     Unable to Pay for Housing in the Last Year: No     Number of Places Lived in the Last Year: Not on file     Unstable Housing in the Last Year: No       Medications/Allergies     Previous Medications    ASPIRIN 81 MG EC TABLET    Take 1 tablet (81 mg) by mouth once daily.    ATORVASTATIN (LIPITOR) 80 MG TABLET    TAKE 1 TABLET (80 MG) BY MOUTH ONCE DAILY. ---DUE FOR LABS    AZELASTINE (ASTELIN) 137 MCG (0.1 %) NASAL SPRAY    Administer 2 sprays into each nostril 2 times a day. Use in each nostril as directed    BIOTIN 5 MG CAPSULE    Take 1 capsule (5 mg) by mouth once daily.    CHOLECALCIFEROL (VITAMIN D-3) 25 MCG (1000 UT) CAPSULE    Take 1 capsule (25 mcg) by mouth once daily.    DIAZEPAM (VALIUM) 2 MG TABLET    Take 1 tablet 30-60 minutes prior to MRI. May repeat if needed immediately prior.    FLUTICASONE (FLONASE) 50 MCG/ACTUATION NASAL SPRAY    Administer 2 sprays into affected nostril(s) once daily as needed.    HYDRALAZINE (APRESOLINE) 25 MG TABLET    Take 1 tablet (25 mg) by mouth 2 times a day.    HYDROXYZINE HCL (ATARAX) 25 MG TABLET    Take 1 tablet (25 mg) by mouth every 8 hours if needed for anxiety, allergies or itching.    LISINOPRIL 30 MG TABLET    Take 1 tablet (30 mg) by mouth once daily.    MAGNESIUM 200 MG TABLET    Take 1 tablet  (200 mg) by mouth once daily.    METOPROLOL SUCCINATE XL (TOPROL-XL) 100 MG 24 HR TABLET    Take 1 tablet (100 mg) by mouth once daily.    NITROGLYCERIN (NITROSTAT) 0.4 MG SL TABLET    Place 1 tablet (0.4 mg) under the tongue every 5 minutes if needed for chest pain.    TAURINE 500 MG CAPSULE    Take 500 capsules by mouth once daily.    TRIAMCINOLONE (NASACORT) 55 MCG NASAL INHALER    Administer 1 spray into each nostril 2 times a day.     Allergies   Allergen Reactions    Penicillins Unknown and Rash     Told as a baby had a reaction        Physical Exam       ED Triage Vitals [05/23/24 1135]   Temperature Heart Rate Respirations BP   36.4 °C (97.5 °F) 66 18 (!) 198/81      Pulse Ox Temp Source Heart Rate Source Patient Position   100 % Oral -- --      BP Location FiO2 (%)     -- --         Physical Exam  Constitutional:       General: She is not in acute distress.     Appearance: She is well-developed. She is not ill-appearing.   HENT:      Head: Normocephalic and atraumatic.   Eyes:      Extraocular Movements: Extraocular movements intact.      Pupils: Pupils are equal, round, and reactive to light.   Cardiovascular:      Rate and Rhythm: Normal rate and regular rhythm.      Pulses: Normal pulses.      Heart sounds: Normal heart sounds.   Pulmonary:      Effort: Pulmonary effort is normal.      Breath sounds: Normal breath sounds.   Abdominal:      General: Bowel sounds are normal.      Palpations: Abdomen is soft.      Tenderness: There is no abdominal tenderness.   Musculoskeletal:         General: Normal range of motion.      Cervical back: Normal range of motion and neck supple.      Right lower leg: No edema.      Left lower leg: No edema.   Skin:     General: Skin is warm and dry.      Capillary Refill: Capillary refill takes less than 2 seconds.   Neurological:      General: No focal deficit present.      Mental Status: She is alert and oriented to person, place, and time.   Psychiatric:         Mood and  Affect: Mood normal.         Behavior: Behavior normal.         Diagnostics   Labs:  Labs Reviewed   SERIAL TROPONIN-INITIAL - Normal       Result Value    Troponin I, High Sensitivity 4      Narrative:     Less than 99th percentile of normal range cutoff-  Female and children under 18 years old <35 ng/L; Male <54 ng/L: Negative  Repeat testing should be performed if clinically indicated.     Female and children under 18 years old  ng/L; Male  ng/L:  Consistent with possible cardiac damage and possible increased clinical   risk. Serial measurements may help to assess extent of myocardial damage.     >120 ng/L: Consistent with cardiac damage, increased clinical risk and  myocardial infarction. Serial measurements may help assess extent of   myocardial damage.      NOTE: Children less than 1 year old may have higher baseline troponin   levels and results should be interpreted in conjunction with the overall   clinical context.    NOTE: Troponin I testing is performed using a different   testing methodology at [facility] than at other   [facility]. Direct result comparisons should only   be made within the same method.     CBC WITH AUTO DIFFERENTIAL   MAGNESIUM   COMPREHENSIVE METABOLIC PANEL   TROPONIN SERIES- (INITIAL, 1 HR)    Narrative:     The following orders were created for panel order Troponin I Series, High Sensitivity (0, 1 HR).  Procedure                               Abnormality         Status                     ---------                               -----------         ------                     Troponin I, High Sensiti...[[id number]]  Normal              Final result               Troponin, High Sensitivi...[[id number]]                                                   Please view results for these tests on the individual orders.   SERIAL TROPONIN, 1 HOUR     Radiographs:  XR chest 2 views    (Results Pending)   CT head wo IV contrast    (Results Pending)  "            Assessment   In brief, Lory Taylor is a 53 y.o. female who presented to the emergency department to the ED with complaints of elevated blood pressure reading.  In the emergency department, patient was greater than 200 systolic in triage.  Patient states this is also this way at home.  She normally is less than 120 systolic reading.  Patient has intermittent chest pressure, which is chronic, seen by cardiology, who recommended this is likely secondary to hypertension.  She has had an echocardiogram, carotid ultrasound, MRI of the brain.  She was also seen by neurology for evaluation of this chronic frontal headache.    On exam, patient is awake and alert, resting in chair, appears in no acute distress.  Respirations even unlabored, lung sounds clear.  S1-S2 present without tachycardia or murmur.  Abdomen soft, nontender nondistended.  Bowel sounds present.  No peripheral edema.  Moving all 4 extremities without issue, speaking in full sentences.    Patient's repeat blood pressure was observed 151/82, patient was provided with 25 mg additional hydralazine as outlined in her cardiologist note as needed for elevated blood pressures.  Patient's ED course is currently pending troponin, CBC, CMP, magnesium, chest x-ray and CT brain, EKG.    Patient remains asymptomatic on repeat exam    Patient was handed off to BOBBY Gomez CNP    Plan   If patient's blood pressure remained stable, with negative ED workup, the patient is likely to be discharged home with outpatient follow-up with her cardiology team    Differentials   Hypertensive urgency  Hypertensive emergency   ICH    ED Course     Diagnoses as of 05/23/24 1401   Asymptomatic hypertensive urgency   Frontal headache       Visit Vitals  /82   Pulse 51   Temp 36.4 °C (97.5 °F) (Oral)   Resp 18   Ht 1.6 m (5' 3\")   Wt 99.8 kg (220 lb)   SpO2 100%   BMI 38.97 kg/m²   Smoking Status Never   BSA 2.11 m²       Medications   hydrALAZINE (Apresoline) tablet " 25 mg (25 mg oral Given 5/23/24 1311)       Plan of care discussed, patient, , bedside RN, Patient was handed off to BOBBY Gomez CNP      Final Impression      1. Asymptomatic hypertensive urgency    2. Frontal headache          DISPOSITION  Disposition:  Home depending ED workup  Patient condition is stable    Comment: Please note this report has been produced using speech recognition software and may contain errors related to that system including errors in grammar, punctuation, and spelling, as well as words and phrases that may be inappropriate.  If there are any questions or concerns please feel free to contact the dictating provider for clarification.    EDLLA Monique-HELGA Lala  05/23/24 1401

## 2024-05-23 NOTE — Clinical Note
Lory Taylor was seen and treated in our emergency department on 5/23/2024.  She may return to work on 05/24/2024.       If you have any questions or concerns, please don't hesitate to call.      Ana Gomez, APRN-CNP

## 2024-05-23 NOTE — PROGRESS NOTES
Handoff received: 5/23/2024 1400 handoff care received from Michael GILMAN at this time. Please refer to her note for initial plan of care of this patient.     Patient signed out to me pending workup results and reevaluation.  I agree with my prior providers assessment and plan of care.  Workup results were unremarkable.  See results review.  I did not need to order any additional laboratory/radiologic studies or medications for the patient during my course of care. Findings were discussed with patient and patient agreeable for plan to discharge home with primary care provider follow up in the next week for further outpatient management and reevaluation and possible medication adjustment.  She is educated to continue her already prescribed medications as directed and use Tylenol p.o. over-the-counter as needed for any pain.  Educate maintain proper rest and hydration.    Return precautions discussed and patient acknowledged understanding.  All questions and concerns answered prior to discharge.       *Please note that portions of this note may have been completed with a voice recognition program.  Efforts were made to edit the dictations but occasionally, words are mis-transcribed.

## 2024-05-24 ENCOUNTER — TELEPHONE (OUTPATIENT)
Dept: OTOLARYNGOLOGY | Facility: CLINIC | Age: 54
End: 2024-05-24
Payer: COMMERCIAL

## 2024-05-24 NOTE — TELEPHONE ENCOUNTER
Patient contacted the office today stating that she went to the ED yesterday for HTN. She is concerned that it has to do with what Dr. Murrell saw on the MRI as she has been researching on [employer]. She is wondering if this is what could be causing her episodes of increased HTN. Discussed with Dr. Murrell and patient advised to follow up with her cardiologist and PCP for her HTN. Patient to follow up with Dr. Murrell to discuss MRI results and next steps.

## 2024-05-29 ENCOUNTER — OFFICE VISIT (OUTPATIENT)
Dept: PRIMARY CARE | Facility: CLINIC | Age: 54
End: 2024-05-29
Payer: COMMERCIAL

## 2024-05-29 VITALS
WEIGHT: 216 LBS | HEART RATE: 60 BPM | SYSTOLIC BLOOD PRESSURE: 139 MMHG | DIASTOLIC BLOOD PRESSURE: 73 MMHG | OXYGEN SATURATION: 96 % | BODY MASS INDEX: 38.26 KG/M2

## 2024-05-29 DIAGNOSIS — I10 PRIMARY HYPERTENSION: Primary | ICD-10-CM

## 2024-05-29 PROCEDURE — 99213 OFFICE O/P EST LOW 20 MIN: CPT | Performed by: INTERNAL MEDICINE

## 2024-05-29 PROCEDURE — 3062F POS MACROALBUMINURIA REV: CPT | Performed by: INTERNAL MEDICINE

## 2024-05-29 PROCEDURE — 3044F HG A1C LEVEL LT 7.0%: CPT | Performed by: INTERNAL MEDICINE

## 2024-05-29 PROCEDURE — 3075F SYST BP GE 130 - 139MM HG: CPT | Performed by: INTERNAL MEDICINE

## 2024-05-29 PROCEDURE — 4010F ACE/ARB THERAPY RXD/TAKEN: CPT | Performed by: INTERNAL MEDICINE

## 2024-05-29 PROCEDURE — 1036F TOBACCO NON-USER: CPT | Performed by: INTERNAL MEDICINE

## 2024-05-29 PROCEDURE — 3078F DIAST BP <80 MM HG: CPT | Performed by: INTERNAL MEDICINE

## 2024-05-29 RX ORDER — CLONIDINE 0.1 MG/24H
PATCH, EXTENDED RELEASE TRANSDERMAL
Qty: 4 PATCH | Refills: 0 | Status: SHIPPED | OUTPATIENT
Start: 2024-05-29 | End: 2025-05-29

## 2024-05-29 NOTE — PROGRESS NOTES
Subjective   Patient ID: Lory Taylor is a 53 y.o. female who presents for Follow-up.    HPI     Patient is a 53-year-old female with past medical history of diabetes mellitus type 2 hypertension GERD systolic congestive heart failure status post stenting with reduced ejection fraction of 45 to 50% who presents for follow-up    Patient has diabetes mellitus type 2.  A1c well-controlled at 6.4.  Not currently on medications.  She sticks with a low carbohydrate diet.  She is trying to lose weight and jumps on trampolines quite frequently for exercise.  No increased thirst or increased urination.  No numbness or tingling of the extremities    Systolic congestive heart failure.  Patient is status post MI status post stenting.  She is currently on high-dose statin as well as ACE inhibitor and beta-blocker.  She has established with a cardiologist.  No shortness of breath on exertion no significant weight gain and no lower extremity swelling    She she reports ongoing pressure in the sinuses.  She is following with neurology headache team.  MRI showing empty sella and she plans on seeing optho/     She was seen in the emergency room due to elevated blood pressure readings.  Today her blood pressure is improved but not at goal.  Her blood pressure at home is significantly better          Review of Systems  Constitutional: No fever or chills  Cardiovascular: no chest pain, no palpitations and no syncope.   Respiratory: no cough, no shortness of breath during exertion and no shortness of breath at rest.   Gastrointestinal: no abdominal pain, no nausea and no vomiting.  Neuro: No Headache, no dizziness    Objective   /73   Pulse 60   Wt 98 kg (216 lb)   SpO2 96%   BMI 38.26 kg/m²     Physical Exam  Constitutional: Alert and in no acute distress. Well developed, well nourished  Head and Face: Head and face: Normal.    Cardiovascular: Heart rate and rhythm were normal, normal S1 and S2. No peripheral edema.    Pulmonary: No respiratory distress. Clear bilateral breath sounds.  Musculoskeletal: Gait and station: Normal. Muscle strength/tone: Normal.   Skin: Normal skin color and pigmentation, normal skin turgor, and no rash.    Psychiatric: Judgment and insight: Intact. Mood and affect: Normal.        Lab Results   Component Value Date    WBC 5.5 05/23/2024    HGB 13.7 05/23/2024    HCT 41.3 05/23/2024     05/23/2024    CHOL 128 03/09/2021    TRIG 34 03/09/2021    HDL 51.6 03/09/2021    ALT 25 05/23/2024    AST 22 05/23/2024     05/23/2024    K 3.7 05/23/2024     05/23/2024    CREATININE 0.78 05/23/2024    BUN 16 05/23/2024    CO2 26 05/23/2024    TSH 2.13 07/27/2023    INR 0.9 06/26/2018    HGBA1C 6.4 (H) 02/13/2024       ECG 12 lead  Normal sinus rhythm  Cannot rule out Anterior infarct (cited on or before 23-MAY-2024)  Abnormal ECG  When compared with ECG of 28-NOV-2023 19:47,  Previous ECG has undetermined rhythm, needs review  Inverted T waves have replaced nonspecific T wave abnormality in Inferior leads    See ED provider note for full interpretation and clinical correlation  Confirmed by Kristy Burgos ([id number]) on 5/23/2024 8:42:49 PM  CT head wo IV contrast  Narrative: Interpreted By:  Kathryn Giles,   STUDY:  CT HEAD WO IV CONTRAST;  5/23/2024 2:08 pm      INDICATION:  Signs/Symptoms:HTN/headache/On ASA.      COMPARISON:  None.      ACCESSION NUMBER(S):  [id number]      ORDERING CLINICIAN:  ANASTASIA CORTEZ      TECHNIQUE:  Noncontrast axial CT scan of head was performed. Angled reformats in  brain and bone windows were generated. The images were reviewed in  bone, brain, blood and soft tissue windows.      FINDINGS:  CSF Spaces: The ventricles, sulci and basal cisterns are within  normal limits. There is no extraaxial fluid collection.      Parenchyma: Mild degree of nonspecific white matter hypodensities  compatible with microangiopathy. The grey-white differentiation is  intact. There is no  mass effect or midline shift.  There is no  intracranial hemorrhage.      Calvarium: The calvarium is unremarkable.      Paranasal sinuses and mastoids: Visualized paranasal sinuses and  mastoids are predominantly clear.      Impression: No acute intracranial hemorrhage or mass effect.      MACRO:  None      Signed by: Kathryn Giles 5/23/2024 2:28 PM  Dictation workstation:   [id number]  XR chest 2 views  Narrative: STUDY:  Chest Radiographs;  5/23/24 at 1:28 PM  INDICATION:  HTN.  COMPARISON:  Chest XR 11/28/23.  ACCESSION NUMBER(S):  [id number]  ORDERING CLINICIAN:  ANASTASIA CORTEZ  TECHNIQUE:  Frontal and lateral chest.   FINDINGS:  CARDIOMEDIASTINAL SILHOUETTE:  Cardiomediastinal silhouette is normal in size and configuration.     LUNGS:  Lungs are clear.     ABDOMEN:  No remarkable upper abdominal findings.     BONES:  No acute osseous changes.  Impression: No acute process.  Signed by Jonah Elliott MD            Assessment/Plan   Problem List Items Addressed This Visit       HTN (hypertension) - Primary       Will add clonidine patch to the regimen.  Blood pressure significantly improved from the ER.  Follow-up 30 days for reevaluation

## 2024-06-04 ENCOUNTER — OFFICE VISIT (OUTPATIENT)
Dept: OTOLARYNGOLOGY | Facility: CLINIC | Age: 54
End: 2024-06-04
Payer: COMMERCIAL

## 2024-06-04 DIAGNOSIS — I10 PRIMARY HYPERTENSION: ICD-10-CM

## 2024-06-04 DIAGNOSIS — J34.2 DEVIATED NASAL SEPTUM: ICD-10-CM

## 2024-06-04 DIAGNOSIS — R09.81 NASAL CONGESTION WITH RHINORRHEA: Primary | ICD-10-CM

## 2024-06-04 DIAGNOSIS — J34.89 NASAL CONGESTION WITH RHINORRHEA: Primary | ICD-10-CM

## 2024-06-04 DIAGNOSIS — E23.6 EMPTY SELLA (MULTI): ICD-10-CM

## 2024-06-04 PROCEDURE — 99214 OFFICE O/P EST MOD 30 MIN: CPT | Performed by: OTOLARYNGOLOGY

## 2024-06-04 PROCEDURE — 3062F POS MACROALBUMINURIA REV: CPT | Performed by: OTOLARYNGOLOGY

## 2024-06-04 PROCEDURE — 1036F TOBACCO NON-USER: CPT | Performed by: OTOLARYNGOLOGY

## 2024-06-04 PROCEDURE — 4010F ACE/ARB THERAPY RXD/TAKEN: CPT | Performed by: OTOLARYNGOLOGY

## 2024-06-04 PROCEDURE — 3044F HG A1C LEVEL LT 7.0%: CPT | Performed by: OTOLARYNGOLOGY

## 2024-06-04 ASSESSMENT — PATIENT HEALTH QUESTIONNAIRE - PHQ9
1. LITTLE INTEREST OR PLEASURE IN DOING THINGS: NOT AT ALL
2. FEELING DOWN, DEPRESSED OR HOPELESS: NOT AT ALL
SUM OF ALL RESPONSES TO PHQ9 QUESTIONS 1 AND 2: 0

## 2024-06-04 NOTE — PROGRESS NOTES
Chief Complaint:  1.  Sinonasal symptoms associated with eating and drinking  2.  Rhinorrhea  3.  Nasal airway obstruction; deviated septum to the left  4.  Facial pain and pressure, headaches  5.  Obstructive sleep apnea on positive pressure  6.  Expansion of the sella turcica of unclear cause    History Of Present Illness:    Lory Taylor presents since last being seen 5/7/24.     Following that evaluation, she obtained an MRI of her brain through [facility] and I reviewed the report today.  She was seen to the emergency department May 23, 2024 for issues related to hypertension.  She was seen by her primary care provider May 29, 2024 and additional medical therapy was offered for hypertension.  Her systolic in the emergency department was greater than 200.    She and I reviewed the report of her MRI brain:  MR brain w and wo IV contrast  Order: [id number]  Narrative    EXAMINATION: MR HEAD W/+W/O 05/21/2024 12:28 PM  CLINICAL HISTORY: Surgical candidate; For volumetric surgical planning; Expanded sella turcica; MRI sella pituitary with or wo iv con for volumetric surgical planning  ASSOCIATED DIAGNOSIS: Abnormal sella turcica syndrome (HCC)  ORDERING PROVIDER: MATIAS HAZEL  TECHNOLOGISTS NOTE:  Expansion of sella turcica on outside CT (images uploaded to PACS)  COMPARISON: None  TECHNIQUE: Patient questionnaire was completed, and was reviewed by MRI personnel prior to the patient entering the scanner. Multiplanar, multisequence MR imaging of the head was performed with and without intravenous contrast.  INTRA-PROCEDURE MEDS: Gadoterate Meglumine (DOTAREM) 10 MMOL/20ML solution 20 mL Route: Intravenous Push    FINDINGS:    Pituitary Gland: Expanded empty sella turcica configuration. No sellar, parasellar, or suprasellar mass.    Infundibulum: Normal.    Cavernous Sinuses: Normal    Optic Chiasm: Normal.    Cavernous Carotid Flow Voids: Normal. No aberrant vasculature.    Sphenoid Sinus: Pneumatized to the  dorsum sella.        Other: No evidence of an acute intracranial process or prior parenchymal hemorrhage within constraints of the acquisition. Brain volume and ventricular caliber are normal. White matter is within normal limits of signal intensity. No pathologic parenchymal or leptomeningeal enhancement on included portions of the brain.    IMPRESSION:  No sellar, parasellar, or suprasellar mass.    Empty sella, which can be seen in idiopathic intracranial hypertension. Correlation with ophthalmologic examination is recommended.    Active Problems:  Patient Active Problem List   Diagnosis    Type 2 diabetes mellitus without complication (Multi)    Angina pectoris (CMS-HCC)    Coronary artery disease without angina pectoris    Hypomagnesemia    Mixed hyperlipidemia    Hypertensive heart disease without congestive heart failure    Intermittent claudication of both lower extremities due to atherosclerosis (CMS-[facility])    Morbid obesity (Multi)    Occlusion and stenosis of bilateral carotid arteries    ACC/AHA stage C systolic heart failure (Multi)    CAD S/P percutaneous coronary angioplasty    Combined form of age-related cataract, both eyes    HTN (hypertension)    Imbalance    Snoring    Tinnitus    KATEY (obstructive sleep apnea)      Past Medical History:  She has no past medical history on file.    Surgical History:  She has no past surgical history on file.     Family History:  No family history on file.    Social History:  She reports that she has never smoked. She has never been exposed to tobacco smoke. She has never used smokeless tobacco. She reports that she does not drink alcohol and does not use drugs.     Allergies:  Rash away and Penicillins    Current Meds:  Current Outpatient Medications:     aspirin 81 mg EC tablet, Take 1 tablet (81 mg) by mouth once daily., Disp: 90 tablet, Rfl: 3    atorvastatin (Lipitor) 80 mg tablet, TAKE 1 TABLET (80 MG) BY MOUTH ONCE DAILY. ---DUE FOR LABS, Disp: 30 tablet, Rfl:  0    azelastine (Astelin) 137 mcg (0.1 %) nasal spray, Administer 2 sprays into each nostril 2 times a day. Use in each nostril as directed, Disp: 30 mL, Rfl: 11    biotin 5 mg capsule, Take 1 capsule (5 mg) by mouth once daily., Disp: , Rfl:     cholecalciferol (Vitamin D-3) 25 MCG (1000 UT) capsule, Take 1 capsule (25 mcg) by mouth once daily., Disp: , Rfl:     cloNIDine (Catapres-TTS-1) 0.1 mg/24 hr patch, Apply one patch on the skin and replace every 7 days, as directed, Disp: 4 patch, Rfl: 0    diazePAM (Valium) 2 mg tablet, Take 1 tablet 30-60 minutes prior to MRI. May repeat if needed immediately prior., Disp: 2 tablet, Rfl: 0    fluticasone (Flonase) 50 mcg/actuation nasal spray, Administer 2 sprays into affected nostril(s) once daily as needed., Disp: , Rfl:     hydrALAZINE (Apresoline) 25 mg tablet, Take 1 tablet (25 mg) by mouth 2 times a day., Disp: 180 tablet, Rfl: 1    hydrOXYzine HCL (Atarax) 25 mg tablet, Take 1 tablet (25 mg) by mouth every 8 hours if needed for anxiety, allergies or itching. (Patient not taking: Reported on 4/22/2024), Disp: 12 tablet, Rfl: 0    lisinopril 30 mg tablet, Take 1 tablet (30 mg) by mouth once daily., Disp: 90 tablet, Rfl: 3    magnesium 200 mg tablet, Take 1 tablet (200 mg) by mouth once daily., Disp: , Rfl:     metoprolol succinate XL (Toprol-XL) 100 mg 24 hr tablet, Take 1 tablet (100 mg) by mouth once daily., Disp: 90 tablet, Rfl: 2    nitroglycerin (Nitrostat) 0.4 mg SL tablet, Place 1 tablet (0.4 mg) under the tongue every 5 minutes if needed for chest pain., Disp: , Rfl:     taurine 500 mg capsule, Take 500 capsules by mouth once daily., Disp: , Rfl:     triamcinolone (Nasacort) 55 mcg nasal inhaler, Administer 1 spray into each nostril 2 times a day., Disp: 16.5 g, Rfl: 11    Vitals:  Visit Vitals  Smoking Status Never      Provider Impressions:  1.  Sinonasal symptoms associated with eating and drinking  2.  Rhinorrhea  3.  Nasal airway obstruction; deviated  septum to the left  4.  Facial pain and pressure, headaches  5.  Obstructive sleep apnea on positive pressure  6.  Empty sella turcica    Discussion:  Lory LEN Khannat and I discussed her ongoing symptoms.  In regard to the findings on the MRI, we discussed empty sella and that radiology had recommended an ophthalmology evaluation.  She has been previously seen by Dr. Lr with neurology.  I will reach out to Dr. Lr to let her know that the MRI was obtained and I am hopeful that she can assist with next steps in regard to this workup.  She could also certainly see ophthalmology but I would suggest being evaluated by neurology initially.    In regard to her sinonasal symptoms, particularly her nasal drainage.  We again discussed some clear anterior nasal drainage that she has intermittently and we discussed collecting that fluid for beta-2 transferrin testing.  A container was provided and if she is able to collect fluid I recommended doing so and then dropping this off at my clinic site so I can be sent off for the testing.    If she is unable to collect the fluid, there is certainly additional options for workup of CSF leak and I am happy to discuss these with her.      I recommended repeat assessment in 6 months but we can certainly connect sooner based on the results of her beta-2 transferrin testing or if she wishes to pursue additional options for workup of CSF leak if she is unable to collect the fluid.  All questions were answered.    Between face-to-face contact, review of the medical record, and documentation I spent greater than 32 minutes on this assessment during the day of service.

## 2024-06-05 DIAGNOSIS — E78.2 MIXED HYPERLIPIDEMIA: ICD-10-CM

## 2024-06-05 DIAGNOSIS — G93.2 IIH (IDIOPATHIC INTRACRANIAL HYPERTENSION): Primary | ICD-10-CM

## 2024-06-05 RX ORDER — ACETAZOLAMIDE 125 MG/1
125 TABLET ORAL NIGHTLY
Qty: 30 TABLET | Refills: 3 | Status: SHIPPED | OUTPATIENT
Start: 2024-06-05 | End: 2025-06-05

## 2024-06-06 DIAGNOSIS — I10 PRIMARY HYPERTENSION: ICD-10-CM

## 2024-06-07 RX ORDER — ATORVASTATIN CALCIUM 80 MG/1
80 TABLET, FILM COATED ORAL DAILY
Qty: 30 TABLET | Refills: 0 | Status: SHIPPED | OUTPATIENT
Start: 2024-06-07

## 2024-06-07 RX ORDER — METOPROLOL SUCCINATE 100 MG/1
100 TABLET, EXTENDED RELEASE ORAL DAILY
Qty: 90 TABLET | Refills: 0 | Status: SHIPPED | OUTPATIENT
Start: 2024-06-07

## 2024-06-20 DIAGNOSIS — I10 PRIMARY HYPERTENSION: ICD-10-CM

## 2024-06-20 RX ORDER — CLONIDINE 0.1 MG/24H
PATCH, EXTENDED RELEASE TRANSDERMAL
Qty: 4 PATCH | Refills: 0 | Status: SHIPPED | OUTPATIENT
Start: 2024-06-20

## 2024-06-30 DIAGNOSIS — E78.2 MIXED HYPERLIPIDEMIA: ICD-10-CM

## 2024-07-01 RX ORDER — ATORVASTATIN CALCIUM 80 MG/1
80 TABLET, FILM COATED ORAL DAILY
Qty: 30 TABLET | Refills: 0 | Status: SHIPPED | OUTPATIENT
Start: 2024-07-01

## 2024-07-05 DIAGNOSIS — I10 PRIMARY HYPERTENSION: ICD-10-CM

## 2024-07-05 RX ORDER — CLONIDINE 0.1 MG/24H
PATCH, EXTENDED RELEASE TRANSDERMAL
Qty: 12 PATCH | Refills: 1 | Status: SHIPPED | OUTPATIENT
Start: 2024-07-05

## 2024-07-09 ENCOUNTER — APPOINTMENT (OUTPATIENT)
Dept: PRIMARY CARE | Facility: CLINIC | Age: 54
End: 2024-07-09
Payer: COMMERCIAL

## 2024-07-09 VITALS
HEART RATE: 62 BPM | DIASTOLIC BLOOD PRESSURE: 72 MMHG | BODY MASS INDEX: 38.44 KG/M2 | SYSTOLIC BLOOD PRESSURE: 123 MMHG | OXYGEN SATURATION: 96 % | WEIGHT: 217 LBS

## 2024-07-09 DIAGNOSIS — G89.29 CHRONIC NONINTRACTABLE HEADACHE, UNSPECIFIED HEADACHE TYPE: ICD-10-CM

## 2024-07-09 DIAGNOSIS — I10 PRIMARY HYPERTENSION: ICD-10-CM

## 2024-07-09 DIAGNOSIS — R51.9 CHRONIC NONINTRACTABLE HEADACHE, UNSPECIFIED HEADACHE TYPE: ICD-10-CM

## 2024-07-09 PROCEDURE — 99213 OFFICE O/P EST LOW 20 MIN: CPT | Performed by: INTERNAL MEDICINE

## 2024-07-09 PROCEDURE — 3044F HG A1C LEVEL LT 7.0%: CPT | Performed by: INTERNAL MEDICINE

## 2024-07-09 PROCEDURE — 3062F POS MACROALBUMINURIA REV: CPT | Performed by: INTERNAL MEDICINE

## 2024-07-09 PROCEDURE — 3074F SYST BP LT 130 MM HG: CPT | Performed by: INTERNAL MEDICINE

## 2024-07-09 PROCEDURE — 4010F ACE/ARB THERAPY RXD/TAKEN: CPT | Performed by: INTERNAL MEDICINE

## 2024-07-09 PROCEDURE — 3078F DIAST BP <80 MM HG: CPT | Performed by: INTERNAL MEDICINE

## 2024-07-09 PROCEDURE — 1036F TOBACCO NON-USER: CPT | Performed by: INTERNAL MEDICINE

## 2024-07-09 NOTE — PROGRESS NOTES
Subjective   Patient ID: Lory Taylor is a 53 y.o. female who presents for Follow-up.    HPI     Patient is a 53-year-old female with past medical history of diabetes mellitus type 2 hypertension GERD systolic congestive heart failure status post stenting with reduced ejection fraction of 45 to 50% who presents for follow-up    Patient has diabetes mellitus type 2.  A1c well-controlled at 6.4.  Not currently on medications.  She sticks with a low carbohydrate diet.  She is trying to lose weight and jumps on trampolines quite frequently for exercise.  No increased thirst or increased urination.  No numbness or tingling of the extremities    Systolic congestive heart failure.  Patient is status post MI status post stenting.  She is currently on high-dose statin as well as ACE inhibitor and beta-blocker.  She has established with a cardiologist.  No shortness of breath on exertion no significant weight gain and no lower extremity swelling    Intracranial idiopathic hypertension.  Now on acetazolamide with better control of blood pressure as well.  She overall she is doing well and has been avoiding salt and vitamin D in her diet  She is much happier with her results          Review of Systems  Constitutional: No fever or chills  Cardiovascular: no chest pain, no palpitations and no syncope.   Respiratory: no cough, no shortness of breath during exertion and no shortness of breath at rest.   Gastrointestinal: no abdominal pain, no nausea and no vomiting.  Neuro: No Headache, no dizziness    Objective   /72   Pulse 62   Wt 98.4 kg (217 lb)   SpO2 96%   BMI 38.44 kg/m²     Physical Exam  Constitutional: Alert and in no acute distress. Well developed, well nourished  Head and Face: Head and face: Normal.    Cardiovascular: Heart rate and rhythm were normal, normal S1 and S2. No peripheral edema.   Pulmonary: No respiratory distress. Clear bilateral breath sounds.  Musculoskeletal: Gait and station: Normal.  Muscle strength/tone: Normal.   Skin: Normal skin color and pigmentation, normal skin turgor, and no rash.    Psychiatric: Judgment and insight: Intact. Mood and affect: Normal.        Lab Results   Component Value Date    WBC 5.5 05/23/2024    HGB 13.7 05/23/2024    HCT 41.3 05/23/2024     05/23/2024    CHOL 128 03/09/2021    TRIG 34 03/09/2021    HDL 51.6 03/09/2021    ALT 25 05/23/2024    AST 22 05/23/2024     05/23/2024    K 3.7 05/23/2024     05/23/2024    CREATININE 0.78 05/23/2024    BUN 16 05/23/2024    CO2 26 05/23/2024    TSH 2.13 07/27/2023    INR 0.9 06/26/2018    HGBA1C 6.4 (H) 02/13/2024       ECG 12 lead  Normal sinus rhythm  Cannot rule out Anterior infarct (cited on or before 23-MAY-2024)  Abnormal ECG  When compared with ECG of 28-NOV-2023 19:47,  Previous ECG has undetermined rhythm, needs review  Inverted T waves have replaced nonspecific T wave abnormality in Inferior leads    See ED provider note for full interpretation and clinical correlation  Confirmed by Kristy Burgos ([id number]) on 5/23/2024 8:42:49 PM  CT head wo IV contrast  Narrative: Interpreted By:  Kathryn Giles,   STUDY:  CT HEAD WO IV CONTRAST;  5/23/2024 2:08 pm      INDICATION:  Signs/Symptoms:HTN/headache/On ASA.      COMPARISON:  None.      ACCESSION NUMBER(S):  [id number]      ORDERING CLINICIAN:  ANASTASIA CORTEZ      TECHNIQUE:  Noncontrast axial CT scan of head was performed. Angled reformats in  brain and bone windows were generated. The images were reviewed in  bone, brain, blood and soft tissue windows.      FINDINGS:  CSF Spaces: The ventricles, sulci and basal cisterns are within  normal limits. There is no extraaxial fluid collection.      Parenchyma: Mild degree of nonspecific white matter hypodensities  compatible with microangiopathy. The grey-white differentiation is  intact. There is no mass effect or midline shift.  There is no  intracranial hemorrhage.      Calvarium: The calvarium is  unremarkable.      Paranasal sinuses and mastoids: Visualized paranasal sinuses and  mastoids are predominantly clear.      Impression: No acute intracranial hemorrhage or mass effect.      MACRO:  None      Signed by: Kathryn Giles 5/23/2024 2:28 PM  Dictation workstation:   [id number]  XR chest 2 views  Narrative: STUDY:  Chest Radiographs;  5/23/24 at 1:28 PM  INDICATION:  HTN.  COMPARISON:  Chest XR 11/28/23.  ACCESSION NUMBER(S):  [id number]  ORDERING CLINICIAN:  ANASTASIA CORTEZ  TECHNIQUE:  Frontal and lateral chest.   FINDINGS:  CARDIOMEDIASTINAL SILHOUETTE:  Cardiomediastinal silhouette is normal in size and configuration.     LUNGS:  Lungs are clear.     ABDOMEN:  No remarkable upper abdominal findings.     BONES:  No acute osseous changes.  Impression: No acute process.  Signed by Jonah Elliott MD            Assessment/Plan   Problem List Items Addressed This Visit       HTN (hypertension)     Better controlled with the addition of the acetazolamide for intracranial hypertension treatment.  No need to continue the clonidine.  She has not even started it.  Follow-up 3 months for A1c recheck         Relevant Orders    Follow Up In [facility] - PCP - Established     Other Visit Diagnoses       Chronic nonintractable headache, unspecified headache type

## 2024-07-09 NOTE — ASSESSMENT & PLAN NOTE
Better controlled with the addition of the acetazolamide for intracranial hypertension treatment.  No need to continue the clonidine.  She has not even started it.  Follow-up 3 months for A1c recheck

## 2024-07-12 ENCOUNTER — OFFICE VISIT (OUTPATIENT)
Dept: NEUROLOGY | Facility: HOSPITAL | Age: 54
End: 2024-07-12
Payer: COMMERCIAL

## 2024-07-12 VITALS
WEIGHT: 217 LBS | DIASTOLIC BLOOD PRESSURE: 69 MMHG | TEMPERATURE: 97.1 F | RESPIRATION RATE: 18 BRPM | HEIGHT: 63 IN | BODY MASS INDEX: 38.45 KG/M2 | SYSTOLIC BLOOD PRESSURE: 133 MMHG | HEART RATE: 69 BPM

## 2024-07-12 DIAGNOSIS — G93.2 IIH (IDIOPATHIC INTRACRANIAL HYPERTENSION): Primary | ICD-10-CM

## 2024-07-12 PROCEDURE — 1036F TOBACCO NON-USER: CPT | Performed by: PSYCHIATRY & NEUROLOGY

## 2024-07-12 PROCEDURE — 3075F SYST BP GE 130 - 139MM HG: CPT | Performed by: PSYCHIATRY & NEUROLOGY

## 2024-07-12 PROCEDURE — 3078F DIAST BP <80 MM HG: CPT | Performed by: PSYCHIATRY & NEUROLOGY

## 2024-07-12 PROCEDURE — 99215 OFFICE O/P EST HI 40 MIN: CPT | Performed by: PSYCHIATRY & NEUROLOGY

## 2024-07-12 PROCEDURE — 3044F HG A1C LEVEL LT 7.0%: CPT | Performed by: PSYCHIATRY & NEUROLOGY

## 2024-07-12 PROCEDURE — 4010F ACE/ARB THERAPY RXD/TAKEN: CPT | Performed by: PSYCHIATRY & NEUROLOGY

## 2024-07-12 PROCEDURE — 3062F POS MACROALBUMINURIA REV: CPT | Performed by: PSYCHIATRY & NEUROLOGY

## 2024-07-12 RX ORDER — ALPRAZOLAM 0.5 MG/1
0.5 TABLET ORAL NIGHTLY PRN
Qty: 7 TABLET | Refills: 0 | Status: SHIPPED | OUTPATIENT
Start: 2024-07-12 | End: 2024-07-19

## 2024-07-12 ASSESSMENT — PAIN SCALES - GENERAL: PAINLEVEL: 0-NO PAIN

## 2024-07-12 NOTE — PROGRESS NOTES
"Subjective     Chief Complaint: Headache    Lory Taylor is a 53 y.o. year old female with HTN, DM2, IIH who presents for follow up of idiopathic intracranial hypertension    HPI  Interval history:  Lory noted improvement in her blood pressure and headache after she changed her diet to limit her vitamin A and tyramine rich foods (for example, reduced spinach, switched to cauliflower) based on her independent search for diet changes.     She started taking the diamox after this diet change, and notes both the diet and medication have reduced her headache frequency and intensity. She reports a \"very very slight\" pressure in her sinuses occasionally (usually when eating/drinking), and denies nasal drainage. She reports that she can tell if she is having drainage (rhinorrhea) because she gets a bad taste, and she only rarely gets that taste now, usually after bending down (though not immediately).     She denies any side effects of the diamox. She denies any light-headedness or changes in her vision, but endorses continued dark spot in her left eye. She has been taking it regularly and has not missed doses. He is monitoring her blood pressure.    Follows with ENT - no CSF leak visualized on scope. Known L deviated septum.    MRV was not acquired, for unclear reasons. Has not yet seen ophtho    Her only concern today is losing weight, which she is having difficulty doing, is planning to see a nutritionist in coming month.    Headache history:  Lory started getting headaches in 2021 at age 50.  These developed after she began using CPAP for her KATEY improved when she loosened the mask. Generally, headaches last about  1-3  hours in duration. Patient has 16/30 headache days per month. The headaches are usually  constant pressue around eyes and nose , generally symmetric and occasaionally involving the back of her head. The patient rates her most severe headaches a 8 in intensity. Associated  symptoms included nasal " "drainage of clear fluid and bad taste in her throat . Laying flat was helpful.    Work attendance or other daily activities are affected by the headaches.    Current Acute Headache Treatment None   Current Preventative Headache Treatment Diamox 125 QHS   Previous Acute Headache Treatment  None   Previous Preventative Headache Treatment None       ROS: As per HPI, otherwise all other systems have been reviewed are negative for complaint.     No past medical history on file.  No past surgical history on file.  No family history on file.  Social History     Tobacco Use    Smoking status: Former     Types: Cigarettes     Passive exposure: Never    Smokeless tobacco: Never   Substance Use Topics    Alcohol use: Never        Objective   /69   Pulse 69   Temp 36.2 °C (97.1 °F)   Resp 18   Ht 1.6 m (5' 3\")   Wt 98.4 kg (217 lb)   BMI 38.44 kg/m²     Neuro Exam:  Cardiac Exam: No apparent edema of b/l lower extremities  Neurological Exam:  MENTAL STATUS:   General Appearance: No distress, alert, interactive, and cooperative. Very pleasant, circumstantial thought process. Orientation was normal to time, place and person. Recent and remote memory was intact.     OPHTHALMOSCOPIC:   The ophthalmoscopic exam was limited. The fundi were incompletely visualized with normal disc margins, clear vessels and vascular pulsations. No disc edema. The cup/disk ratio was not enlarged. No hemorrhages or exudates were present in the posterior segments that were visualized.     CRANIAL NERVES:   CN 2         Visual fields full to confrontation.   CN 3, 4, 6   Pupils round, 4 mm in diameter, equally reactive to light, to 2mm. Lids symmetric; no ptosis. EOMs normal alignment, full range with normal saccades, pursuit and convergence.   No nystagmus.   CN 5   Facial sensation intact bilaterally.   CN 7   Normal and symmetric facial strength. Nasolabial folds symmetric.   CN 8   Hearing intact to conversation and finger rub.  CN 9, 10 "   Palate elevates symmetrically.  CN 11   Normal strength of shoulder shrug and neck turning.   CN 12   Tongue midline, with normal bulk and strength; no fasciculations.     MOTOR:   Muscle bulk and tone were normal in both upper and lower extremities.   No pronator drift bilaterally.  No fasciculations, tremor or other abnormal movements evident with the patient examined clothed.    STRENGTH:  R  L  Deltoid            5          5  Biceps  5 5  Triceps  5 5    Hip flexion 5 5  Knee Flex 5 5  Knee Ex 5 5      SENSORY:   In both upper and lower extremities, sensation was intact to light touch.    COORDINATION:   In both upper extremities, finger-nose-finger was intact without dysmetria or overshoot.     GAIT:   Station was stable with a normal base. Gait was stable with a normal arm swing and speed. No ataxia, shuffling, steppage or waddling was present. No circumduction was present.    Results  Admission on 05/23/2024, Discharged on 05/23/2024   Component Date Value    WBC 05/23/2024 5.5     nRBC 05/23/2024 0.0     RBC 05/23/2024 4.44     Hemoglobin 05/23/2024 13.7     Hematocrit 05/23/2024 41.3     MCV 05/23/2024 93     MCH 05/23/2024 30.9     MCHC 05/23/2024 33.2     RDW 05/23/2024 12.0     Platelets 05/23/2024 165     Neutrophils % 05/23/2024 63.9     Immature Granulocytes %,* 05/23/2024 0.2     Lymphocytes % 05/23/2024 25.3     Monocytes % 05/23/2024 8.3     Eosinophils % 05/23/2024 1.6     Basophils % 05/23/2024 0.7     Neutrophils Absolute 05/23/2024 3.53     Immature Granulocytes Ab* 05/23/2024 0.01     Lymphocytes Absolute 05/23/2024 1.40     Monocytes Absolute 05/23/2024 0.46     Eosinophils Absolute 05/23/2024 0.09     Basophils Absolute 05/23/2024 0.04     Magnesium 05/23/2024 1.68     Glucose 05/23/2024 145 (H)     Sodium 05/23/2024 138     Potassium 05/23/2024 3.7     Chloride 05/23/2024 104     Bicarbonate 05/23/2024 26     Anion Gap 05/23/2024 12     Urea Nitrogen 05/23/2024 16     Creatinine  05/23/2024 0.78     eGFR 05/23/2024 >90     Calcium 05/23/2024 8.9     Albumin 05/23/2024 3.6     Alkaline Phosphatase 05/23/2024 62     Total Protein 05/23/2024 6.8     AST 05/23/2024 22     Bilirubin, Total 05/23/2024 0.4     ALT 05/23/2024 25     Ventricular Rate 05/23/2024 66     Atrial Rate 05/23/2024 66     CA Interval 05/23/2024 192     QRS Duration 05/23/2024 88     QT Interval 05/23/2024 396     QTC Calculation(Bazett) 05/23/2024 415     P Axis 05/23/2024 33     R [place] 05/23/2024 29     T [place] 05/23/2024 -11     QRS Count 05/23/2024 10     Q Onset 05/23/2024 215     P Onset 05/23/2024 119     P Offset 05/23/2024 184     T Offset 05/23/2024 413     QTC Fredericia 05/23/2024 409     Troponin I, High Sensiti* 05/23/2024 4     Troponin I, High Sensiti* 05/23/2024 4      MR Brain with and without IV Contrast: MR brain w and wo IV contrast 05/21/2024    Narrative  EXAMINATION: MR HEAD W/+W/O 05/21/2024 12:28 PM  CLINICAL HISTORY: Surgical candidate; For volumetric surgical planning; Expanded sella turcica; MRI sella pituitary with or wo iv con for volumetric surgical planning  ASSOCIATED DIAGNOSIS: Abnormal sella turcica syndrome (HCC)  ORDERING PROVIDER: MATIAS HAZEL  TECHNOLOGISTS NOTE:  Expansion of sella turcica on outside CT (images uploaded to PACS)  COMPARISON: None  TECHNIQUE: Patient questionnaire was completed, and was reviewed by MRI personnel prior to the patient entering the scanner. Multiplanar, multisequence MR imaging of the head was performed with and without intravenous contrast.  INTRA-PROCEDURE MEDS: Gadoterate Meglumine (DOTAREM) 10 MMOL/20ML solution 20 mL Route: Intravenous Push    FINDINGS:    Pituitary Gland: Expanded empty sella turcica configuration. No sellar, parasellar, or suprasellar mass.    Infundibulum: Normal.    Cavernous Sinuses: Normal    Optic Chiasm: Normal.    Cavernous Carotid Flow Voids: Normal. No aberrant vasculature.    Sphenoid Sinus: Pneumatized to the dorsum  sella.      Other: No evidence of an acute intracranial process or prior parenchymal hemorrhage within constraints of the acquisition. Brain volume and ventricular caliber are normal. White matter is within normal limits of signal intensity. No pathologic parenchymal or leptomeningeal enhancement on included portions of the brain.    IMPRESSION:  No sellar, parasellar, or suprasellar mass.    Empty sella, which can be seen in idiopathic intracranial hypertension. Correlation with ophthalmologic examination is recommended.        MACRO: None                   Assessment/Plan     Lory is a 52yo F with HTN, CAD s/p stenting, DM2, sinus drainage and probable IIH who presents for followup of IIH.    Her nasal drainage associated with high blood pressure and headache in combination with her empty sella seen on MRI remains concerning primarily for IIH with possible CSF leak. Improvement of her nasal drainage symptoms with initiation of diamox somewhat supports this, and ENT scope did not see any large leak, which is reassuring.     In order to determine prognosis of improvement, will need MRV to determine need for future venous sinus stenting. At this time symptoms are not severe enough to stent but would want to establish if we do see venous stenosis.    Needs followup with ophtho to track her vision. Referral to Dr. Easton placed.    Tolerating current dose of diamox, will continue.    Plan:  - MRV brain with contrast (not previously aquired) to determine if there are any venous stenosis that may require future stenting   -  Xanax .5 for periprocedural anxiety  - Referral to neuro-opthalmology placed (Dr. Easton)  - continue diamox 125 at bedtime  - agree with nutritionist followup to help with weight strategies  - RTC after MRV and optho eval    Panfilo Calhoun MD  PGY4 neurology    Patient examined and discussed with attending Dr. Lr who agrees with the above assessment and plan.    I personally spent 45 minutes  today, exclusive of procedures, providing care for this patient, including preparation, face to face time, documentation and other services such as review of medical records, diagnostic result, patient education, counseling, coordination of care as specified in the encounter.     I saw and evaluated the patient. I personally obtained the key and critical portions of the history and physical exam or was physically present for key and critical portions performed by the resident/fellow. I reviewed the resident/fellow's documentation and discussed the patient with the resident/fellow. I agree with the resident/fellow's medical decision making as documented in the note.    Priti Lr MD

## 2024-07-16 ENCOUNTER — APPOINTMENT (OUTPATIENT)
Dept: OTOLARYNGOLOGY | Facility: CLINIC | Age: 54
End: 2024-07-16
Payer: COMMERCIAL

## 2024-07-29 ENCOUNTER — HOSPITAL ENCOUNTER (OUTPATIENT)
Dept: RADIOLOGY | Facility: CLINIC | Age: 54
Discharge: HOME | End: 2024-07-29
Payer: COMMERCIAL

## 2024-07-29 DIAGNOSIS — G93.2 IIH (IDIOPATHIC INTRACRANIAL HYPERTENSION): ICD-10-CM

## 2024-07-29 PROCEDURE — A9575 INJ GADOTERATE MEGLUMI 0.1ML: HCPCS | Performed by: PSYCHIATRY & NEUROLOGY

## 2024-07-29 PROCEDURE — 2550000001 HC RX 255 CONTRASTS: Performed by: PSYCHIATRY & NEUROLOGY

## 2024-07-29 PROCEDURE — 70546 MR ANGIOGRAPH HEAD W/O&W/DYE: CPT

## 2024-07-29 RX ORDER — ACETAZOLAMIDE 250 MG/1
250 TABLET ORAL NIGHTLY
Qty: 30 TABLET | Refills: 3 | Status: SHIPPED | OUTPATIENT
Start: 2024-07-29 | End: 2025-07-29

## 2024-07-29 RX ORDER — GADOTERATE MEGLUMINE 376.9 MG/ML
20 INJECTION INTRAVENOUS
Status: COMPLETED | OUTPATIENT
Start: 2024-07-29 | End: 2024-07-29

## 2024-07-29 NOTE — RESULT ENCOUNTER NOTE
Please call the patient regarding her abnormal result. I called to leave a message about her MRI. She does have saggital sinus thrombosis  We dont plan to act on this info unless the diamox does not work

## 2024-07-30 ENCOUNTER — APPOINTMENT (OUTPATIENT)
Dept: CARDIOLOGY | Facility: CLINIC | Age: 54
End: 2024-07-30
Payer: COMMERCIAL

## 2024-08-01 ENCOUNTER — TELEPHONE (OUTPATIENT)
Dept: NEUROLOGY | Facility: CLINIC | Age: 54
End: 2024-08-01
Payer: COMMERCIAL

## 2024-08-01 NOTE — TELEPHONE ENCOUNTER
----- Message from Priti Lr sent at 7/29/2024  2:02 PM EDT -----  Please call the patient regarding her abnormal result. I called to leave a message about her MRI. She does have saggital sinus thrombosis  We dont plan to act on this info unless the diamox does not work

## 2024-08-07 ENCOUNTER — TELEPHONE (OUTPATIENT)
Dept: NEUROLOGY | Facility: CLINIC | Age: 54
End: 2024-08-07
Payer: COMMERCIAL

## 2024-08-21 ENCOUNTER — TELEPHONE (OUTPATIENT)
Dept: NEUROLOGY | Facility: CLINIC | Age: 54
End: 2024-08-21
Payer: COMMERCIAL

## 2024-08-21 NOTE — TELEPHONE ENCOUNTER
Lory calling with some questions:  1. about a sleep test coming up and whether the sleep apnea and IIH correlate at all or can impact each other.   2. Does the acetazolamide actually impact the narrowing of the vessel that was seen - I explained what I knew of this and that it does not impact the narrowing but can help flow.   3. Will the narrowing worsen and what will/can be done about it.     I set her up with a follow up early October after her appointment [facility]-neuro ophth.

## 2024-08-21 NOTE — TELEPHONE ENCOUNTER
Called Lory with Dr. Lr's response.   Also sent response via my chart message so she could share with her spouse.

## 2024-09-10 ENCOUNTER — APPOINTMENT (OUTPATIENT)
Dept: CARDIOLOGY | Facility: CLINIC | Age: 54
End: 2024-09-10
Payer: COMMERCIAL

## 2024-09-10 VITALS
BODY MASS INDEX: 38.09 KG/M2 | HEIGHT: 63 IN | WEIGHT: 215 LBS | SYSTOLIC BLOOD PRESSURE: 146 MMHG | DIASTOLIC BLOOD PRESSURE: 78 MMHG | HEART RATE: 52 BPM | OXYGEN SATURATION: 99 %

## 2024-09-10 DIAGNOSIS — G47.33 OSA (OBSTRUCTIVE SLEEP APNEA): ICD-10-CM

## 2024-09-10 DIAGNOSIS — H93.A9 PULSATILE TINNITUS: ICD-10-CM

## 2024-09-10 DIAGNOSIS — Z98.61 CAD S/P PERCUTANEOUS CORONARY ANGIOPLASTY: Primary | ICD-10-CM

## 2024-09-10 DIAGNOSIS — I25.10 CORONARY ARTERY DISEASE INVOLVING NATIVE CORONARY ARTERY OF NATIVE HEART WITHOUT ANGINA PECTORIS: ICD-10-CM

## 2024-09-10 DIAGNOSIS — I65.23 OCCLUSION AND STENOSIS OF BILATERAL CAROTID ARTERIES: ICD-10-CM

## 2024-09-10 DIAGNOSIS — I50.20 ACC/AHA STAGE C SYSTOLIC HEART FAILURE (MULTI): ICD-10-CM

## 2024-09-10 DIAGNOSIS — I70.213 INTERMITTENT CLAUDICATION OF BOTH LOWER EXTREMITIES DUE TO ATHEROSCLEROSIS (CMS-HCC): ICD-10-CM

## 2024-09-10 DIAGNOSIS — E78.2 MIXED HYPERLIPIDEMIA: ICD-10-CM

## 2024-09-10 DIAGNOSIS — R42 DIZZINESS: ICD-10-CM

## 2024-09-10 DIAGNOSIS — I10 PRIMARY HYPERTENSION: ICD-10-CM

## 2024-09-10 DIAGNOSIS — I25.10 CAD S/P PERCUTANEOUS CORONARY ANGIOPLASTY: Primary | ICD-10-CM

## 2024-09-10 DIAGNOSIS — I11.9 HYPERTENSIVE HEART DISEASE WITHOUT CONGESTIVE HEART FAILURE: ICD-10-CM

## 2024-09-10 PROCEDURE — 3062F POS MACROALBUMINURIA REV: CPT | Performed by: STUDENT IN AN ORGANIZED HEALTH CARE EDUCATION/TRAINING PROGRAM

## 2024-09-10 PROCEDURE — 3044F HG A1C LEVEL LT 7.0%: CPT | Performed by: STUDENT IN AN ORGANIZED HEALTH CARE EDUCATION/TRAINING PROGRAM

## 2024-09-10 PROCEDURE — 3008F BODY MASS INDEX DOCD: CPT | Performed by: STUDENT IN AN ORGANIZED HEALTH CARE EDUCATION/TRAINING PROGRAM

## 2024-09-10 PROCEDURE — 3078F DIAST BP <80 MM HG: CPT | Performed by: STUDENT IN AN ORGANIZED HEALTH CARE EDUCATION/TRAINING PROGRAM

## 2024-09-10 PROCEDURE — 99215 OFFICE O/P EST HI 40 MIN: CPT | Performed by: STUDENT IN AN ORGANIZED HEALTH CARE EDUCATION/TRAINING PROGRAM

## 2024-09-10 PROCEDURE — 4010F ACE/ARB THERAPY RXD/TAKEN: CPT | Performed by: STUDENT IN AN ORGANIZED HEALTH CARE EDUCATION/TRAINING PROGRAM

## 2024-09-10 PROCEDURE — 3077F SYST BP >= 140 MM HG: CPT | Performed by: STUDENT IN AN ORGANIZED HEALTH CARE EDUCATION/TRAINING PROGRAM

## 2024-09-10 PROCEDURE — 1036F TOBACCO NON-USER: CPT | Performed by: STUDENT IN AN ORGANIZED HEALTH CARE EDUCATION/TRAINING PROGRAM

## 2024-09-10 NOTE — PROGRESS NOTES
Follow-up visit    HPI:    Lory Taylor is a 54 y.o. female with pertinent history of sleep apnea, tinnitus, type 2 diabetes, Intracranial idiopathic hypertension obstructive coronary artery disease status post PCI to LAD June 2018 with history of recovered ischemic cardiomyopathy, history acute on chronic diastolic heart failure, preserved ejection fraction with impaired relaxation, normalization of prior regional wall motion abnormalities, mild right ventricular and right atrial dilation, mild aortic regurgitation echo performed 7/20/2023 no obstructive disease on carotid Dopplers performed 2/16/2024 presents to cardiology clinic for follow up.     She is doing relatively well. Dyspnea on exertion is stable.  She has minimal episodes of chest pressure.  She does note that her blood pressure occasionally spikes but is normally well-controlled.  No exacerbating or relieving factors.   Pt denies orthopnea, and paroxysmal nocturnal dyspnea.  Pt denies worsening lower extremity edema.  Pt denies palpitations or syncope.  No recent falls.  No fever or chills.  No cough.  No change in bowel or bladder habits.  No sick contacts.  No recent travel.    12 point review of systems including (Constitutional, Eyes, ENMT, Respiratory, Cardiac, Gastrointestinal, Neurological, Psychiatric, and Hematologic) was performed and is otherwise negative.    Past medical history:  As above.    Medications were reviewed.    Allergies were reviewed.    Family history:  No sudden cardiac death or premature coronary artery disease.     Social history reviewed:   reports that she has quit smoking. Her smoking use included cigarettes. She has never been exposed to tobacco smoke. She has never used smokeless tobacco. She reports that she does not drink alcohol and does not use drugs.     Allergies reviewed: Rash away and Penicillins     Medications reviewed:   Current Outpatient Medications   Medication Instructions    acetaZOLAMIDE (DIAMOX)  250 mg, oral, Nightly    ALPRAZolam (XANAX) 0.5 mg, oral, Nightly PRN    aspirin 81 mg, oral, Daily    atorvastatin (LIPITOR) 80 mg, oral, Daily    azelastine (Astelin) 137 mcg (0.1 %) nasal spray 2 sprays, Each Nostril, 2 times daily, Use in each nostril as directed    biotin 5 mg, oral, Daily    cholecalciferol (VITAMIN D-3) 25 mcg, oral, Daily    dihydroberberine (BERBERINE ES-5 ORAL) oral    fluticasone (Flonase) 50 mcg/actuation nasal spray 2 sprays, nasal, Daily PRN    hydrALAZINE (APRESOLINE) 25 mg, oral, 2 times daily    lisinopril 30 mg, oral, Daily    magnesium 200 mg tablet 1 tablet, oral, Daily    metoprolol succinate XL (TOPROL-XL) 100 mg, oral, Daily    nitroglycerin (NITROSTAT) 0.4 mg, sublingual, Every 5 min PRN    taurine 500 mg capsule 500 capsules, oral, Daily    triamcinolone (Nasacort) 55 mcg nasal inhaler 1 spray, Each Nostril, 2 times daily        Vitals reviewed: Visit Vitals  /78   Pulse 52         Physical Exam:   General:  Patient is awake, alert, and oriented.  Patient is in no acute distress.  HEENT:  Pupils equal and reactive.  Normocephalic.  Moist mucosa.    Neck:  No thyromegaly.  Normal Jugular Venous Pressure.  Cardiovascular:  Regular rate and rhythm.  Normal S1 and S2.  1/6 MATTHEW.  Pulmonary:  Clear to auscultation bilaterally.  Abdomen:  Soft. Non-tender.   Non-distended.  Positive bowel sounds.  Lower Extremities:  2+ pedal pulses. No LE edema.  Neurologic:  Cranial nerves intact.  No focal deficit.   Skin: Skin warm and dry, normal skin turgor.   Psychiatric: Normal affect.    Last Labs:  CBC -      Lab Results   Component Value Date    WBC 5.5 05/23/2024    HGB 13.7 05/23/2024    HCT 41.3 05/23/2024     05/23/2024        CMP-  Lab Results   Component Value Date    GLUCOSE 145 (H) 05/23/2024     05/23/2024    K 3.7 05/23/2024     05/23/2024    CO2 26 05/23/2024    ANIONGAP 12 05/23/2024    BUN 16 05/23/2024    CREATININE 0.78 05/23/2024    EGFR >90  05/23/2024    CALCIUM 8.9 05/23/2024    PHOS 2.6 06/28/2018    PROT 6.8 05/23/2024    ALBUMIN 3.6 05/23/2024    AST 22 05/23/2024    ALT 25 05/23/2024    ALKPHOS 62 05/23/2024    BILITOT 0.4 05/23/2024        LIPIDS-  Lab Results   Component Value Date    CHOL 128 03/09/2021    TRIG 34 03/09/2021    HDL 51.6 03/09/2021    CHHDL 2.5 03/09/2021    VLDL 7 03/09/2021        OTHERS-  Lab Results   Component Value Date    HGBA1C 6.4 (H) 02/13/2024    BNP 30 11/28/2023        I personally reviewed the patient's recent vitals, labs, medications, orders, EKGs, pertinent cardiac imaging/ echocardiography and ischemic evaluations including stress testing/ cardiac catheterization.    Assessment and Plan:    Lory Taylor is a 54 y.o. female with pertinent history of sleep apnea, tinnitus, type 2 diabetes, Intracranial idiopathic hypertension obstructive coronary artery disease status post PCI to LAD June 2018 with history of recovered ischemic cardiomyopathy, history acute on chronic diastolic heart failure, preserved ejection fraction with impaired relaxation, normalization of prior regional wall motion abnormalities, mild right ventricular and right atrial dilation, mild aortic regurgitation echo performed 7/20/2023 no obstructive disease on carotid Dopplers performed 2/16/2024 presents to cardiology clinic for follow up.  She is doing relatively well. Dyspnea on exertion is stable.  She has minimal episodes of chest pressure.  She does note that her blood pressure occasionally spikes but is normally well-controlled.  She did have an emergency room visit in May for uncontrolled hypertension.    We will plan to change lisinopril from 30 mg once daily to 20 mg twice daily.    We also discussed that if you have a blood pressure spike you can take an additional dose of hydralazine to help control it.    Please continue current cardiac medications including aspirin 81 mg, atorvastatin 80 mg daily, hydralazine 25 mg twice  daily,metoprolol succinate 100 mg daily, sublingual nitroglycerin as needed.    We will obtain a transthoracic echocardiogram for structural evaluation including ejection fraction, assessment of regional wall motion abnormalities or valvular disease, and further evaluation of hemodynamics prior to your follow-up visit.    Please followup with me in Cardiology clinic within the next 8 months.  Please return to clinic sooner or seek emergent care if your symptoms reoccur or worsen.    Thank you for allowing me to participate in their care.  Please feel free to call me with any further questions or concerns.        Jonah Flores MD, FACC, SUZIE HAGER  Division of Cardiovascular Medicine  Medical Director, [facility] [place]  Clinical , [facility] [employer]  [email or web address]   Office:  [phone]

## 2024-09-10 NOTE — PATIENT INSTRUCTIONS
We will plan to change lisinopril from 30 mg once daily to 20 mg twice daily.    We also discussed that if you have a blood pressure spike you can take an additional dose of hydralazine to help control it.    Please continue current cardiac medications including aspirin 81 mg, atorvastatin 80 mg daily, hydralazine 25 mg twice daily,metoprolol succinate 100 mg daily, sublingual nitroglycerin as needed.    We will obtain a transthoracic echocardiogram for structural evaluation including ejection fraction, assessment of regional wall motion abnormalities or valvular disease, and further evaluation of hemodynamics prior to your follow-up visit.    Please followup with me in Cardiology clinic within the next 8 months.  Please return to clinic sooner or seek emergent care if your symptoms reoccur or worsen.

## 2024-09-16 ENCOUNTER — OFFICE VISIT (OUTPATIENT)
Dept: PRIMARY CARE | Facility: CLINIC | Age: 54
End: 2024-09-16
Payer: COMMERCIAL

## 2024-09-16 VITALS
HEART RATE: 49 BPM | TEMPERATURE: 97.7 F | OXYGEN SATURATION: 100 % | BODY MASS INDEX: 40.52 KG/M2 | SYSTOLIC BLOOD PRESSURE: 127 MMHG | DIASTOLIC BLOOD PRESSURE: 79 MMHG | HEIGHT: 61 IN | RESPIRATION RATE: 16 BRPM | WEIGHT: 214.6 LBS

## 2024-09-16 DIAGNOSIS — I10 PRIMARY HYPERTENSION: ICD-10-CM

## 2024-09-16 DIAGNOSIS — Z12.31 ENCOUNTER FOR SCREENING MAMMOGRAM FOR MALIGNANT NEOPLASM OF BREAST: ICD-10-CM

## 2024-09-16 DIAGNOSIS — R73.03 PREDIABETES: Primary | ICD-10-CM

## 2024-09-16 DIAGNOSIS — E78.2 MIXED HYPERLIPIDEMIA: ICD-10-CM

## 2024-09-16 DIAGNOSIS — I25.10 CORONARY ARTERY DISEASE INVOLVING NATIVE CORONARY ARTERY OF NATIVE HEART WITHOUT ANGINA PECTORIS: ICD-10-CM

## 2024-09-16 LAB
CHOLEST SERPL-MCNC: 166 MG/DL (ref 0–199)
CHOLESTEROL/HDL RATIO: 3
EST. AVERAGE GLUCOSE BLD GHB EST-MCNC: 137 MG/DL
HBA1C MFR BLD: 6.4 %
HDLC SERPL-MCNC: 54.7 MG/DL
LDLC SERPL CALC-MCNC: 103 MG/DL
NON HDL CHOLESTEROL: 111 MG/DL (ref 0–149)
TRIGL SERPL-MCNC: 43 MG/DL (ref 0–149)
VLDL: 9 MG/DL (ref 0–40)

## 2024-09-16 PROCEDURE — 4010F ACE/ARB THERAPY RXD/TAKEN: CPT | Performed by: INTERNAL MEDICINE

## 2024-09-16 PROCEDURE — 83036 HEMOGLOBIN GLYCOSYLATED A1C: CPT | Performed by: INTERNAL MEDICINE

## 2024-09-16 PROCEDURE — 99204 OFFICE O/P NEW MOD 45 MIN: CPT | Performed by: INTERNAL MEDICINE

## 2024-09-16 PROCEDURE — 99214 OFFICE O/P EST MOD 30 MIN: CPT | Performed by: INTERNAL MEDICINE

## 2024-09-16 PROCEDURE — 3008F BODY MASS INDEX DOCD: CPT | Performed by: INTERNAL MEDICINE

## 2024-09-16 PROCEDURE — 1036F TOBACCO NON-USER: CPT | Performed by: INTERNAL MEDICINE

## 2024-09-16 PROCEDURE — 3074F SYST BP LT 130 MM HG: CPT | Performed by: INTERNAL MEDICINE

## 2024-09-16 PROCEDURE — 3078F DIAST BP <80 MM HG: CPT | Performed by: INTERNAL MEDICINE

## 2024-09-16 PROCEDURE — 3062F POS MACROALBUMINURIA REV: CPT | Performed by: INTERNAL MEDICINE

## 2024-09-16 PROCEDURE — 83718 ASSAY OF LIPOPROTEIN: CPT | Performed by: INTERNAL MEDICINE

## 2024-09-16 PROCEDURE — 36415 COLL VENOUS BLD VENIPUNCTURE: CPT | Performed by: INTERNAL MEDICINE

## 2024-09-16 PROCEDURE — 3044F HG A1C LEVEL LT 7.0%: CPT | Performed by: INTERNAL MEDICINE

## 2024-09-16 RX ORDER — LISINOPRIL 20 MG/1
20 TABLET ORAL DAILY
Qty: 180 TABLET | Refills: 1 | Status: SHIPPED | OUTPATIENT
Start: 2024-09-16 | End: 2024-09-19 | Stop reason: ALTCHOICE

## 2024-09-16 SDOH — ECONOMIC STABILITY: FOOD INSECURITY: WITHIN THE PAST 12 MONTHS, YOU WORRIED THAT YOUR FOOD WOULD RUN OUT BEFORE YOU GOT MONEY TO BUY MORE.: NEVER TRUE

## 2024-09-16 SDOH — ECONOMIC STABILITY: FOOD INSECURITY: WITHIN THE PAST 12 MONTHS, THE FOOD YOU BOUGHT JUST DIDN'T LAST AND YOU DIDN'T HAVE MONEY TO GET MORE.: NEVER TRUE

## 2024-09-16 ASSESSMENT — ENCOUNTER SYMPTOMS
ABDOMINAL PAIN: 0
VOMITING: 0
OCCASIONAL FEELINGS OF UNSTEADINESS: 0
CHILLS: 0
LOSS OF SENSATION IN FEET: 0
SHORTNESS OF BREATH: 0
NAUSEA: 0
DEPRESSION: 0
FEVER: 0

## 2024-09-16 ASSESSMENT — PATIENT HEALTH QUESTIONNAIRE - PHQ9
2. FEELING DOWN, DEPRESSED OR HOPELESS: NOT AT ALL
1. LITTLE INTEREST OR PLEASURE IN DOING THINGS: NOT AT ALL
SUM OF ALL RESPONSES TO PHQ9 QUESTIONS 1 AND 2: 0

## 2024-09-16 ASSESSMENT — LIFESTYLE VARIABLES: HOW MANY STANDARD DRINKS CONTAINING ALCOHOL DO YOU HAVE ON A TYPICAL DAY: PATIENT DOES NOT DRINK

## 2024-09-16 ASSESSMENT — PAIN SCALES - GENERAL: PAINLEVEL: 0-NO PAIN

## 2024-09-16 NOTE — PROGRESS NOTES
Subjective   Patient ID: Lory Taylor is a 54 y.o. female who presents for New Patient Visit (NPV).    Presents to establish care. Recently saw Cards who increased Lisinopril to 20 mg twice a day but script never called in. Had a STEMI back in 2018 resulting in bypass.          Review of Systems   Constitutional:  Negative for chills and fever.   Respiratory:  Negative for shortness of breath.    Cardiovascular:  Negative for chest pain.   Gastrointestinal:  Negative for abdominal pain, nausea and vomiting.       Objective   There were no vitals taken for this visit.    Physical Exam  Gen appearance: well groomed in NAD  A & O: alert and oriented x 3  CV: RRR   Lungs: CTA bilaterally  Extr: no edema   Assessment/Plan   HTN: pt will now start Lisinopril 230 mg twice a day.  Prediabetes: check A1c. Is not on a med  CAD/HF: follow up with Cards  UTD cologuard  Ordered Mamm  High chol: check lipids  RTO 3 mos  8.   Declines flu shot

## 2024-09-18 ENCOUNTER — TELEPHONE (OUTPATIENT)
Dept: SLEEP MEDICINE | Facility: HOSPITAL | Age: 54
End: 2024-09-18
Payer: COMMERCIAL

## 2024-09-18 NOTE — TELEPHONE ENCOUNTER
Pt calling for results on test performed in early September. Performed at [facility]. Left VM for sleep center to fax appropriate results to us

## 2024-09-19 ENCOUNTER — HOSPITAL ENCOUNTER (OUTPATIENT)
Dept: RADIOLOGY | Facility: CLINIC | Age: 54
Discharge: HOME | End: 2024-09-19
Payer: COMMERCIAL

## 2024-09-19 ENCOUNTER — APPOINTMENT (OUTPATIENT)
Dept: RADIOLOGY | Facility: CLINIC | Age: 54
End: 2024-09-19
Payer: COMMERCIAL

## 2024-09-19 VITALS — WEIGHT: 209 LBS | HEIGHT: 61 IN | BODY MASS INDEX: 39.46 KG/M2

## 2024-09-19 DIAGNOSIS — Z12.31 ENCOUNTER FOR SCREENING MAMMOGRAM FOR MALIGNANT NEOPLASM OF BREAST: ICD-10-CM

## 2024-09-19 DIAGNOSIS — I25.10 CAD S/P PERCUTANEOUS CORONARY ANGIOPLASTY: Primary | ICD-10-CM

## 2024-09-19 DIAGNOSIS — Z98.61 CAD S/P PERCUTANEOUS CORONARY ANGIOPLASTY: Primary | ICD-10-CM

## 2024-09-19 PROCEDURE — 77067 SCR MAMMO BI INCL CAD: CPT

## 2024-09-19 PROCEDURE — 77067 SCR MAMMO BI INCL CAD: CPT | Performed by: RADIOLOGY

## 2024-09-19 PROCEDURE — 77063 BREAST TOMOSYNTHESIS BI: CPT | Performed by: RADIOLOGY

## 2024-09-19 RX ORDER — LISINOPRIL 20 MG/1
20 TABLET ORAL 2 TIMES DAILY
Qty: 180 TABLET | Refills: 1 | Status: SHIPPED | OUTPATIENT
Start: 2024-09-19 | End: 2025-09-19

## 2024-09-20 DIAGNOSIS — E78.2 MIXED HYPERLIPIDEMIA: ICD-10-CM

## 2024-09-22 RX ORDER — ATORVASTATIN CALCIUM 80 MG/1
80 TABLET, FILM COATED ORAL DAILY
Qty: 90 TABLET | Refills: 0 | Status: SHIPPED | OUTPATIENT
Start: 2024-09-22

## 2024-09-23 NOTE — PROGRESS NOTES
"Assessment and Plan    07/29/2024 MRV head, which I personally reviewed, shows distal transverse sinus narrowing.  05/23/2024 CT head without contrast, which I personally reviewed, shows   02/16/2024 ultrasound duplex carotid arteries, by report, shows no lesion.  05/21/2024 MRI brain with contrast, by report from [facility], shows \"No sellar, parasellar, or suprasellar mass.     Empty sella, which can be seen in idiopathic intracranial hypertension. Correlation with ophthalmologic examination is recommended. \"  Prior head imaging.    09/24/2024 OCT RNFL OD 89 with borderline S thinning & OS 86 with borderline S thinning.    This 54 year-old woman with a history of DM2, HTN, HLD, bilateral carotid stenosis, KATEY, obesity, CAD, CHF presents for evaluation of idiopathic intracranial hypertension.    The examination shows nasal elevation concerning for papilledema, especially on the right with nasal peripapillary findings consistent with a high water jermaine. I strongly suspect idiopathic intracranial hypertension. Testing was negative for intracranial mass and venous sinus thrombosis. The possibility of meningitic disease and the confirmation of high cerebrospinal fluid (CSF) pressure could be confirmed by lumbar puncture, which I recommend.    Plan    Check CT head without contrast before lumbar puncture.  Check lumbar puncture with opening pressure in the lateral decubitus position with legs extended, protein, glucose & cell count with differential. If opening pressure > 25 cm water, drain to 20 cm water closing pressure up to a maximum of 25 ml CSF drained.  Continue acetazolamide 250 mg daily, possibly increasing if idiopathic intracranial hypertension is confirmed.    Follow up in 2-3 months with HVF & OCT. (dilated 9/24/2024)  "

## 2024-09-24 ENCOUNTER — APPOINTMENT (OUTPATIENT)
Dept: OPHTHALMOLOGY | Facility: CLINIC | Age: 54
End: 2024-09-24
Payer: COMMERCIAL

## 2024-09-24 DIAGNOSIS — G93.2 IIH (IDIOPATHIC INTRACRANIAL HYPERTENSION): Primary | ICD-10-CM

## 2024-09-24 PROCEDURE — 99215 OFFICE O/P EST HI 40 MIN: CPT | Performed by: PSYCHIATRY & NEUROLOGY

## 2024-09-24 PROCEDURE — 92133 CPTRZD OPH DX IMG PST SGM ON: CPT | Performed by: PSYCHIATRY & NEUROLOGY

## 2024-09-24 ASSESSMENT — CONF VISUAL FIELD
OD_SUPERIOR_NASAL_RESTRICTION: 0
OS_INFERIOR_TEMPORAL_RESTRICTION: 0
OD_INFERIOR_TEMPORAL_RESTRICTION: 0
OS_INFERIOR_NASAL_RESTRICTION: 0
OD_NORMAL: 1
OD_SUPERIOR_TEMPORAL_RESTRICTION: 0
OS_NORMAL: 1
METHOD: COUNTING FINGERS
OS_SUPERIOR_NASAL_RESTRICTION: 0
OS_SUPERIOR_TEMPORAL_RESTRICTION: 0
OD_INFERIOR_NASAL_RESTRICTION: 0

## 2024-09-24 ASSESSMENT — REFRACTION_WEARINGRX
OD_AXIS: 009
OS_CYLINDER: -1.50
OD_ADD: +2.00
OS_AXIS: 140
SPECS_TYPE: PAL
OD_CYLINDER: -1.50
OS_ADD: +2.00
OD_SPHERE: +0.75
OS_SPHERE: +0.50

## 2024-09-24 ASSESSMENT — ENCOUNTER SYMPTOMS
MUSCULOSKELETAL NEGATIVE: 0
CARDIOVASCULAR NEGATIVE: 0
GASTROINTESTINAL NEGATIVE: 0
RESPIRATORY NEGATIVE: 0
NEUROLOGICAL NEGATIVE: 0
CONSTITUTIONAL NEGATIVE: 0
PSYCHIATRIC NEGATIVE: 0
HEMATOLOGIC/LYMPHATIC NEGATIVE: 0
ALLERGIC/IMMUNOLOGIC NEGATIVE: 0
EYES NEGATIVE: 1
ENDOCRINE NEGATIVE: 0

## 2024-09-24 ASSESSMENT — EXTERNAL EXAM - RIGHT EYE: OD_EXAM: NORMAL

## 2024-09-24 ASSESSMENT — VISUAL ACUITY
OS_SC: 20/40
OS_CC: 20/30
OS_CC+: +1
OS_PH_SC+: -1
OS_PH_SC: 20/25
METHOD: SNELLEN - LINEAR
OD_CC: 20/20
OS_SC+: +1
OD_SC: 20/20

## 2024-09-24 ASSESSMENT — SLIT LAMP EXAM - LIDS
COMMENTS: NORMAL
COMMENTS: NORMAL

## 2024-09-24 ASSESSMENT — REFRACTION_MANIFEST
OS_CYLINDER: -2.25
OS_AXIS: 140
OS_SPHERE: +1.75

## 2024-09-24 ASSESSMENT — TONOMETRY
OS_IOP_MMHG: 15
IOP_METHOD: GOLDMANN APPLANATION
OD_IOP_MMHG: 15

## 2024-09-24 ASSESSMENT — EXTERNAL EXAM - LEFT EYE: OS_EXAM: NORMAL

## 2024-09-24 ASSESSMENT — CUP TO DISC RATIO
OD_RATIO: 0.2
OS_RATIO: 0.2

## 2024-09-26 DIAGNOSIS — R73.03 PREDIABETES: ICD-10-CM

## 2024-09-27 ENCOUNTER — TELEPHONE (OUTPATIENT)
Dept: NEUROLOGY | Facility: CLINIC | Age: 54
End: 2024-09-27
Payer: COMMERCIAL

## 2024-09-28 DIAGNOSIS — I10 PRIMARY HYPERTENSION: ICD-10-CM

## 2024-09-30 RX ORDER — METOPROLOL SUCCINATE 100 MG/1
100 TABLET, EXTENDED RELEASE ORAL DAILY
Qty: 90 TABLET | Refills: 0 | Status: SHIPPED | OUTPATIENT
Start: 2024-09-30

## 2024-09-30 NOTE — PROGRESS NOTES
Patient: Lory Taylor    [id number]  : 1970 -- AGE 54 y.o.    Provider: Ru Landaverde MD PhD     Location [facility]   Service Date: 10/2/2024          [facility]  Followup Visit Note      HISTORY OF PRESENT ILLNESS     The patient's referring provider is: Obdulio Grimm DO    HISTORY OF PRESENT ILLNESS   Lory Taylor is a 54 y.o. female with a past medical history significant for empty sella, intracranial idiopathic hypertension, T2DM, HTN, HLD, CAD (MI , s/p stent, on ASA), HFpEF (EF 60 to 65% 2023), KATEY who presents to a [facility] for a comprehensive sleep medicine evaluation.     PAST SLEEP HISTORY: Patient has a history of KATEY and is on CPAP. Not sure when she was diagnosed/started CPAP. She did have a home sleep study and thinks it was soon after the COVID pandemic started.  She was compliant and doing well with it up until several months to a year ago prior to 24 when we saw her.  She had noticed that her CPAP has been causing her sinus issues, headaches and nasal congestion upon waking.     CURRENT HISTORY:  At time of her last visit we had recommended a split night sleep study and for patient to bring her CPAP to the next appointment.    On today's visit, she had the sleep study done. She has not been using CPAP for the last 1-2 years. She was recently diagnosed with IIH. She stopped CPAP due to too much pressure in the nose. When she was using CPAP she felt better with her sleep and sleepiness. She also weighed more at about 245#. She has been on diamox for IIH, she has noted some improvement in blood pressure since starting that.    Today she feels she has good energy levels.  She is not excessively sleepy during the day.  She denies issues of snoring, gasping, choking.  She wants to review the sleep testing results that she had at the outside sleep lab.      Sleep schedule:  In bed: 10-11pm  Sleep  latency:  It takes few minutes to fall asleep.   Awakenings: Patient wakes up 1-2 times at night. Awakenings are due to nocturia and last few minutes.  Wake time: 5:30AM  Total sleep: 7-8h    Preferred sleeping position: SLEEP POSITION: supine, prone, and sidelying    On awakening: Was not feeling refreshed. She will exercise. If tired she might take a nap for an hour in the morning.     Daytime symptoms: Less napping then before  only on weekends.    ESS: 10  NICOLE: 6  FOSQ: 39      REVIEW OF SYSTEMS     REVIEW OF SYSTEMS: as above      ALLERGIES AND MEDICATIONS     ALLERGIES  Allergies   Allergen Reactions    Penicillins Unknown and Rash     Told as a baby had a reaction       MEDICATIONS  Current Outpatient Medications   Medication Sig Dispense Refill    acetaZOLAMIDE (Diamox) 250 mg tablet Take 1 tablet (250 mg) by mouth once daily at bedtime. 30 tablet 3    aspirin 81 mg EC tablet Take 1 tablet (81 mg) by mouth once daily. 90 tablet 3    atorvastatin (Lipitor) 80 mg tablet Take 1 tablet (80 mg) by mouth once daily. 90 tablet 0    biotin 5 mg capsule Take 1 capsule (5 mg) by mouth once daily.      cholecalciferol (Vitamin D-3) 25 MCG (1000 UT) capsule Take 1 capsule (25 mcg) by mouth once daily.      dihydroberberine (BERBERINE ES-5 ORAL) Take by mouth.      hydrALAZINE (Apresoline) 25 mg tablet Take 1 tablet (25 mg) by mouth 2 times a day. 180 tablet 1    lisinopril 20 mg tablet Take 1 tablet (20 mg) by mouth 2 times a day. 180 tablet 1    magnesium 200 mg tablet Take 1 tablet (200 mg) by mouth once daily.      metoprolol succinate XL (Toprol-XL) 100 mg 24 hr tablet Take 1 tablet (100 mg) by mouth once daily. 90 tablet 0    nitroglycerin (Nitrostat) 0.4 mg SL tablet Place 1 tablet (0.4 mg) under the tongue every 5 minutes if needed for chest pain.      taurine 500 mg capsule Take 500 capsules by mouth once daily.       No current facility-administered medications for this visit.         PAST HISTORY     PAST  MEDICAL HISTORY  She  has a past medical history of Cataract and Diabetes mellitus (Multi).    PAST SURGICAL HISTORY:  No past surgical history on file.    FAMILY HISTORY  Family History   Problem Relation Name Age of Onset    Glaucoma Neg Hx      Macular degeneration Neg Hx         She does have a family history of sleep disorder.    SOCIAL HISTORY  She  reports that she has never smoked. She has never been exposed to tobacco smoke. She has never used smokeless tobacco. She reports that she does not drink alcohol and does not use drugs. She currently lives with her .       Caffeine consumption: Yes, rarely (occasional)  Alcohol consumption: No  Smoking: No  Marijuana: No      PHYSICAL EXAM     VITAL SIGNS: /90   Pulse 54   Temp 36.4 °C (97.5 °F)   Wt 98.6 kg (217 lb 6.4 oz)   SpO2 98%   BMI 41.08 kg/m²      CURRENT WEIGHT:   Vitals:    10/02/24 0757   Weight: 98.6 kg (217 lb 6.4 oz)       Body mass index is 41.08 kg/m².  PREVIOUS WEIGHTS:  Wt Readings from Last 3 Encounters:   10/02/24 98.6 kg (217 lb 6.4 oz)   09/19/24 94.8 kg (209 lb)   09/16/24 97.3 kg (214 lb 9.6 oz)     BMI 37.55  Physical Exam:  General: Alert, oriented, conversant.  HEENT: Lateral facial profile with mild retrognathia, nasal septum deviated to the left, narrow nasal passages, turbinates boggy, oropharynx is Mallampati class 3, tongue large with scalloping, jaw occlusion class 1, dentition good.  Heart: RRR, no murmur  Lungs: CTAB, no wheezing  Extremities: no peripheral edema  Neurologic: Language is normal and fluent, face symmetric, tongue protrusion midline, stance and gait normal.   Psychiatric: Affect appropriate, mood appropriate.    RESULTS/DATA     Bicarbonate (mmol/L)   Date Value   05/23/2024 26   11/28/2023 27   12/06/2022 25   09/09/2022 27   08/20/2022 25         ASSESSMENT/PLAN     Ms. Taylor is a 54 y.o. female and She was referred to the [facility] for evaluation of KATEY on  CPAP.     KATEY.  Results of her most recent sleep testing indicates that she does not have obstructive sleep apnea with a RDI of 4.6 events/hour.  The events when seen were primarily confined to REM sleep only.  Her minimum oxygen saturation was only 90%.  While prior testing demonstrated that she might have had sleep apnea she has lost weight since that prior test, which may explain why she does not have sleep apnea now.    Given that I do not see any indication for treatment.  She is on acetazolamide which can reduce sleep apnea rates by approximately 50% when used.  This could be playing something of a role here.  Given that she is otherwise asymptomatic I do not see the need to pursue any other additional forms of testing.    She was reminded that if she gains weight and if her snoring increases or she is having worsening sleepiness that we might want to repeat sleep testing.    Idiopathic intracranial hypertension.  She is on acetazolamide and her headaches have significantly improved since he started on this.  She is followed by both ENT and neurology.  She is currently stable.    HTN. Patient has hypertension. her blood pressure today was 167/90 and they are asymptomatic. Treatment of their KATEY may benefit blood pressure control.      Obesity  The patient was counseled that her weight is the strongest modifiable risk factor and contributor for KATEY. She was counseled to consider weight loss options to include changes in dietary habits and activity.  Before initiating an exercise plan, she should carefully review the approach with her primary care provider.     Ru Landaverde MD PhD

## 2024-10-02 ENCOUNTER — APPOINTMENT (OUTPATIENT)
Dept: SLEEP MEDICINE | Facility: CLINIC | Age: 54
End: 2024-10-02
Payer: COMMERCIAL

## 2024-10-02 VITALS
TEMPERATURE: 97.5 F | OXYGEN SATURATION: 98 % | DIASTOLIC BLOOD PRESSURE: 90 MMHG | SYSTOLIC BLOOD PRESSURE: 167 MMHG | WEIGHT: 217.4 LBS | HEART RATE: 54 BPM | BODY MASS INDEX: 41.08 KG/M2

## 2024-10-02 DIAGNOSIS — G93.2 IIH (IDIOPATHIC INTRACRANIAL HYPERTENSION): ICD-10-CM

## 2024-10-02 DIAGNOSIS — G47.33 OSA (OBSTRUCTIVE SLEEP APNEA): Primary | ICD-10-CM

## 2024-10-02 DIAGNOSIS — I10 PRIMARY HYPERTENSION: ICD-10-CM

## 2024-10-02 PROCEDURE — 3077F SYST BP >= 140 MM HG: CPT | Performed by: INTERNAL MEDICINE

## 2024-10-02 PROCEDURE — 3062F POS MACROALBUMINURIA REV: CPT | Performed by: INTERNAL MEDICINE

## 2024-10-02 PROCEDURE — 1036F TOBACCO NON-USER: CPT | Performed by: INTERNAL MEDICINE

## 2024-10-02 PROCEDURE — 3049F LDL-C 100-129 MG/DL: CPT | Performed by: INTERNAL MEDICINE

## 2024-10-02 PROCEDURE — 4010F ACE/ARB THERAPY RXD/TAKEN: CPT | Performed by: INTERNAL MEDICINE

## 2024-10-02 PROCEDURE — 3080F DIAST BP >= 90 MM HG: CPT | Performed by: INTERNAL MEDICINE

## 2024-10-02 PROCEDURE — 3044F HG A1C LEVEL LT 7.0%: CPT | Performed by: INTERNAL MEDICINE

## 2024-10-02 PROCEDURE — 99214 OFFICE O/P EST MOD 30 MIN: CPT | Performed by: INTERNAL MEDICINE

## 2024-10-02 ASSESSMENT — PAIN SCALES - GENERAL: PAINLEVEL: 0-NO PAIN

## 2024-10-02 NOTE — PATIENT INSTRUCTIONS
[facility] Sleep Medicine  [address]  [facility]  [address]  [facility] [address]    [address]  [facility]  [address]  [facility] [address]  [facility]  [address]  [address]  [facility] [address]           NAME: Lory Taylor   DATE: 10/2/2024     Your Sleep Provider Today: Ru Landaverde MD PhD  Your Primary Care Physician: Obdulio Grimm, DO   Your Referring Provider: No ref. provider found    Thank you for coming to the Sleep Medicine Clinic today! Your sleep medicine provider today was: Ru Landaverde MD PhD Below is a summary of your treatment plan, other important information, and our contact numbers:  If you need to schedule an appointment, please call [phone]  If you need general assistance (e.g. forms completed, general questions), please call my , Sweetie, at [phone].  If you have a medical question about your sleep issues, please contact our nurses, Grace or Ashanti at [phone].   You can also contact us through [employer].      DIAGNOSIS:   Snoring        TREATMENT PLAN     Your recent sleep testing does not show that you have sleep apnea  You do not need treatment at this time.  Sleep on side      Follow-up Appointment:   Followup with me as needed      IMPORTANT INFORMATION     Call [phone] for medical emergencies.  Our offices are generally open from Monday-Friday, 9 am - 5 pm.  If you need to get in touch with me, you may either call me and my team(number is below) or you can use [employer].  If a referral for a test, for CPAP, or for another specialist was made, and you have not heard about scheduling this within a week, please call scheduling at [phone]).  If you are unable to make your appointment for clinic or an overnight study, kindly call the office at least 48 hours in advance to cancel and reschedule.  If you are on CPAP, please bring your device's card or  the device to each clinic appointment.   There are no supporting services by either the sleep doctors or their staff on weekends and Holidays, or after 5 PM on weekdays.   If you have been asked to come to a sleep study, make sure you bring toiletries, a comfy pillow, and any nighttime medications that you may regularly take. Also be sure to eat dinner before you arrive. We generally do not provide meals.      PRESCRIPTIONS     We require 7 days advanced notice for prescription refills. If we do not receive the request in this time, we cannot guarantee that your medication will be refilled in time.      IMPORTANT PHONE NUMBERS      scheduling for medical testin532 - [phone]   Sleep Medicine Clinic Fax: [phone]  Appointments (for Pediatric Sleep Clinic): [phone] - option 1  Appointments (for Adult Sleep Clinic): [phone] - option 2  Appointments (For Sleep Studies): [phone] - option 3  Behavioral Sleep Medicine: [phone]  Bariatric Surgery: [phone] ( Bariatric Surgery Website)   Sleep Surgery: [phone]  ENT (Otolaryngology): [phone]  Myofunctional Therapy (ENT): SANDY Nunez, Renetta Perdomo, Roddy Dolan; [phone]   [facility]ling Case; [phone]  Amish Joseph; [phone]  [facility]; [phone]  [facility] [phone] (option 1)  Headache Clinic (Neurology): [phone]  Neurology: [phone]  Psychiatry: [phone]  Pulmonary Function Testing (PFT) Center: [phone]  Pulmonary Medicine: [phone]  [employer] (DME): [phone]  [employer] (DME): [phone]  [facility] (DME): [phone]      COMMON PROVIDERS WE REFER TO     For Weight Loss - Dr. Taylor Shine - Call [phone]  For Sleep Surgery - Dr. Caprice Wright - Call [phone]      OUR ADULT SLEEP MEDICINE TEAM   Please do not hesitate to call the office or sleep  nurse with any questions between appointments:    Adult Sleep Nurses (Ashanti Smiley, RN and Grace Buenrostro, RN):  For clinical questions and refilling prescriptions: [phone]  Email sleep diaries and other documents at: [email or web address]    Adult Sleep Medicine Secretaries:  Katherine Sanchez (For Marcus/Abernathy/Krise/[place]/Yeh/[place]):   P: [phone]  F: [phone]  Sweetie Zaidi (For Landaverde/Guggenbiller): P: [phone]  Fax: [phone]  Isabella Lee (For Jurcevic/Blank): P: [phone]  F: [phone]  Rocio Escobedo (For [place]): P: [phone]  F: [phone]  Josefina Monzon (For Lisa/Yefri/Zakhary): P: [phone]  F: [phone]  Crystal Earl (For Reji/Gibson): P: [phone]  F: [phone]     Adult Sleep Medicine Advanced Practice Providers:  Tanvir Brennan ([place], [place])  Kiersten Asif ([facility])  Kathryn Feng CNP ([place], [place], [place])  Gali Bernabe CNP ([place], [place], [place])  Radha Savage ([place], [place], [place])  Olga Gibson CNP ([facility])        OUR SLEEP TESTING LOCATIONS     Our team will contact you to schedule your sleep study, however, you can contact us as follow:  Main Phone Line (scheduling only): [phone]), option 3  Adult and Pediatric Locations  [facility] (6 years and older): [facility] - 4th floor ([address] [place] [address]) After hours line: [phone]  [facility] (Main campus: All ages): [facility], 6th floor. After hours line: [phone]  [facility] (5 years and older; younger considered on case-by-case basis): [address]; [address], [address]. Scheduling  After hours line: [phone]   [place] (6 years and older): [address]; [address]; [address]  [facility] (6 years and older): [address] [facility], [address]  After hours line: [phone]   [place]nosticist (13 years and  "older) in [place]: [address], 2nd floor  After hours line: [phone]   [place] (13 year and older): [address]  After hours line: [phone]     Adult Only Locations:   [place] (18 years and older): [address] [facility], 2nd floor   [place] (18 years and older): [address] [facility]; 4th floor  After hours line: [phone]  [facility] (18 years and older) at [place]: [address] [place] [address]  After hours line: [phone]          CONTACTING YOUR SLEEP MEDICINE PROVIDER     Send a message directly to your provider through \"My Chart\", which is the email service through your  Records Account: [email or web address]   Call [phone] and leave a message. One of the administrative assistants will forward the message to your sleep medicine provider through \"My Chart\" and/or email.     Your sleep medicine provider for this visit was: Ru Landaverde MD PhD        "

## 2024-10-02 NOTE — TELEPHONE ENCOUNTER
Called [facility] sleep center, requested full report be sent to our office and left fax and callback #s

## 2024-10-04 ENCOUNTER — APPOINTMENT (OUTPATIENT)
Dept: NEUROLOGY | Facility: HOSPITAL | Age: 54
End: 2024-10-04
Payer: COMMERCIAL

## 2024-10-07 PROBLEM — R73.03 PREDIABETES: Status: ACTIVE | Noted: 2024-10-07

## 2024-10-07 NOTE — PROGRESS NOTES
Nutrition Initial Assessment:     Patient Lory Taylor is a 54 y.o. female being seen at [facility] who was referred by Chari Irene MD  on 9/26/24 for   1. Prediabetes  Referral to Nutrition Services          Nutrition Assessment    Patient Active Problem List   Diagnosis    Type 2 diabetes mellitus without complication (Multi)    Angina pectoris    Coronary artery disease without angina pectoris    Hypomagnesemia    Mixed hyperlipidemia    Hypertensive heart disease without congestive heart failure    Intermittent claudication of both lower extremities due to atherosclerosis (CMS-HCC)    Morbid obesity (Multi)    Occlusion and stenosis of bilateral carotid arteries    ACC/AHA stage C systolic heart failure    CAD S/P percutaneous coronary angioplasty    Combined form of age-related cataract, both eyes    HTN (hypertension)    Imbalance    Snoring    Tinnitus    KATEY (obstructive sleep apnea)    Encounter for screening mammogram for malignant neoplasm of breast    Prediabetes     Nutrition History:  Food & Nutrition History: Here for pre-DM diet education. Would like to talk about how to help to manage idiopathic intracranial HTN and stated this has not been officially dx but stated she has this. Noted she read online that leafy green veggies and tomatoes affected her IIH so she has been avoiding these. Has been following a Mediterranean diet; eating more chickpeas, chicken, fish. Eats only blueberries. Drinks almond milk but has cut back otherwise not much dairy products.  Food Allergies: none  Food Intolerances: none  Vitamin/mineral intake:  (previously took vit D & biotin but not taking them currently)   (previously was taking magnesium but has stopped)  Prescription medications: Nutrition-Related Complementary/Alternative Medicine Use: Berberine  GI Symptoms: none; Occurring >2 weeks?    Oral Problems: denies; Dentition: own  Sleep Habits: 7+ hrs continuous, 5-6 hrs continuous    Diet Recall:  Meal  "1: B: (9am) a few spoonfulls of lentils or leftovers from dinner  Meal 2: L: (12-3pm) similiar to dinner, but may include an egg or two  Meal 3: D: (7:30-8pm) baked chicken/salmon with hummus, rice, red/quinoa cake, cauliflower or asparagus  Snacks: Sometimes will finish lunch if she doesn't eat all of it; sometimes makes her own desserts so will eat this as a snack (brian garay); snacks usually mid-afternoon  Food Variety: Absent (eats blueberries for fruit; drinks almond milk but cut back; minimal dairy)  Oral Nutrition Supplement Use:  (used to drink a protein powder supplement but has not been doing this for awhile now)  Fluid Intake: water, rarley drinks soda but will drink Zevia, alomond milk, decaf green tea (cut back on this too)  Energy Intake: Good > 75 %    Food Preparation:  Cooking: Patient, Spouse/Significant Other  Grocery Shopping: Patient, Spouse/Significant Other  Dining Out: 1 to 3 times a month    Physical Activity:   Has a mini trampoline at home that she bounces on to build calf muscles. Lifts dumbbell wts or goes walking. Tries to do this daily for a 10-30mins a day.  Consistency: Yes    Patient self identified challenges to dietary/lifestyle changes: time constraints    Food Security/Insecurity: Food / Nutrition Program Participation:  (no issues)    Anthropometrics:  Height: 154.9 cm (5' 0.98\")   Weight: 98.3 kg (216 lb 11.4 oz) (standing wt obtained today)   BMI (Calculated): 40.97    IBW/kg (Dietitian Calculated): 47.7 kg Percent of IBW: 207 %     Adjusted Body Weight (kg): 60.4 kg    Weight History:   Daily Weight  10/08/24 : 98.3 kg (216 lb 11.4 oz)  10/02/24 : 98.6 kg (217 lb 6.4 oz)  09/19/24 : 94.8 kg (209 lb)  09/16/24 : 97.3 kg (214 lb 9.6 oz)  09/10/24 : 97.5 kg (215 lb)  07/29/24 : 98 kg (216 lb 0.8 oz)  07/12/24 : 98.4 kg (217 lb)  07/09/24 : 98.4 kg (217 lb)  05/29/24 : 98 kg (216 lb)  05/23/24 : 99.8 kg (220 lb)  05/07/24 : 97.8 kg (215 lb 8 oz)  04/22/24 : 97 kg (213 lb " "12.8 oz)  04/11/24 : 95.3 kg (210 lb 1.6 oz)  03/13/24 : 95.3 kg (210 lb)  02/22/24 : 97.1 kg (214 lb)  02/13/24 : 97.5 kg (215 lb)  02/13/24 : 96.2 kg (212 lb)  01/29/24 : 99.3 kg (219 lb)  01/26/24 : 99.3 kg (219 lb)  11/28/23 : 95.3 kg (210 lb)    Weight Change %:  Significant Weight Loss: No    Nutrition Focused Physical Exam Findings:  Performed/Deferred: Deferred as pt visually appears well-nourished with no signs of malnutrition      Nutrition Significant Labs:  CMP trend:    Recent Labs     05/23/24  1318 11/28/23  2119 12/06/22  1150   GLUCOSE 145* 120* 150*    135* 138   K 3.7 3.8 3.5    101 106   CO2 26 27 25   ANIONGAP 12 11 11   BUN 16 18 22   CREATININE 0.78 0.79 0.86   EGFR >90 90  --    CALCIUM 8.9 8.9 9.1   ALBUMIN 3.6 4.2 3.9   ALKPHOS 62 50 61   PROT 6.8 7.2 7.4   AST 22 22 20   BILITOT 0.4 0.5 0.3   ALT 25 21 23   , Lipid Panel trend:    Recent Labs     09/16/24  1456 03/09/21  0951 06/27/18  0435   CHOL 166 128 182   HDL 54.7 51.6 35.4*   LDLCALC 103*  --   --    LDLF  --  70 128*   VLDL 9 7 18   TRIG 43 34 91   , Hgb A1c trend:   Recent Labs     09/16/24  1456   HGBA1C 6.4*   , Vit D:   Lab Results   Component Value Date    VITD25 26 (A) 03/09/2021    , Iron Panel: No results found for: \"IRON\", \"TIBC\", \"FERRITIN\" , Vit B12: No results found for: \"PVXHBPKQ10\" , and Folate: No results found for: \"FOLATE\"     Nutrition Specific Medications:  Current Outpatient Medications   Medication Instructions    acetaZOLAMIDE (DIAMOX) 250 mg, oral, Nightly    aspirin 81 mg, oral, Daily    atorvastatin (LIPITOR) 80 mg, oral, Daily    biotin 5 mg, oral, Daily    cholecalciferol (VITAMIN D-3) 25 mcg, oral, Daily    dihydroberberine (BERBERINE ES-5 ORAL) oral    hydrALAZINE (APRESOLINE) 25 mg, oral, 2 times daily    lisinopril 20 mg, oral, 2 times daily    magnesium 200 mg tablet 1 tablet, oral, Daily    metoprolol succinate XL (TOPROL-XL) 100 mg, oral, Daily    nitroglycerin (NITROSTAT) 0.4 mg, " sublingual, Every 5 min PRN    taurine 500 mg capsule 500 capsules, oral, Daily        Estimated Needs: Did not discuss today    ;     ;         Nutrition Diagnosis   Malnutrition Diagnosis  Patient has Malnutrition Diagnosis: No    Nutrition Diagnosis  Patient has Nutrition Diagnosis: Yes  Diagnosis Status (1): New  Nutrition Diagnosis 1: Food and nutrition related knowledge deficit  Related to (1): limited or lack of prior nutrition-related education for pre-DM  As Evidenced by (1): per pt report of no previous diet education, inconsistent CHO intake and intake of unbalanced meals per diet recall       Nutrition Interventions/Recommendations   Nutrition Prescription: General healthy diet with focus on CHO modified diet for pre-DM management    Nutrition Interventions:   Food and Nutrient Delivery: Meals & Snacks: Energy-modified diet, General Healthful Diet, Carbohydrate-modified diet, Modify Composition of Meals/Snacks  Goals: 3 balanced meals a day with 1-2 balanced snacks as needed     Coordination of Care: Collaboration and referral of nutrition care: Collaboration by nutrition professional with other providers  Goals: Messaged PCP to place a vitamin D level order per pt request     Nutrition Education:   Nutrition Education Content: Content related nutrition education  Goals: Balanced meals using plate method, timing of meals, reviewed basics of what is a CHO and portion sizes of CHO food for meal planning, adequate hydration, benefits of exercise    Nutrition Education Topics Discussed:   Provided Keys for a Healthy Lifestyle Handout. Discussed the following:  Start a daily exercise routine. Adults should target 150 minutes of moderate intensity exercise each week. Start slow and work your way up to this amount. Provided examples of aerobic, resistance, and stretching/balance activities.  Plan balanced meals. The “Plate Method” is a tool used to plan balanced meals. Aim for the following:  One-third to half  "the plate (or the meal) should be a non-starchy vegetable. Non-starchy vegetables include broccoli, cauliflower, salad greens, carrots, cucumbers, asparagus, green beans, tomatoes, and many more.  One-third to a quarter of the plate should be a lean protein. Lean proteins include chicken, turkey, fish, lean beef, eggs, nuts, tofu.  One third to a quarter of the plate can be a complex starch or carbohydrate. Examples of complex starches / carbohydrates include starchy vegetables (potatoes, peas, corn, and butternut squash), grains (whole wheat bread, quinoa, and brown rice), legumes (lentils and beans), and fruit.  Olive oil should be the primary fat source used for cooking.  Use a 9-10 inch plate to help manage portions  Pre-plan meal ideas. Spending time planning upfront saves you from relying on convenience foods. It can also help you save money! Your plan does not need to be rigid, but you should have some idea of what meals you are going to make going into the week. Place this information on the refrigerator in plain sight. There are many products you can purchase to help keep you organized.   Stay hydrated with water or other lower calorie beverages. We often confuse our body's signal for thirst with being hungry. Make sure you are staying hydrated, preferably with water. The best indicator of hydration is your urine. It should be a pale yellow. Provided examples of sugar-free beverages and ways to flavor water.   Pay attention to your body's hunger and fullness cues. Introduced patient to using a scale of 1-10 to rate hunger / satiety. Reviewed signs of hunger that patient might or might not feel, and encouraged being attentive to these signals if they are present. Encouraged patient to eat when feeling a \"3-4\" on the scale instead of waiting for hunger to become more severe. Encouraged patient to aim to stop eating when feeling at a \"6\" (satisfied, but could eat a little more) or a \"7\" (full but not " uncomfortable). Recommended eating slowly and checking in with body to determine when body is really full. Discussed that it takes the brain around 10-15 minutes after eating to feel full. Encouraged using this method in conjunction with balanced foods on the plate using the Plate Method.      Discussed importance of consistent meal pattern   Discussed how eating consistently and balanced meals help improve satiety, boot metabolism and helps to improve blood glucose levels   Encouraged pt to eat first meal of the day within 1-2hrs after waking up to help boost metabolism   Encouraged meals and snacks to be  throughout the day with no more than a 3-4hr gap in between to help boost metabolism      Educational Handouts Provided: [facility] Balanced Breakfast, High Protein Meal Ideas, High Protein Snack Ideas, and Keys for a Healthy Lifestyle     Nutrition Counseling: Strategies: Nutrition counseling based on motivational interviewing strategy, Nutrition counseling based on goal setting strategy    Patient Goals: 1. Aim to have a balanced breakfast using the plate method 5 days a week    Readiness to Change : Good  Level of Understanding : Good  Anticipated Compliant : Good         Nutrition Monitoring and Evaluation   Food/Nutrient Related History Monitoring  Monitoring and Evaluation Plan: Meal/snack pattern  Meal/Snack Pattern: Estimated meal and snack pattern  Criteria: Consume 3 balanced meals per day using plate method & 1-2 balanced snacks a day     Body Composition/Growth/Weight History  Monitoring and Evaluation Plan: Weight  Weight: Measured weight  Criteria: Intentional weight loss of 0.5-2 lb per week, trending toward a clinically significant weight loss of 5-10% of current body weight.    Biochemical Data, Medical Tests and Procedures  Monitoring and Evaluation Plan: Glucose/endocrine profile  Glucose/Endocrine Profile: Hemoglobin A1c (HgbA1c)  Criteria: <5.7%           Follow Up: 6-8 weeks      Time  Spent  Prep time on day of patient encounter: 5 minutes  Time spent directly with patient, family or caregiver: 60 minutes  Documentation Time: 10 minutes

## 2024-10-08 ENCOUNTER — NUTRITION (OUTPATIENT)
Dept: NUTRITION | Facility: HOSPITAL | Age: 54
End: 2024-10-08
Payer: COMMERCIAL

## 2024-10-08 VITALS — BODY MASS INDEX: 40.92 KG/M2 | HEIGHT: 61 IN | WEIGHT: 216.71 LBS

## 2024-10-08 DIAGNOSIS — R73.03 PREDIABETES: Primary | ICD-10-CM

## 2024-10-08 PROCEDURE — 97802 MEDICAL NUTRITION INDIV IN: CPT

## 2024-10-08 NOTE — PATIENT INSTRUCTIONS
Nutrition Education Topics Discussed:   Provided Keys for a Healthy Lifestyle Handout. Discussed the following:  Start a daily exercise routine. Adults should target 150 minutes of moderate intensity exercise each week. Start slow and work your way up to this amount. Provided examples of aerobic, resistance, and stretching/balance activities.  Plan balanced meals. The “Plate Method” is a tool used to plan balanced meals. Aim for the following:  One-third to half the plate (or the meal) should be a non-starchy vegetable. Non-starchy vegetables include broccoli, cauliflower, salad greens, carrots, cucumbers, asparagus, green beans, tomatoes, and many more.  One-third to a quarter of the plate should be a lean protein. Lean proteins include chicken, turkey, fish, lean beef, eggs, nuts, tofu.  One third to a quarter of the plate can be a complex starch or carbohydrate. Examples of complex starches / carbohydrates include starchy vegetables (potatoes, peas, corn, and butternut squash), grains (whole wheat bread, quinoa, and brown rice), legumes (lentils and beans), and fruit.  Olive oil should be the primary fat source used for cooking.  Use a 9-10 inch plate to help manage portions  Pre-plan meal ideas. Spending time planning upfront saves you from relying on convenience foods. It can also help you save money! Your plan does not need to be rigid, but you should have some idea of what meals you are going to make going into the week. Place this information on the refrigerator in plain sight. There are many products you can purchase to help keep you organized.   Stay hydrated with water or other lower calorie beverages. We often confuse our body's signal for thirst with being hungry. Make sure you are staying hydrated, preferably with water. The best indicator of hydration is your urine. It should be a pale yellow. Provided examples of sugar-free beverages and ways to flavor water.   Pay attention to your body's hunger and  "fullness cues. Introduced patient to using a scale of 1-10 to rate hunger / satiety. Reviewed signs of hunger that patient might or might not feel, and encouraged being attentive to these signals if they are present. Encouraged patient to eat when feeling a \"3-4\" on the scale instead of waiting for hunger to become more severe. Encouraged patient to aim to stop eating when feeling at a \"6\" (satisfied, but could eat a little more) or a \"7\" (full but not uncomfortable). Recommended eating slowly and checking in with body to determine when body is really full. Discussed that it takes the brain around 10-15 minutes after eating to feel full. Encouraged using this method in conjunction with balanced foods on the plate using the Plate Method.      Discussed importance of consistent meal pattern   Discussed how eating consistently and balanced meals help improve satiety, boot metabolism and helps to improve blood glucose levels   Encouraged pt to eat first meal of the day within 1-2hrs after waking up to help boost metabolism   Encouraged meals and snacks to be  throughout the day with no more than a 3-4hr gap in between to help boost metabolism      Educational Handouts Provided: [facility] Balanced Breakfast, High Protein Meal Ideas, High Protein Snack Ideas, and Keys for a Healthy Lifestyle   "

## 2024-10-11 ENCOUNTER — HOSPITAL ENCOUNTER (OUTPATIENT)
Dept: RADIOLOGY | Facility: CLINIC | Age: 54
Discharge: HOME | End: 2024-10-11
Payer: COMMERCIAL

## 2024-10-11 DIAGNOSIS — G93.2 IIH (IDIOPATHIC INTRACRANIAL HYPERTENSION): ICD-10-CM

## 2024-10-11 PROCEDURE — 70450 CT HEAD/BRAIN W/O DYE: CPT

## 2024-10-14 ENCOUNTER — APPOINTMENT (OUTPATIENT)
Dept: PRIMARY CARE | Facility: CLINIC | Age: 54
End: 2024-10-14
Payer: COMMERCIAL

## 2024-10-15 ENCOUNTER — LAB (OUTPATIENT)
Dept: LAB | Facility: LAB | Age: 54
End: 2024-10-15
Payer: COMMERCIAL

## 2024-10-15 DIAGNOSIS — G93.2 IIH (IDIOPATHIC INTRACRANIAL HYPERTENSION): ICD-10-CM

## 2024-10-15 LAB
APTT PPP: 27 SECONDS (ref 27–38)
ERYTHROCYTE [DISTWIDTH] IN BLOOD BY AUTOMATED COUNT: 12 % (ref 11.5–14.5)
HCT VFR BLD AUTO: 43.6 % (ref 36–46)
HGB BLD-MCNC: 13.8 G/DL (ref 12–16)
INR PPP: 0.9 (ref 0.9–1.1)
MCH RBC QN AUTO: 30.5 PG (ref 26–34)
MCHC RBC AUTO-ENTMCNC: 31.7 G/DL (ref 32–36)
MCV RBC AUTO: 97 FL (ref 80–100)
NRBC BLD-RTO: 0 /100 WBCS (ref 0–0)
PLATELET # BLD AUTO: 168 X10*3/UL (ref 150–450)
PROTHROMBIN TIME: 10.3 SECONDS (ref 9.8–12.8)
RBC # BLD AUTO: 4.52 X10*6/UL (ref 4–5.2)
WBC # BLD AUTO: 4.5 X10*3/UL (ref 4.4–11.3)

## 2024-10-15 PROCEDURE — 85730 THROMBOPLASTIN TIME PARTIAL: CPT

## 2024-10-15 PROCEDURE — 85027 COMPLETE CBC AUTOMATED: CPT

## 2024-10-15 PROCEDURE — 85610 PROTHROMBIN TIME: CPT

## 2024-10-15 PROCEDURE — 36415 COLL VENOUS BLD VENIPUNCTURE: CPT

## 2024-10-16 ENCOUNTER — PATIENT MESSAGE (OUTPATIENT)
Dept: OPHTHALMOLOGY | Facility: CLINIC | Age: 54
End: 2024-10-16
Payer: COMMERCIAL

## 2024-10-16 ENCOUNTER — HOSPITAL ENCOUNTER (OUTPATIENT)
Dept: RADIOLOGY | Facility: HOSPITAL | Age: 54
Discharge: HOME | End: 2024-10-16
Payer: COMMERCIAL

## 2024-10-16 VITALS
OXYGEN SATURATION: 100 % | SYSTOLIC BLOOD PRESSURE: 142 MMHG | RESPIRATION RATE: 16 BRPM | HEART RATE: 53 BPM | DIASTOLIC BLOOD PRESSURE: 74 MMHG

## 2024-10-16 DIAGNOSIS — G93.2 IIH (IDIOPATHIC INTRACRANIAL HYPERTENSION): ICD-10-CM

## 2024-10-16 DIAGNOSIS — G93.2 IIH (IDIOPATHIC INTRACRANIAL HYPERTENSION): Primary | ICD-10-CM

## 2024-10-16 LAB
APPEARANCE CSF: CLEAR
APPEARANCE CSF: CLEAR
COLOR CSF: COLORLESS
COLOR CSF: COLORLESS
COLOR SPUN CSF: COLORLESS
COLOR SPUN CSF: COLORLESS
GLUCOSE CSF-MCNC: 73 MG/DL (ref 40–70)
PROT CSF-MCNC: 46 MG/DL (ref 15–45)
RBC # CSF AUTO: 7 /UL (ref 0–5)
RBC # CSF AUTO: 88 /UL (ref 0–5)
TUBE # CSF: ABNORMAL
TUBE # CSF: ABNORMAL
WBC # CSF AUTO: 1 /UL (ref 1–5)
WBC # CSF AUTO: 21 /UL (ref 1–5)

## 2024-10-16 PROCEDURE — 87483 CNS DNA AMP PROBE TYPE 12-25: CPT | Mod: AHULAB | Performed by: PSYCHIATRY & NEUROLOGY

## 2024-10-16 PROCEDURE — 82945 GLUCOSE OTHER FLUID: CPT | Performed by: PSYCHIATRY & NEUROLOGY

## 2024-10-16 PROCEDURE — 89050 BODY FLUID CELL COUNT: CPT | Performed by: PSYCHIATRY & NEUROLOGY

## 2024-10-16 PROCEDURE — 62328 DX LMBR SPI PNXR W/FLUOR/CT: CPT

## 2024-10-16 PROCEDURE — 87070 CULTURE OTHR SPECIMN AEROBIC: CPT | Mod: AHULAB | Performed by: PSYCHIATRY & NEUROLOGY

## 2024-10-16 RX ORDER — ACETAZOLAMIDE 250 MG/1
500 TABLET ORAL 2 TIMES DAILY
Qty: 120 TABLET | Refills: 2 | Status: SHIPPED | OUTPATIENT
Start: 2024-10-16 | End: 2025-01-14

## 2024-10-16 RX ORDER — LIDOCAINE HYDROCHLORIDE 10 MG/ML
INJECTION, SOLUTION INFILTRATION; PERINEURAL
Status: DISPENSED
Start: 2024-10-16 | End: 2024-10-16

## 2024-10-16 ASSESSMENT — PAIN SCALES - GENERAL
PAINLEVEL_OUTOF10: 0 - NO PAIN

## 2024-10-16 ASSESSMENT — PAIN - FUNCTIONAL ASSESSMENT
PAIN_FUNCTIONAL_ASSESSMENT: 0-10

## 2024-10-16 NOTE — POST-PROCEDURE NOTE
Interventional Radiology Brief Postprocedure Note    Attending: Magaly Victoria CNP    Assistant: none    Diagnosis: Optic disc edema    Description of procedure: Fluoroscopic guided lumbar puncture L3-4.   Opening pressure 27 cmH20 measured in the right lateral decubitus position.   16ml clear CSF obtained for specimen.   Closing pressure 19 cmH20.      Anesthesia:  Local    Complications: None    Estimated Blood Loss: none    Specimens: Yes     See detailed result report with images in PACS.    The patient tolerated the procedure well without incident or complication and is in stable condition.

## 2024-10-16 NOTE — PRE-PROCEDURE NOTE
Interventional Radiology Preprocedure Note    Indication for procedure: The encounter diagnosis was IIH (idiopathic intracranial hypertension).    Relevant review of systems:  headaches    Relevant Labs:   Lab Results   Component Value Date    CREATININE 0.78 05/23/2024    EGFR >90 05/23/2024    INR 0.9 10/15/2024    PROTIME 10.3 10/15/2024       Planned Sedation/Anesthesia: local    Airway assessment: normal    Directed physical examination:    A&Ox3  Breathing Unlabored  Ambulates without assistive devices.     Plan for Fl guided LP with opening pressures and CSF for specimen as ordered.     Benefits, risks and alternatives of procedure and planned sedation have been discussed with the patient and/or their representative. All questions answered and they agree to proceed.

## 2024-10-17 ENCOUNTER — APPOINTMENT (OUTPATIENT)
Dept: RADIOLOGY | Facility: HOSPITAL | Age: 54
End: 2024-10-17
Payer: COMMERCIAL

## 2024-10-17 LAB
C GATTII+NEOFOR DNA CSF QL NAA+NON-PROBE: NOT DETECTED
CMV DNA CSF QL NAA+NON-PROBE: NOT DETECTED
COLOR CSF: COLORLESS
E COLI K1 DNA CSF QL NAA+NON-PROBE: NOT DETECTED
EV RNA CSF QL NAA+NON-PROBE: NOT DETECTED
GP B STREP DNA CSF QL NAA+NON-PROBE: NOT DETECTED
HAEM INFLU DNA CSF QL NAA+NON-PROBE: NOT DETECTED
HHV6 DNA CSF QL NAA+NON-PROBE: NOT DETECTED
HSV1 DNA CSF QL NAA+NON-PROBE: NOT DETECTED
HSV2 DNA CSF QL NAA+NON-PROBE: NOT DETECTED
L MONOCYTOG DNA CSF QL NAA+NON-PROBE: NOT DETECTED
N MEN DNA CSF QL NAA+NON-PROBE: NOT DETECTED
PARECHOVIRUS A RNA CSF QL NAA+NON-PROBE: NOT DETECTED
S PNEUM DNA CSF QL NAA+NON-PROBE: NOT DETECTED
VZV DNA CSF QL NAA+NON-PROBE: NOT DETECTED

## 2024-10-20 LAB
BACTERIA CSF CULT: NORMAL
GRAM STN SPEC: NORMAL
GRAM STN SPEC: NORMAL

## 2024-10-23 LAB
BACTERIA CSF CULT: NORMAL
GRAM STN SPEC: NORMAL
GRAM STN SPEC: NORMAL

## 2024-11-02 DIAGNOSIS — I10 PRIMARY HYPERTENSION: ICD-10-CM

## 2024-11-04 RX ORDER — HYDRALAZINE HYDROCHLORIDE 25 MG/1
25 TABLET, FILM COATED ORAL 2 TIMES DAILY
Qty: 180 TABLET | Refills: 1 | Status: SHIPPED | OUTPATIENT
Start: 2024-11-04

## 2024-12-03 ENCOUNTER — APPOINTMENT (OUTPATIENT)
Dept: NEUROLOGY | Facility: CLINIC | Age: 54
End: 2024-12-03
Payer: COMMERCIAL

## 2024-12-03 VITALS
HEART RATE: 68 BPM | RESPIRATION RATE: 20 BRPM | DIASTOLIC BLOOD PRESSURE: 70 MMHG | TEMPERATURE: 97.3 F | SYSTOLIC BLOOD PRESSURE: 130 MMHG

## 2024-12-03 DIAGNOSIS — Z78.0 MENOPAUSE: Primary | ICD-10-CM

## 2024-12-03 PROCEDURE — 3049F LDL-C 100-129 MG/DL: CPT | Performed by: PSYCHIATRY & NEUROLOGY

## 2024-12-03 PROCEDURE — 4010F ACE/ARB THERAPY RXD/TAKEN: CPT | Performed by: PSYCHIATRY & NEUROLOGY

## 2024-12-03 PROCEDURE — 3078F DIAST BP <80 MM HG: CPT | Performed by: PSYCHIATRY & NEUROLOGY

## 2024-12-03 PROCEDURE — 99215 OFFICE O/P EST HI 40 MIN: CPT | Performed by: PSYCHIATRY & NEUROLOGY

## 2024-12-03 PROCEDURE — 3044F HG A1C LEVEL LT 7.0%: CPT | Performed by: PSYCHIATRY & NEUROLOGY

## 2024-12-03 PROCEDURE — 3075F SYST BP GE 130 - 139MM HG: CPT | Performed by: PSYCHIATRY & NEUROLOGY

## 2024-12-03 PROCEDURE — 3062F POS MACROALBUMINURIA REV: CPT | Performed by: PSYCHIATRY & NEUROLOGY

## 2024-12-03 ASSESSMENT — PATIENT HEALTH QUESTIONNAIRE - PHQ9
2. FEELING DOWN, DEPRESSED OR HOPELESS: NOT AT ALL
SUM OF ALL RESPONSES TO PHQ9 QUESTIONS 1 AND 2: 0
1. LITTLE INTEREST OR PLEASURE IN DOING THINGS: NOT AT ALL

## 2024-12-03 NOTE — PROGRESS NOTES
Feels headaches are  better since last visit in July 2024.   Thinks Diamox has been helpful and change in eating.   Felt she started craving food that she has read are helpful for her IIH.   Chick peas, black rice, red rice, lentils, hummus (homemade), cauliflower, asparagus, brussel sprouts.  Tammy and tumeric tea  Stopped spinach, kale, cheese    No longer experiencing facial and sinus pain. Was ongoing for 3 years until July 2024 and beginning of current treatments.     Occasional quick stabs of pain in head 2 days per week. Comes and goes quickly. Does not need to treat.     Saw neurophthalmology and ordered lumbar puncture.     Lumbar Puncture done October 2024.  Opening pressure 27 cmH20 measured in the right lateral decubitus position. 16 ml clear CSF obtained for specimen.   Closing pressure 19 cmH20.  Dr Easton increased diamox to 500 mg 2 x a day   She Did not write things down but headache had started to improve with initial acetazolamide dose    Neuro ophthalmologist increased acetazolamide after lumbar puncture and headaches improved even more.     Sleep is good. Averages 4-7  hours. Sometimes wakes and can't go back to sleep. 1-2 times per week difficulty to fall back to sleep.     Denies depression and anxiety.     Wonders how long she will need to be on the Acetazolamide. She did have increased headaches after Covid but is vague about onset and correlation to an event. It is difficult to say.   Wonders if this could have been caused by hormones and is there a test to check this. Rare HF.       To review what we discussed today   Assessment/Plan   Problem List Items Addressed This Visit    None  Visit Diagnoses       Menopause    -  Primary    Relevant Orders    Referral to [facility]            Your next appointment with me is 4 months.     Thank you for visiting the office of Dr Priti Lr. It was a pleasure working with you today.   Roxie Lai[address] Mon-Thursday  [facility]  [facility] Fridays  Our office phone number is [phone]. You can use this number to leave messages for Aimee or to schedule or reschedule an appointment or request refills.  If you were seen on Thursday afternoon or Friday our office will call on Monday or Tuesday to reschedule your appointment. If you do not receive a call please call us.   We are also available for messages on my chart. We make every effort to respond to your concerns by the end of the next business day.

## 2024-12-05 NOTE — PROGRESS NOTES
Sinus & Skull Base Surgery    Chief Complaint:  1.  Sinonasal symptoms associated with eating and drinking  2.  Rhinorrhea  3.  Nasal airway obstruction; deviated septum to the left  4.  Facial pain and pressure, headaches  5.  Obstructive sleep apnea on positive pressure  6.  Empty sella turcica    History Of Present Illness:    Lory Taylor presents since last being seen June 4, 2024.     Since her last evaluation with me she has been evaluated by Dr. Lr with neurology and Dr. Easton with ophthalmology.  She has initiated acetazolamide treatment.    Overall, her sinonasal symptoms are improving.  Her headaches and facial pain and pressure are significantly improved.    Main Symptoms:  Patient does not have anterior nasal drainage.  Patient does not have posterior nasal drainage.  Patient has nasal airway obstruction.  ? Related to weather.    Patient has facial pain.  Much improved.    Patient has facial pressure.  Much improved.    Patient does not have decreased sense of smell.   Associated Symptoms:  Patient has headaches.  Much improved.    Patient does not have nasal bleeding.    Medications currently on for sinonasal symptoms:  Azelastine as needed    Active Problems:  Patient Active Problem List   Diagnosis    Type 2 diabetes mellitus without complication (Multi)    Angina pectoris    Coronary artery disease without angina pectoris    Hypomagnesemia    Mixed hyperlipidemia    Hypertensive heart disease without congestive heart failure    Intermittent claudication of both lower extremities due to atherosclerosis (CMS-HCC)    Morbid obesity (Multi)    Occlusion and stenosis of bilateral carotid arteries    ACC/AHA stage C systolic heart failure    CAD S/P percutaneous coronary angioplasty    Combined form of age-related cataract, both eyes    HTN (hypertension)    Imbalance    Snoring    Tinnitus    KATEY (obstructive sleep apnea)    Encounter for screening mammogram for malignant neoplasm of  "breast    Prediabetes     Past Medical History:  She has a past medical history of Cataract and Diabetes mellitus (Multi).    Surgical History:  She has no past surgical history on file.    Family History:  Family History   Problem Relation Name Age of Onset    Glaucoma Neg Hx      Macular degeneration Neg Hx       Social History:  She reports that she has never smoked. She has never been exposed to tobacco smoke. She has never used smokeless tobacco. She reports that she does not drink alcohol and does not use drugs.    Allergies:  Penicillins    Current Meds:  Current Outpatient Medications:     acetaZOLAMIDE (Diamox) 250 mg tablet, TAKE 2 TABLETS (500 MG) BY MOUTH 2 TIMES A DAY., Disp: 360 tablet, Rfl: 0    aspirin 81 mg EC tablet, Take 1 tablet (81 mg) by mouth once daily., Disp: 90 tablet, Rfl: 3    atorvastatin (Lipitor) 80 mg tablet, Take 1 tablet (80 mg) by mouth once daily., Disp: 90 tablet, Rfl: 0    BERBERINE CHLORIDE ORAL, Take by mouth., Disp: , Rfl:     biotin 5 mg capsule, Take 1 capsule (5 mg) by mouth once daily., Disp: , Rfl:     cholecalciferol (Vitamin D-3) 25 MCG (1000 UT) capsule, Take 1 capsule (25 mcg) by mouth once daily., Disp: , Rfl:     hydrALAZINE (Apresoline) 25 mg tablet, TAKE 1 TABLET BY MOUTH TWICE A DAY, Disp: 180 tablet, Rfl: 1    lisinopril 20 mg tablet, Take 1 tablet (20 mg) by mouth 2 times a day., Disp: 180 tablet, Rfl: 1    magnesium 200 mg tablet, Take 1 tablet (200 mg) by mouth once daily., Disp: , Rfl:     metoprolol succinate XL (Toprol-XL) 100 mg 24 hr tablet, Take 1 tablet (100 mg) by mouth once daily., Disp: 90 tablet, Rfl: 0    nitroglycerin (Nitrostat) 0.4 mg SL tablet, Place 1 tablet (0.4 mg) under the tongue every 5 minutes if needed for chest pain., Disp: , Rfl:     taurine 500 mg capsule, Take 500 capsules by mouth once daily., Disp: , Rfl:     Vitals:  Visit Vitals  Ht 1.575 m (5' 2\")   Wt 94.3 kg (208 lb)   BMI 38.04 kg/m²   OB Status Postmenopausal   Smoking " Status Never   BSA 2.03 m²     Physical Exam:  No exam.    Results:  I reviewed her last neurology note with Dr. Lr from December 3, 2024.  She was referred to [facility] during that evaluation.    She had a lumbar puncture performed October 16, 2024 demonstrating and opening pressure of 27 cmH20 measured in the right lateral decubitus position.     I reviewed her last ophthalmology note with Dr. Easton from September 24, 2024.  He recommended a CT head before the lumbar puncture.    Provider Impressions:  1.  Sinonasal symptoms associated with eating and drinking  2.  Nasal airway obstruction; deviated septum to the left  3.  Facial pain and pressure, headaches -- improving   4.  Obstructive sleep apnea on positive pressure  5.  Empty sella turcica    Discussion:  Lory Taylor and I discussed her exam and symptoms.  She has had improvement after starting acetazolamide and I recommended continuation.    In regard to her ongoing sinonasal symptoms, she has used Azelastine as needed with benefit so I suggested using this more consistently at 2 sprays each nostril other once or twice per day.  If she is deriving benefit after 1 month I recommended continuation and if not discontinuation.  If at 1 month, she is having residual sinonasal symptoms I asked her to message me through epic and I am happy to offer additional intranasal options for her symptoms.  If she was doing well with Azelastine alone I recommend follow-up with me in 6 months.  She was amenable to this and all questions were answered.    Between face-to-face contact, review of the medical record, and documentation I spent 32 minutes on this evaluation during the day of service.    Scribe Attestation  By signing my name below, I, Matthew Mclaughlin, attest that this documentation has been prepared under the direction and in the presence of Darin Murrell MD.    Signature:  Darin Murrell MD

## 2024-12-10 ENCOUNTER — APPOINTMENT (OUTPATIENT)
Dept: OTOLARYNGOLOGY | Facility: CLINIC | Age: 54
End: 2024-12-10
Payer: COMMERCIAL

## 2024-12-10 VITALS — HEIGHT: 62 IN | BODY MASS INDEX: 38.28 KG/M2 | WEIGHT: 208 LBS

## 2024-12-10 DIAGNOSIS — R09.81 NASAL CONGESTION: Primary | ICD-10-CM

## 2024-12-10 DIAGNOSIS — G93.2 IIH (IDIOPATHIC INTRACRANIAL HYPERTENSION): ICD-10-CM

## 2024-12-10 PROCEDURE — 3062F POS MACROALBUMINURIA REV: CPT | Performed by: OTOLARYNGOLOGY

## 2024-12-10 PROCEDURE — 3008F BODY MASS INDEX DOCD: CPT | Performed by: OTOLARYNGOLOGY

## 2024-12-10 PROCEDURE — 3044F HG A1C LEVEL LT 7.0%: CPT | Performed by: OTOLARYNGOLOGY

## 2024-12-10 PROCEDURE — 4010F ACE/ARB THERAPY RXD/TAKEN: CPT | Performed by: OTOLARYNGOLOGY

## 2024-12-10 PROCEDURE — 99214 OFFICE O/P EST MOD 30 MIN: CPT | Performed by: OTOLARYNGOLOGY

## 2024-12-10 PROCEDURE — 3049F LDL-C 100-129 MG/DL: CPT | Performed by: OTOLARYNGOLOGY

## 2024-12-16 ENCOUNTER — OFFICE VISIT (OUTPATIENT)
Dept: PRIMARY CARE | Facility: CLINIC | Age: 54
End: 2024-12-16
Payer: COMMERCIAL

## 2024-12-16 VITALS
WEIGHT: 212.8 LBS | BODY MASS INDEX: 39.16 KG/M2 | OXYGEN SATURATION: 99 % | TEMPERATURE: 96.2 F | HEART RATE: 51 BPM | HEIGHT: 62 IN | RESPIRATION RATE: 16 BRPM | SYSTOLIC BLOOD PRESSURE: 156 MMHG | DIASTOLIC BLOOD PRESSURE: 91 MMHG

## 2024-12-16 DIAGNOSIS — R73.03 PREDIABETES: Primary | ICD-10-CM

## 2024-12-16 DIAGNOSIS — I25.10 CORONARY ARTERY DISEASE INVOLVING NATIVE CORONARY ARTERY OF NATIVE HEART WITHOUT ANGINA PECTORIS: ICD-10-CM

## 2024-12-16 DIAGNOSIS — I10 PRIMARY HYPERTENSION: ICD-10-CM

## 2024-12-16 DIAGNOSIS — E78.2 MIXED HYPERLIPIDEMIA: ICD-10-CM

## 2024-12-16 LAB
EST. AVERAGE GLUCOSE BLD GHB EST-MCNC: 137 MG/DL
HBA1C MFR BLD: 6.4 %

## 2024-12-16 PROCEDURE — 36415 COLL VENOUS BLD VENIPUNCTURE: CPT | Performed by: INTERNAL MEDICINE

## 2024-12-16 PROCEDURE — 3049F LDL-C 100-129 MG/DL: CPT | Performed by: INTERNAL MEDICINE

## 2024-12-16 PROCEDURE — 83036 HEMOGLOBIN GLYCOSYLATED A1C: CPT | Performed by: INTERNAL MEDICINE

## 2024-12-16 PROCEDURE — 3080F DIAST BP >= 90 MM HG: CPT | Performed by: INTERNAL MEDICINE

## 2024-12-16 PROCEDURE — 4010F ACE/ARB THERAPY RXD/TAKEN: CPT | Performed by: INTERNAL MEDICINE

## 2024-12-16 PROCEDURE — 99214 OFFICE O/P EST MOD 30 MIN: CPT | Performed by: INTERNAL MEDICINE

## 2024-12-16 PROCEDURE — 3008F BODY MASS INDEX DOCD: CPT | Performed by: INTERNAL MEDICINE

## 2024-12-16 PROCEDURE — 1036F TOBACCO NON-USER: CPT | Performed by: INTERNAL MEDICINE

## 2024-12-16 PROCEDURE — 3062F POS MACROALBUMINURIA REV: CPT | Performed by: INTERNAL MEDICINE

## 2024-12-16 PROCEDURE — 3044F HG A1C LEVEL LT 7.0%: CPT | Performed by: INTERNAL MEDICINE

## 2024-12-16 PROCEDURE — 3077F SYST BP >= 140 MM HG: CPT | Performed by: INTERNAL MEDICINE

## 2024-12-16 SDOH — ECONOMIC STABILITY: FOOD INSECURITY: WITHIN THE PAST 12 MONTHS, THE FOOD YOU BOUGHT JUST DIDN'T LAST AND YOU DIDN'T HAVE MONEY TO GET MORE.: NEVER TRUE

## 2024-12-16 SDOH — ECONOMIC STABILITY: FOOD INSECURITY: WITHIN THE PAST 12 MONTHS, YOU WORRIED THAT YOUR FOOD WOULD RUN OUT BEFORE YOU GOT MONEY TO BUY MORE.: NEVER TRUE

## 2024-12-16 ASSESSMENT — LIFESTYLE VARIABLES
HOW OFTEN DO YOU HAVE SIX OR MORE DRINKS ON ONE OCCASION: LESS THAN MONTHLY
HOW MANY STANDARD DRINKS CONTAINING ALCOHOL DO YOU HAVE ON A TYPICAL DAY: PATIENT DOES NOT DRINK

## 2024-12-16 ASSESSMENT — ENCOUNTER SYMPTOMS
ABDOMINAL PAIN: 0
LOSS OF SENSATION IN FEET: 0
DIARRHEA: 0
NAUSEA: 0
DEPRESSION: 0
OCCASIONAL FEELINGS OF UNSTEADINESS: 0
VOMITING: 0
FEVER: 0
SHORTNESS OF BREATH: 0
CHILLS: 0
UNEXPECTED WEIGHT CHANGE: 0
CONSTIPATION: 0

## 2024-12-16 ASSESSMENT — PAIN SCALES - GENERAL: PAINLEVEL_OUTOF10: 0-NO PAIN

## 2024-12-16 NOTE — PROGRESS NOTES
"Subjective   Patient ID: Lory Taylor is a 54 y.o. female who presents for Follow-up.    HPI     Last visit 9/16/24.     A1c at that time was 6.4. She has been continuing to make lifestyle changes, eating brown and wild rice, hummus, chicken, fish, and she mostly eats at home. Saw nutritionist in October. Checks BG at home, average past 90 day BG was 123, past month was 114.     Reports that her BP at home is 110s-120s/70s-80s. Does light exercise given IIH and tries to stay active throughout the day, walks at home. Adherent to all medications. Denies CP and SOB.     Declines flu and covid vaccines today. Up to date on mammogram, Cologuard and pap smear.       Review of Systems   Constitutional:  Negative for chills, fever and unexpected weight change.   Respiratory:  Negative for shortness of breath.    Cardiovascular:  Negative for chest pain and leg swelling.   Gastrointestinal:  Negative for abdominal pain, constipation, diarrhea, nausea and vomiting.       Objective   BP (!) 156/91   Pulse 51   Temp 35.7 °C (96.2 °F) (Temporal)   Resp 16   Ht 1.575 m (5' 2\")   Wt 96.5 kg (212 lb 12.8 oz)   SpO2 99%   BMI 38.92 kg/m²     Physical Exam  Constitutional:       Appearance: Normal appearance. She is not ill-appearing.   Cardiovascular:      Rate and Rhythm: Normal rate and regular rhythm.      Heart sounds: Normal heart sounds. No murmur heard.     No friction rub. No gallop.   Pulmonary:      Effort: Pulmonary effort is normal.      Breath sounds: Normal breath sounds. No wheezing, rhonchi or rales.   Abdominal:      General: There is no distension.      Palpations: Abdomen is soft. There is no mass.      Tenderness: There is no abdominal tenderness.   Musculoskeletal:      Right lower leg: No edema.      Left lower leg: No edema.   Skin:     General: Skin is warm and dry.   Neurological:      General: No focal deficit present.      Mental Status: She is alert.         Assessment/Plan   1. Prediabetes " (Primary)  - Diet controlled. Last A1c 6.4 in 9/2024.   - Continuing to make lifestyle changes and has noticed decrease in home BG readings. Will recheck A1c today to monitor prediabetes.   - Hemoglobin A1C    2. Primary hypertension  - In office BP elevated to 156/91, took BP meds today. Patient reports home BP readings of 110s-120s/70s-80s.  - Suspect white coat HTN. Will have patient follow up in 3 months to check BP, discussed with patient to bring home BP device to next visit so we can review home readings.   - Continue lisinopril 20mg BID and hydralazine 25mg BID. Discussed with patient the importance of continuing adherence to all current medications.     3. Mixed hyperlipidemia  - Continue Lipitor 80mg daily    4. Coronary artery disease involving native coronary artery of native heart without angina pectoris  - Follows with cardiology, next appt 6/2025  - Continue metoprolol 100mg and ASA 81mg daily    Follow up in 3 months.       Patient was seen and discussed with Dr. Chari Irene.     Emy Gibson, MS4     I evaluated the patient and personally participated in the key components, including history, physical examination and medical decision making.  I agree with the student's findings and plan as documented and have discussed the case and management of the patient's care with the student and patient. Greater than 35  minutes was spent in the care and coordination of care of the patient.

## 2024-12-17 ENCOUNTER — TELEPHONE (OUTPATIENT)
Dept: PRIMARY CARE | Facility: CLINIC | Age: 54
End: 2024-12-17
Payer: COMMERCIAL

## 2024-12-17 NOTE — TELEPHONE ENCOUNTER
Patient called asking is there anything else she can do to make her A1C decrease beside medication.  Patient also stated she thinks the lab did something wrong because she has the exact same result as last time.

## 2024-12-18 ENCOUNTER — TELEPHONE (OUTPATIENT)
Dept: PRIMARY CARE | Facility: CLINIC | Age: 54
End: 2024-12-18
Payer: COMMERCIAL

## 2024-12-18 NOTE — TELEPHONE ENCOUNTER
Patient called requesting a referral to endocrinology.   Patient also asked if you explain in her mychart why her number are the same and she want to know if she should still be taking her blood sugar everyday since the number are the same.

## 2024-12-20 DIAGNOSIS — R73.03 PREDIABETES: Primary | ICD-10-CM

## 2024-12-31 DIAGNOSIS — I10 PRIMARY HYPERTENSION: ICD-10-CM

## 2024-12-31 DIAGNOSIS — G93.2 IIH (IDIOPATHIC INTRACRANIAL HYPERTENSION): ICD-10-CM

## 2024-12-31 DIAGNOSIS — E78.2 MIXED HYPERLIPIDEMIA: ICD-10-CM

## 2024-12-31 RX ORDER — ACETAZOLAMIDE 250 MG/1
TABLET ORAL
Qty: 30 TABLET | Refills: 3 | Status: SHIPPED | OUTPATIENT
Start: 2024-12-31

## 2025-01-01 RX ORDER — ATORVASTATIN CALCIUM 80 MG/1
80 TABLET, FILM COATED ORAL DAILY
Qty: 90 TABLET | Refills: 1 | Status: SHIPPED | OUTPATIENT
Start: 2025-01-01

## 2025-01-01 RX ORDER — METOPROLOL SUCCINATE 100 MG/1
100 TABLET, EXTENDED RELEASE ORAL DAILY
Qty: 90 TABLET | Refills: 1 | Status: SHIPPED | OUTPATIENT
Start: 2025-01-01

## 2025-01-06 ENCOUNTER — APPOINTMENT (OUTPATIENT)
Dept: OPHTHALMOLOGY | Facility: CLINIC | Age: 55
End: 2025-01-06
Payer: COMMERCIAL

## 2025-01-06 DIAGNOSIS — G93.2 IIH (IDIOPATHIC INTRACRANIAL HYPERTENSION): Primary | ICD-10-CM

## 2025-01-06 PROCEDURE — 99214 OFFICE O/P EST MOD 30 MIN: CPT | Performed by: PSYCHIATRY & NEUROLOGY

## 2025-01-06 PROCEDURE — 92133 CPTRZD OPH DX IMG PST SGM ON: CPT | Performed by: PSYCHIATRY & NEUROLOGY

## 2025-01-06 PROCEDURE — 92083 EXTENDED VISUAL FIELD XM: CPT | Performed by: PSYCHIATRY & NEUROLOGY

## 2025-01-06 RX ORDER — ACETAZOLAMIDE 250 MG/1
500 TABLET ORAL 2 TIMES DAILY
Qty: 120 TABLET | Refills: 3 | Status: SHIPPED | OUTPATIENT
Start: 2025-01-06 | End: 2025-05-06

## 2025-01-06 ASSESSMENT — TONOMETRY
IOP_METHOD: TONOPEN
OD_IOP_MMHG: 11
OS_IOP_MMHG: 9

## 2025-01-06 ASSESSMENT — VISUAL ACUITY
OS_CC: 20/30
OD_CC+: -1
OD_CC: 20/20
CORRECTION_TYPE: GLASSES
METHOD: SNELLEN - LINEAR

## 2025-01-06 ASSESSMENT — SLIT LAMP EXAM - LIDS
COMMENTS: NORMAL
COMMENTS: NORMAL

## 2025-01-06 ASSESSMENT — REFRACTION_WEARINGRX
OS_CYLINDER: -1.50
OS_SPHERE: +0.50
OS_ADD: +2.00
SPECS_TYPE: PAL
OD_SPHERE: +0.75
OD_CYLINDER: -1.50
OS_AXIS: 140
OD_ADD: +2.00
OD_AXIS: 009

## 2025-01-06 ASSESSMENT — ENCOUNTER SYMPTOMS
RESPIRATORY NEGATIVE: 0
MUSCULOSKELETAL NEGATIVE: 0
PSYCHIATRIC NEGATIVE: 0
HEMATOLOGIC/LYMPHATIC NEGATIVE: 0
CARDIOVASCULAR NEGATIVE: 0
EYES NEGATIVE: 1
GASTROINTESTINAL NEGATIVE: 0
ENDOCRINE NEGATIVE: 0
NEUROLOGICAL NEGATIVE: 0
ALLERGIC/IMMUNOLOGIC NEGATIVE: 0
CONSTITUTIONAL NEGATIVE: 0

## 2025-01-06 ASSESSMENT — EXTERNAL EXAM - LEFT EYE: OS_EXAM: NORMAL

## 2025-01-06 ASSESSMENT — CUP TO DISC RATIO
OS_RATIO: 0.2
OD_RATIO: 0.2

## 2025-01-06 ASSESSMENT — EXTERNAL EXAM - RIGHT EYE: OD_EXAM: NORMAL

## 2025-01-06 NOTE — PROGRESS NOTES
"Assessment and Plan    10/11/2024 CT head without contrast, which I personally reviewed, shows no lesion.    07/29/2024 MRV head, which I personally reviewed previously, shows distal transverse sinus narrowing.  05/23/2024 CT head without contrast, which I personally reviewed previously, shows   02/16/2024 ultrasound duplex carotid arteries, by report, shows no lesion.  05/21/2024 MRI brain with contrast, by report from [facility], shows \"No sellar, parasellar, or suprasellar mass.     Empty sella, which can be seen in idiopathic intracranial hypertension. Correlation with ophthalmologic examination is recommended. \"  Prior head imaging.    10/16/2024 lumbar puncture opening pressure 27 cm water, tube 1: RBC 88, WBC 21, tube 4: RBC 7 & WBC 1, protein 46 & glucose 73. Meningitis PCR panel negative.    01/06/2025 OCT RNFL OD 89 & OS 86. (Stable)  09/24/2024 OCT RNFL OD 89 with borderline S thinning & OS 86 with borderline S thinning.    01/06/2025 HVF 24-2 OD fovea 47, FL 6/15, FP 9%, FN 8%, scatter, possible arcuate defects MD -3.52 & OS fovea 34, general reduction MD -5.52.    This 54 year-old woman with a history of idiopathic intracranial hypertension, DM2, HTN, HLD, bilateral carotid stenosis, KATEY, obesity, CAD, CHF presents in follow up for evaluation of idiopathic intracranial hypertension.    The suspected idiopathic intracranial hypertension diagnosis has been confirmed. Findings are consistent with idiopathic intracranial hypertension that has some low level residual activity. I recommend increasing acetazolamide to a more effective dose.    Plan    Increase acetazolamide from 250 mg PO BID to 500 mg PO BID.    Follow up in 3 months with HVF & OCT. (dilated 9/24/2024)  "

## 2025-01-22 ENCOUNTER — OFFICE VISIT (OUTPATIENT)
Dept: CARDIOLOGY | Facility: CLINIC | Age: 55
End: 2025-01-22
Payer: COMMERCIAL

## 2025-01-22 VITALS
HEIGHT: 62 IN | SYSTOLIC BLOOD PRESSURE: 147 MMHG | OXYGEN SATURATION: 99 % | WEIGHT: 210 LBS | DIASTOLIC BLOOD PRESSURE: 66 MMHG | BODY MASS INDEX: 38.64 KG/M2 | RESPIRATION RATE: 20 BRPM | HEART RATE: 64 BPM

## 2025-01-22 DIAGNOSIS — Z98.61 CAD S/P PERCUTANEOUS CORONARY ANGIOPLASTY: Primary | ICD-10-CM

## 2025-01-22 DIAGNOSIS — I11.9 HYPERTENSIVE HEART DISEASE WITHOUT CONGESTIVE HEART FAILURE: ICD-10-CM

## 2025-01-22 DIAGNOSIS — E78.2 MIXED HYPERLIPIDEMIA: ICD-10-CM

## 2025-01-22 DIAGNOSIS — G47.33 OSA (OBSTRUCTIVE SLEEP APNEA): ICD-10-CM

## 2025-01-22 DIAGNOSIS — R42 DIZZINESS: ICD-10-CM

## 2025-01-22 DIAGNOSIS — I10 PRIMARY HYPERTENSION: ICD-10-CM

## 2025-01-22 DIAGNOSIS — I25.10 CORONARY ARTERY DISEASE INVOLVING NATIVE CORONARY ARTERY OF NATIVE HEART WITHOUT ANGINA PECTORIS: ICD-10-CM

## 2025-01-22 DIAGNOSIS — I25.10 CAD S/P PERCUTANEOUS CORONARY ANGIOPLASTY: Primary | ICD-10-CM

## 2025-01-22 DIAGNOSIS — I65.23 OCCLUSION AND STENOSIS OF BILATERAL CAROTID ARTERIES: ICD-10-CM

## 2025-01-22 DIAGNOSIS — I70.213 INTERMITTENT CLAUDICATION OF BOTH LOWER EXTREMITIES DUE TO ATHEROSCLEROSIS (CMS-HCC): ICD-10-CM

## 2025-01-22 DIAGNOSIS — E11.9 TYPE 2 DIABETES MELLITUS WITHOUT COMPLICATION, UNSPECIFIED WHETHER LONG TERM INSULIN USE (MULTI): ICD-10-CM

## 2025-01-22 DIAGNOSIS — I50.20 ACC/AHA STAGE C SYSTOLIC HEART FAILURE: ICD-10-CM

## 2025-01-22 PROCEDURE — 99215 OFFICE O/P EST HI 40 MIN: CPT | Performed by: STUDENT IN AN ORGANIZED HEALTH CARE EDUCATION/TRAINING PROGRAM

## 2025-01-22 PROCEDURE — 3077F SYST BP >= 140 MM HG: CPT | Performed by: STUDENT IN AN ORGANIZED HEALTH CARE EDUCATION/TRAINING PROGRAM

## 2025-01-22 PROCEDURE — 4010F ACE/ARB THERAPY RXD/TAKEN: CPT | Performed by: STUDENT IN AN ORGANIZED HEALTH CARE EDUCATION/TRAINING PROGRAM

## 2025-01-22 PROCEDURE — 1036F TOBACCO NON-USER: CPT | Performed by: STUDENT IN AN ORGANIZED HEALTH CARE EDUCATION/TRAINING PROGRAM

## 2025-01-22 PROCEDURE — 3008F BODY MASS INDEX DOCD: CPT | Performed by: STUDENT IN AN ORGANIZED HEALTH CARE EDUCATION/TRAINING PROGRAM

## 2025-01-22 PROCEDURE — 3078F DIAST BP <80 MM HG: CPT | Performed by: STUDENT IN AN ORGANIZED HEALTH CARE EDUCATION/TRAINING PROGRAM

## 2025-01-22 ASSESSMENT — PATIENT HEALTH QUESTIONNAIRE - PHQ9
1. LITTLE INTEREST OR PLEASURE IN DOING THINGS: NOT AT ALL
2. FEELING DOWN, DEPRESSED OR HOPELESS: NOT AT ALL
2. FEELING DOWN, DEPRESSED OR HOPELESS: NOT AT ALL
1. LITTLE INTEREST OR PLEASURE IN DOING THINGS: NOT AT ALL
SUM OF ALL RESPONSES TO PHQ9 QUESTIONS 1 AND 2: 0
SUM OF ALL RESPONSES TO PHQ9 QUESTIONS 1 AND 2: 0

## 2025-01-22 ASSESSMENT — PAIN SCALES - GENERAL: PAINLEVEL_OUTOF10: 0-NO PAIN

## 2025-01-22 ASSESSMENT — ENCOUNTER SYMPTOMS
LOSS OF SENSATION IN FEET: 0
DEPRESSION: 0

## 2025-01-22 NOTE — PROGRESS NOTES
Follow-up visit    HPI:    Lory Taylor is a 54 y.o. female with pertinent history of sleep apnea, tinnitus, type 2 diabetes, Intracranial idiopathic hypertension obstructive coronary artery disease status post PCI to LAD June 2018 with history of recovered ischemic cardiomyopathy, history acute on chronic diastolic heart failure, preserved ejection fraction with impaired relaxation, normalization of prior regional wall motion abnormalities, mild right ventricular and right atrial dilation, mild aortic regurgitation echo performed 7/20/2023 no obstructive disease on carotid Dopplers performed 2/16/2024, idiopathic intracranial hypertension/ pseudotumor cerebri presents to cardiology clinic for follow up.     She is doing relatively well. Dyspnea on exertion is stable.  We spent a prolonged time reviewing her home blood pressure log on her device.  She does note that her blood pressure occasionally spikes but is normally well-controlled.  Blood pressure is a bit elevated in clinic today but likely more whitecoat hypertension from anxiety.  She was recently diagnosed with idiopathic intracranial hypertension and prolonged discussion about the natural history.  We also reviewed her last echo in detail.  No exacerbating or relieving factors.   Pt denies orthopnea, and paroxysmal nocturnal dyspnea.  Pt denies worsening lower extremity edema.  Pt denies palpitations or syncope.  No recent falls.  No fever or chills.  No cough.  No change in bowel or bladder habits.  No sick contacts.  No recent travel.    12 point review of systems including (Constitutional, Eyes, ENMT, Respiratory, Cardiac, Gastrointestinal, Neurological, Psychiatric, and Hematologic) was performed and is otherwise negative.    Past medical history:  As above.    Medications were reviewed.    Allergies were reviewed.    Family history:  No sudden cardiac death or premature coronary artery disease.     Social history reviewed:   reports that she has  never smoked. She has never been exposed to tobacco smoke. She has never used smokeless tobacco. She reports that she does not drink alcohol and does not use drugs.     Allergies reviewed: Penicillins     Medications reviewed:   Current Outpatient Medications   Medication Instructions    acetaZOLAMIDE (DIAMOX) 500 mg, oral, 2 times daily    aspirin 81 mg, oral, Daily    atorvastatin (LIPITOR) 80 mg, oral, Daily    BERBERINE CHLORIDE ORAL Take by mouth.    biotin 5 mg, Daily    cholecalciferol (VITAMIN D-3) 25 mcg, Daily    hydrALAZINE (APRESOLINE) 25 mg, oral, 2 times daily    lisinopril 20 mg, oral, 2 times daily    magnesium 200 mg tablet 1 tablet, Daily    metoprolol succinate XL (TOPROL-XL) 100 mg, oral, Daily    nitroglycerin (NITROSTAT) 0.4 mg, Every 5 min PRN    taurine 500 mg capsule 500 capsules, Daily        Vitals reviewed: Visit Vitals  /66 (BP Location: Right arm, Patient Position: Sitting, BP Cuff Size: Adult)   Pulse 64   Resp 20           Physical Exam:   General:  Patient is awake, alert, and oriented.  Patient is in no acute distress.  HEENT:  Pupils equal and reactive.  Normocephalic.  Moist mucosa.    Neck:  No thyromegaly.  Normal Jugular Venous Pressure.  Cardiovascular:  Regular rate and rhythm.  Normal S1 and S2.  1/6 MATTHEW.  Pulmonary:  Clear to auscultation bilaterally.  Abdomen:  Soft. Non-tender.   Non-distended.  Positive bowel sounds.  Lower Extremities:  2+ pedal pulses. No LE edema.  Neurologic:  Cranial nerves intact.  No focal deficit.   Skin: Skin warm and dry, normal skin turgor.   Psychiatric: Normal affect.    Last Labs:  CBC -      Lab Results   Component Value Date    WBC 4.5 10/15/2024    HGB 13.8 10/15/2024    HCT 43.6 10/15/2024     10/15/2024        CMP-  Lab Results   Component Value Date    GLUCOSE 145 (H) 05/23/2024     05/23/2024    K 3.7 05/23/2024     05/23/2024    CO2 26 05/23/2024    ANIONGAP 12 05/23/2024    BUN 16 05/23/2024    CREATININE  0.78 05/23/2024    EGFR >90 05/23/2024    CALCIUM 8.9 05/23/2024    PHOS 2.6 06/28/2018    PROT 6.8 05/23/2024    ALBUMIN 3.6 05/23/2024    AST 22 05/23/2024    ALT 25 05/23/2024    ALKPHOS 62 05/23/2024    BILITOT 0.4 05/23/2024        LIPIDS-  Lab Results   Component Value Date    CHOL 166 09/16/2024    TRIG 43 09/16/2024    HDL 54.7 09/16/2024    CHHDL 3.0 09/16/2024    VLDL 9 09/16/2024        OTHERS-  Lab Results   Component Value Date    HGBA1C 6.4 (H) 12/16/2024    BNP 30 11/28/2023        I personally reviewed the patient's recent vitals, labs, medications, orders, EKGs, pertinent cardiac imaging/ echocardiography and ischemic evaluations including stress testing/ cardiac catheterization.    Assessment and Plan:    Lory Taylor is a 54 y.o. female with pertinent history of sleep apnea, tinnitus, type 2 diabetes, Intracranial idiopathic hypertension obstructive coronary artery disease status post PCI to LAD June 2018 with history of recovered ischemic cardiomyopathy, history acute on chronic diastolic heart failure, preserved ejection fraction with impaired relaxation, normalization of prior regional wall motion abnormalities, mild right ventricular and right atrial dilation, mild aortic regurgitation echo performed 7/20/2023 no obstructive disease on carotid Dopplers performed 2/16/2024, idiopathic intracranial hypertension/ pseudotumor cerebri presents to cardiology clinic for follow up.  She is doing relatively well. Dyspnea on exertion is stable.  We spent a prolonged time reviewing her home blood pressure log on her device.  She does note that her blood pressure occasionally spikes but is normally well-controlled.  Blood pressure is a bit elevated in clinic today but likely more whitecoat hypertension from anxiety.  She was recently diagnosed with idiopathic intracranial hypertension and prolonged discussion about the natural history.  We also reviewed her last echo in detail.     Please continue  current cardiac medications including acetazolamide 500 mg twice daily, aspirin 81 mg, atorvastatin 80 mg daily, hydralazine 25 mg twice daily, lisinopril 20 mg twice daily, metoprolol succinate 100 mg daily, sublingual nitroglycerin as needed.    We will obtain a transthoracic echocardiogram for structural evaluation including ejection fraction, assessment of regional wall motion abnormalities or valvular disease, and further evaluation of hemodynamics prior to your follow-up visit.    Please followup with me in Cardiology clinic as scheduled.  Please return to clinic sooner or seek emergent care if your symptoms reoccur or worsen.    Thank you for allowing me to participate in their care.  Please feel free to call me with any further questions or concerns.        Jonah Flores MD, FACC, SUZIE HAGER  Division of Cardiovascular Medicine  Medical Director, [facility] [place]  Clinical , [facility] [employer]  [email or web address]   Office:  [phone]

## 2025-01-22 NOTE — PATIENT INSTRUCTIONS
Please continue current cardiac medications including acetazolamide 500 mg twice daily, aspirin 81 mg, atorvastatin 80 mg daily, hydralazine 25 mg twice daily, lisinopril 20 mg twice daily, metoprolol succinate 100 mg daily, sublingual nitroglycerin as needed.    We will obtain a transthoracic echocardiogram for structural evaluation including ejection fraction, assessment of regional wall motion abnormalities or valvular disease, and further evaluation of hemodynamics prior to your follow-up visit.    Please followup with me in Cardiology clinic as scheduled.  Please return to clinic sooner or seek emergent care if your symptoms reoccur or worsen.

## 2025-02-11 ENCOUNTER — ANCILLARY PROCEDURE (OUTPATIENT)
Dept: CARDIOLOGY | Facility: CLINIC | Age: 55
End: 2025-02-11
Payer: COMMERCIAL

## 2025-02-11 DIAGNOSIS — E78.2 MIXED HYPERLIPIDEMIA: ICD-10-CM

## 2025-02-11 DIAGNOSIS — R06.09 OTHER FORMS OF DYSPNEA: ICD-10-CM

## 2025-02-11 DIAGNOSIS — G47.33 OSA (OBSTRUCTIVE SLEEP APNEA): ICD-10-CM

## 2025-02-11 DIAGNOSIS — R42 DIZZINESS: ICD-10-CM

## 2025-02-11 DIAGNOSIS — H93.A9 PULSATILE TINNITUS: ICD-10-CM

## 2025-02-11 DIAGNOSIS — Z98.61 CAD S/P PERCUTANEOUS CORONARY ANGIOPLASTY: ICD-10-CM

## 2025-02-11 DIAGNOSIS — I10 PRIMARY HYPERTENSION: ICD-10-CM

## 2025-02-11 DIAGNOSIS — I25.10 CAD S/P PERCUTANEOUS CORONARY ANGIOPLASTY: ICD-10-CM

## 2025-02-11 DIAGNOSIS — I70.213 INTERMITTENT CLAUDICATION OF BOTH LOWER EXTREMITIES DUE TO ATHEROSCLEROSIS (CMS-HCC): ICD-10-CM

## 2025-02-11 DIAGNOSIS — I50.20 ACC/AHA STAGE C SYSTOLIC HEART FAILURE: ICD-10-CM

## 2025-02-11 DIAGNOSIS — I11.9 HYPERTENSIVE HEART DISEASE WITHOUT CONGESTIVE HEART FAILURE: ICD-10-CM

## 2025-02-11 DIAGNOSIS — I25.10 CORONARY ARTERY DISEASE INVOLVING NATIVE CORONARY ARTERY OF NATIVE HEART WITHOUT ANGINA PECTORIS: ICD-10-CM

## 2025-02-11 DIAGNOSIS — I65.23 OCCLUSION AND STENOSIS OF BILATERAL CAROTID ARTERIES: ICD-10-CM

## 2025-02-11 LAB
AORTIC VALVE PEAK VELOCITY: 1.93 M/S
AV PEAK GRADIENT: 15 MMHG
AVA (PEAK VEL): 2.6 CM2
EJECTION FRACTION APICAL 4 CHAMBER: 68.9
EJECTION FRACTION: 67 %
LEFT ATRIUM VOLUME AREA LENGTH INDEX BSA: 40.7 ML/M2
LEFT VENTRICLE INTERNAL DIMENSION DIASTOLE: 4.52 CM (ref 3.5–6)
LEFT VENTRICULAR OUTFLOW TRACT DIAMETER: 2.25 CM
MITRAL VALVE E/A RATIO: 1.07
RIGHT VENTRICLE FREE WALL PEAK S': 12 CM/S
TRICUSPID ANNULAR PLANE SYSTOLIC EXCURSION: 2.8 CM

## 2025-02-11 PROCEDURE — 93306 TTE W/DOPPLER COMPLETE: CPT | Performed by: STUDENT IN AN ORGANIZED HEALTH CARE EDUCATION/TRAINING PROGRAM

## 2025-02-11 PROCEDURE — 93306 TTE W/DOPPLER COMPLETE: CPT

## 2025-03-06 DIAGNOSIS — Z98.61 CAD S/P PERCUTANEOUS CORONARY ANGIOPLASTY: ICD-10-CM

## 2025-03-06 DIAGNOSIS — I25.10 CAD S/P PERCUTANEOUS CORONARY ANGIOPLASTY: ICD-10-CM

## 2025-03-06 RX ORDER — LISINOPRIL 20 MG/1
20 TABLET ORAL 2 TIMES DAILY
Qty: 180 TABLET | Refills: 1 | Status: SHIPPED | OUTPATIENT
Start: 2025-03-06

## 2025-03-12 ENCOUNTER — OFFICE VISIT (OUTPATIENT)
Dept: CARDIOLOGY | Facility: CLINIC | Age: 55
End: 2025-03-12
Payer: COMMERCIAL

## 2025-03-12 ENCOUNTER — ANCILLARY PROCEDURE (OUTPATIENT)
Dept: CARDIOLOGY | Facility: CLINIC | Age: 55
End: 2025-03-12
Payer: COMMERCIAL

## 2025-03-12 VITALS
SYSTOLIC BLOOD PRESSURE: 130 MMHG | WEIGHT: 208 LBS | BODY MASS INDEX: 35.51 KG/M2 | HEART RATE: 56 BPM | HEIGHT: 64 IN | OXYGEN SATURATION: 99 % | DIASTOLIC BLOOD PRESSURE: 70 MMHG

## 2025-03-12 DIAGNOSIS — R00.2 PALPITATIONS: ICD-10-CM

## 2025-03-12 DIAGNOSIS — I65.23 OCCLUSION AND STENOSIS OF BILATERAL CAROTID ARTERIES: ICD-10-CM

## 2025-03-12 DIAGNOSIS — E78.2 MIXED HYPERLIPIDEMIA: ICD-10-CM

## 2025-03-12 DIAGNOSIS — I50.20 ACC/AHA STAGE C SYSTOLIC HEART FAILURE: ICD-10-CM

## 2025-03-12 DIAGNOSIS — R00.2 PALPITATIONS: Primary | ICD-10-CM

## 2025-03-12 DIAGNOSIS — Z98.61 CAD S/P PERCUTANEOUS CORONARY ANGIOPLASTY: ICD-10-CM

## 2025-03-12 DIAGNOSIS — I25.10 CORONARY ARTERY DISEASE INVOLVING NATIVE CORONARY ARTERY OF NATIVE HEART WITHOUT ANGINA PECTORIS: ICD-10-CM

## 2025-03-12 DIAGNOSIS — I11.9 HYPERTENSIVE HEART DISEASE WITHOUT CONGESTIVE HEART FAILURE: ICD-10-CM

## 2025-03-12 DIAGNOSIS — R42 DIZZINESS: ICD-10-CM

## 2025-03-12 DIAGNOSIS — I10 PRIMARY HYPERTENSION: ICD-10-CM

## 2025-03-12 DIAGNOSIS — G47.33 OSA (OBSTRUCTIVE SLEEP APNEA): ICD-10-CM

## 2025-03-12 DIAGNOSIS — I25.10 CAD S/P PERCUTANEOUS CORONARY ANGIOPLASTY: ICD-10-CM

## 2025-03-12 DIAGNOSIS — E11.9 TYPE 2 DIABETES MELLITUS WITHOUT COMPLICATION, UNSPECIFIED WHETHER LONG TERM INSULIN USE (MULTI): ICD-10-CM

## 2025-03-12 PROCEDURE — 3078F DIAST BP <80 MM HG: CPT | Performed by: STUDENT IN AN ORGANIZED HEALTH CARE EDUCATION/TRAINING PROGRAM

## 2025-03-12 PROCEDURE — 1036F TOBACCO NON-USER: CPT | Performed by: STUDENT IN AN ORGANIZED HEALTH CARE EDUCATION/TRAINING PROGRAM

## 2025-03-12 PROCEDURE — 4010F ACE/ARB THERAPY RXD/TAKEN: CPT | Performed by: STUDENT IN AN ORGANIZED HEALTH CARE EDUCATION/TRAINING PROGRAM

## 2025-03-12 PROCEDURE — 99215 OFFICE O/P EST HI 40 MIN: CPT | Performed by: STUDENT IN AN ORGANIZED HEALTH CARE EDUCATION/TRAINING PROGRAM

## 2025-03-12 PROCEDURE — 3075F SYST BP GE 130 - 139MM HG: CPT | Performed by: STUDENT IN AN ORGANIZED HEALTH CARE EDUCATION/TRAINING PROGRAM

## 2025-03-12 PROCEDURE — 3008F BODY MASS INDEX DOCD: CPT | Performed by: STUDENT IN AN ORGANIZED HEALTH CARE EDUCATION/TRAINING PROGRAM

## 2025-03-12 NOTE — PATIENT INSTRUCTIONS
Please continue current cardiac medications including acetazolamide 500 mg twice daily, aspirin 81 mg, atorvastatin 80 mg daily, hydralazine 25 mg twice daily, lisinopril 20 mg twice daily, metoprolol succinate 100 mg daily, sublingual nitroglycerin as needed.    We will plan to perform an event monitor.     Please followup with me in Cardiology clinic within the next 4 months.  Please return to clinic sooner or seek emergent care if your symptoms reoccur or worsen.

## 2025-03-12 NOTE — PROGRESS NOTES
Follow-up visit    HPI:    Lory Taylor is a 54 y.o. female with pertinent history of sleep apnea, tinnitus, type 2 diabetes, idiopathic intracranial hypertension/ pseudotumor cerebri, obstructive coronary artery disease status post PCI to LAD June 2018 with history of recovered ischemic cardiomyopathy, history acute on chronic diastolic heart failure, no obstructive disease on carotid Dopplers performed 2/16/2024, preserved LVEF 67% with grade 2 diastolic dysfunction, moderate left atrial enlargement, mild aortic regurgitation on echo performed 2/11/2025 presents to cardiology clinic for follow up.     She is doing relatively well.  She does have a significant increase in palpitations.  Dyspnea on exertion is stable.  We spent a prolonged time reviewing her home blood pressure log on her device.  She does note that her blood pressure occasionally spikes but is normally well-controlled.  Blood pressure has been relatively well-controlled.  She is concerned that she has heard from friends that lisinopril can cause angioedema but she has not had any similar symptoms.  For a while we did discuss alternatives such as losartan at this point she would like to stay on this medication.  She was previously diagnosed with idiopathic intracranial hypertension and prolonged discussion about the natural history.  We also reviewed her recent echo in detail.  No exacerbating or relieving factors.   Pt denies orthopnea, and paroxysmal nocturnal dyspnea.  Pt denies worsening lower extremity edema.  Pt denies palpitations or syncope.  No recent falls.  No fever or chills.  No cough.  No change in bowel or bladder habits.  No sick contacts.  No recent travel.    12 point review of systems including (Constitutional, Eyes, ENMT, Respiratory, Cardiac, Gastrointestinal, Neurological, Psychiatric, and Hematologic) was performed and is otherwise negative.    Past medical history:  As above.    Medications were reviewed.    Allergies  were reviewed.    Family history:  No sudden cardiac death or premature coronary artery disease.     Social history reviewed:   reports that she has never smoked. She has never been exposed to tobacco smoke. She has never used smokeless tobacco. She reports that she does not drink alcohol and does not use drugs.     Allergies reviewed: Penicillins     Medications reviewed:   Current Outpatient Medications   Medication Instructions    acetaZOLAMIDE (DIAMOX) 500 mg, oral, 2 times daily    aspirin 81 mg, oral, Daily    atorvastatin (LIPITOR) 80 mg, oral, Daily    BERBERINE CHLORIDE ORAL Take by mouth.    biotin 5 mg, Daily    cholecalciferol (VITAMIN D-3) 25 mcg, Daily    hydrALAZINE (APRESOLINE) 25 mg, oral, 2 times daily    lisinopril 20 mg, oral, 2 times daily    magnesium 200 mg tablet 1 tablet, Daily    metoprolol succinate XL (TOPROL-XL) 100 mg, oral, Daily    nitroglycerin (NITROSTAT) 0.4 mg, Every 5 min PRN    taurine 500 mg capsule 500 capsules, Daily        Vitals reviewed: Visit Vitals  /70   Pulse 56           Physical Exam:   General:  Patient is awake, alert, and oriented.  Patient is in no acute distress.  HEENT:  Pupils equal and reactive.  Normocephalic.  Moist mucosa.    Neck:  No thyromegaly.  Normal Jugular Venous Pressure.  Cardiovascular:  Regular rate and rhythm.  Normal S1 and S2.  1/6 MATTHEW.  Pulmonary:  Clear to auscultation bilaterally.  Abdomen:  Soft. Non-tender.   Non-distended.  Positive bowel sounds.  Lower Extremities:  2+ pedal pulses. No LE edema.  Neurologic:  Cranial nerves intact.  No focal deficit.   Skin: Skin warm and dry, normal skin turgor.   Psychiatric: Normal affect.    Last Labs:  CBC -      Lab Results   Component Value Date    WBC 4.5 10/15/2024    HGB 13.8 10/15/2024    HCT 43.6 10/15/2024     10/15/2024        CMP-  Lab Results   Component Value Date    GLUCOSE 145 (H) 05/23/2024     05/23/2024    K 3.7 05/23/2024     05/23/2024    CO2 26  05/23/2024    ANIONGAP 12 05/23/2024    BUN 16 05/23/2024    CREATININE 0.78 05/23/2024    EGFR >90 05/23/2024    CALCIUM 8.9 05/23/2024    PHOS 2.6 06/28/2018    PROT 6.8 05/23/2024    ALBUMIN 3.6 05/23/2024    AST 22 05/23/2024    ALT 25 05/23/2024    ALKPHOS 62 05/23/2024    BILITOT 0.4 05/23/2024        LIPIDS-  Lab Results   Component Value Date    CHOL 166 09/16/2024    TRIG 43 09/16/2024    HDL 54.7 09/16/2024    CHHDL 3.0 09/16/2024    VLDL 9 09/16/2024        OTHERS-  Lab Results   Component Value Date    HGBA1C 6.4 (H) 12/16/2024    BNP 30 11/28/2023        I personally reviewed the patient's recent vitals, labs, medications, orders, EKGs, pertinent cardiac imaging/ echocardiography and ischemic evaluations including stress testing/ cardiac catheterization.    Assessment and Plan:    Lory Taylor is a 54 y.o. female with pertinent history of sleep apnea, tinnitus, type 2 diabetes, idiopathic intracranial hypertension/ pseudotumor cerebri, obstructive coronary artery disease status post PCI to LAD June 2018 with history of recovered ischemic cardiomyopathy, history acute on chronic diastolic heart failure, no obstructive disease on carotid Dopplers performed 2/16/2024, preserved LVEF 67% with grade 2 diastolic dysfunction, moderate left atrial enlargement, mild aortic regurgitation on echo performed 2/11/2025 presents to cardiology clinic for follow up. She is doing relatively well.  She does have a significant increase in palpitations.  Dyspnea on exertion is stable.  We spent a prolonged time reviewing her home blood pressure log on her device.  She does note that her blood pressure occasionally spikes but is normally well-controlled.  Blood pressure has been relatively well-controlled.  She is concerned that she has heard from friends that lisinopril can cause angioedema but she has not had any similar symptoms.  For a while we did discuss alternatives such as losartan at this point she would like to  stay on this medication.  She was previously diagnosed with idiopathic intracranial hypertension and prolonged discussion about the natural history.  We also reviewed her recent echo in detail.      Please continue current cardiac medications including acetazolamide 500 mg twice daily, aspirin 81 mg, atorvastatin 80 mg daily, hydralazine 25 mg twice daily, lisinopril 20 mg twice daily, metoprolol succinate 100 mg daily, sublingual nitroglycerin as needed.    Given the patient's symptoms and in order to further evaluate possible arrhythmias, we will plan to perform an event monitor.     Please followup with me in Cardiology clinic within the next 4 months.  Please return to clinic sooner or seek emergent care if your symptoms reoccur or worsen.    Thank you for allowing me to participate in their care.  Please feel free to call me with any further questions or concerns.        Jonah Flores MD, FAC, SU[facility]AGER  Division of Cardiovascular Medicine  Medical Director, [facility] [place]  Clinical , [employer]  [email or web address]   Office:  [phone]

## 2025-03-17 ENCOUNTER — OFFICE VISIT (OUTPATIENT)
Dept: PRIMARY CARE | Facility: CLINIC | Age: 55
End: 2025-03-17
Payer: COMMERCIAL

## 2025-03-17 ENCOUNTER — ANCILLARY PROCEDURE (OUTPATIENT)
Dept: CARDIOLOGY | Facility: CLINIC | Age: 55
End: 2025-03-17
Payer: COMMERCIAL

## 2025-03-17 VITALS
SYSTOLIC BLOOD PRESSURE: 135 MMHG | WEIGHT: 208.8 LBS | RESPIRATION RATE: 16 BRPM | DIASTOLIC BLOOD PRESSURE: 77 MMHG | BODY MASS INDEX: 35.65 KG/M2 | OXYGEN SATURATION: 100 % | HEIGHT: 64 IN | HEART RATE: 50 BPM | TEMPERATURE: 98 F

## 2025-03-17 VITALS — DIASTOLIC BLOOD PRESSURE: 68 MMHG | SYSTOLIC BLOOD PRESSURE: 140 MMHG

## 2025-03-17 DIAGNOSIS — R73.03 PREDIABETES: Primary | ICD-10-CM

## 2025-03-17 PROCEDURE — 1036F TOBACCO NON-USER: CPT | Performed by: INTERNAL MEDICINE

## 2025-03-17 PROCEDURE — 3008F BODY MASS INDEX DOCD: CPT | Performed by: INTERNAL MEDICINE

## 2025-03-17 PROCEDURE — 93247 EXT ECG>7D<15D SCAN A/R: CPT

## 2025-03-17 PROCEDURE — 99214 OFFICE O/P EST MOD 30 MIN: CPT | Mod: GC | Performed by: INTERNAL MEDICINE

## 2025-03-17 PROCEDURE — 3075F SYST BP GE 130 - 139MM HG: CPT | Performed by: INTERNAL MEDICINE

## 2025-03-17 PROCEDURE — 36415 COLL VENOUS BLD VENIPUNCTURE: CPT | Performed by: INTERNAL MEDICINE

## 2025-03-17 PROCEDURE — 99214 OFFICE O/P EST MOD 30 MIN: CPT | Performed by: INTERNAL MEDICINE

## 2025-03-17 PROCEDURE — 3078F DIAST BP <80 MM HG: CPT | Performed by: INTERNAL MEDICINE

## 2025-03-17 PROCEDURE — 4010F ACE/ARB THERAPY RXD/TAKEN: CPT | Performed by: INTERNAL MEDICINE

## 2025-03-17 SDOH — ECONOMIC STABILITY: FOOD INSECURITY: WITHIN THE PAST 12 MONTHS, YOU WORRIED THAT YOUR FOOD WOULD RUN OUT BEFORE YOU GOT MONEY TO BUY MORE.: NEVER TRUE

## 2025-03-17 SDOH — ECONOMIC STABILITY: FOOD INSECURITY: WITHIN THE PAST 12 MONTHS, THE FOOD YOU BOUGHT JUST DIDN'T LAST AND YOU DIDN'T HAVE MONEY TO GET MORE.: NEVER TRUE

## 2025-03-17 ASSESSMENT — ENCOUNTER SYMPTOMS
CONSTIPATION: 0
SHORTNESS OF BREATH: 0
DEPRESSION: 0
CHILLS: 0
LOSS OF SENSATION IN FEET: 0
ABDOMINAL PAIN: 0
NAUSEA: 0
UNEXPECTED WEIGHT CHANGE: 0
OCCASIONAL FEELINGS OF UNSTEADINESS: 0
FEVER: 0
VOMITING: 0
DIARRHEA: 0

## 2025-03-17 ASSESSMENT — PAIN SCALES - GENERAL: PAINLEVEL_OUTOF10: 0-NO PAIN

## 2025-03-17 ASSESSMENT — LIFESTYLE VARIABLES: HOW MANY STANDARD DRINKS CONTAINING ALCOHOL DO YOU HAVE ON A TYPICAL DAY: PATIENT DOES NOT DRINK

## 2025-03-17 NOTE — PROGRESS NOTES
"Subjective   Patient ID: Lory Taylor is a 54 y.o. female who presents for follow up.     HPI   Patient presented today after she got her event monitor for previously reported palpations. She reports that it has been occurring randomly but, infrequently. She denied any syncopal events or falls. Patient has been taking Bps at home and vast majority of her SBPs<130. She has been trying to lose weight and has been exercising regularly throughout the week.    Review of Systems   Constitutional:  Negative for chills, fever and unexpected weight change.   Respiratory:  Negative for shortness of breath.    Cardiovascular:  Negative for chest pain and leg swelling.   Gastrointestinal:  Negative for abdominal pain, constipation, diarrhea, nausea and vomiting.       Objective   /77   Pulse 50   Temp 36.7 °C (98 °F) (Temporal)   Resp 16   Ht 1.626 m (5' 4\")   Wt 94.7 kg (208 lb 12.8 oz)   SpO2 100%   BMI 35.84 kg/m²     Physical Exam  Constitutional:       Appearance: Normal appearance. She is not ill-appearing.   Cardiovascular:      Rate and Rhythm: Normal rate and regular rhythm.      Heart sounds: Normal heart sounds. No murmur heard.     No friction rub. No gallop.   Pulmonary:      Effort: Pulmonary effort is normal.      Breath sounds: Normal breath sounds. No wheezing, rhonchi or rales.   Abdominal:      General: There is no distension.      Palpations: Abdomen is soft. There is no mass.      Tenderness: There is no abdominal tenderness.   Musculoskeletal:      Right lower leg: No edema.      Left lower leg: No edema.   Skin:     General: Skin is warm and dry.   Neurological:      General: No focal deficit present.      Mental Status: She is alert.         Assessment/Plan   1. Prediabetes (Primary)  - Diet controlled. Last A1c 6.4 in 12/2024.   - Continuing to make lifestyle changes and has noticed decrease in home BG readings.  - Recheck Hemoglobin A1C at this visit     2. Primary hypertension  - " Continue lisinopril 20mg BID and hydralazine 25mg BID. At home BPS <130    3. Mixed hyperlipidemia  - Continue Lipitor 80mg daily    4. Coronary artery disease involving native coronary artery of native heart without angina pectoris  - Follows with cardiology  - Continue metoprolol 100mg and ASA 81mg daily    HM:  Colon- Repeat Cologaurd in 8/2026  Mammogram-Due in 9/2025  Pap-Repeat Pap in 2028  Non-smoker     RTO in 6 Months

## 2025-03-18 ENCOUNTER — APPOINTMENT (OUTPATIENT)
Dept: CARDIOLOGY | Facility: CLINIC | Age: 55
End: 2025-03-18
Payer: COMMERCIAL

## 2025-03-18 LAB
ALBUMIN SERPL-MCNC: 4.1 G/DL (ref 3.6–5.1)
ALP SERPL-CCNC: 81 U/L (ref 37–153)
ALT SERPL-CCNC: 15 U/L (ref 6–29)
ANION GAP SERPL CALCULATED.4IONS-SCNC: 9 MMOL/L (CALC) (ref 7–17)
AST SERPL-CCNC: 17 U/L (ref 10–35)
BILIRUB SERPL-MCNC: 0.5 MG/DL (ref 0.2–1.2)
BUN SERPL-MCNC: 19 MG/DL (ref 7–25)
CALCIUM SERPL-MCNC: 8.8 MG/DL (ref 8.6–10.4)
CHLORIDE SERPL-SCNC: 108 MMOL/L (ref 98–110)
CHOLEST SERPL-MCNC: 107 MG/DL
CHOLEST/HDLC SERPL: 2.7 (CALC)
CO2 SERPL-SCNC: 20 MMOL/L (ref 20–32)
CREAT SERPL-MCNC: 0.85 MG/DL (ref 0.5–1.03)
EGFRCR SERPLBLD CKD-EPI 2021: 82 ML/MIN/1.73M2
ERYTHROCYTE [DISTWIDTH] IN BLOOD BY AUTOMATED COUNT: 11.9 % (ref 11–15)
EST. AVERAGE GLUCOSE BLD GHB EST-MCNC: 134 MG/DL
EST. AVERAGE GLUCOSE BLD GHB EST-SCNC: 7.4 MMOL/L
GLUCOSE SERPL-MCNC: 131 MG/DL (ref 65–99)
HBA1C MFR BLD: 6.3 % OF TOTAL HGB
HCT VFR BLD AUTO: 43 % (ref 35–45)
HDLC SERPL-MCNC: 40 MG/DL
HGB BLD-MCNC: 13.8 G/DL (ref 11.7–15.5)
LDLC SERPL CALC-MCNC: 55 MG/DL (CALC)
MCH RBC QN AUTO: 30.9 PG (ref 27–33)
MCHC RBC AUTO-ENTMCNC: 32.1 G/DL (ref 32–36)
MCV RBC AUTO: 96.2 FL (ref 80–100)
NONHDLC SERPL-MCNC: 67 MG/DL (CALC)
PLATELET # BLD AUTO: 181 THOUSAND/UL (ref 140–400)
PMV BLD REES-ECKER: 11.1 FL (ref 7.5–12.5)
POTASSIUM SERPL-SCNC: 3.5 MMOL/L (ref 3.5–5.3)
PROT SERPL-MCNC: 6.6 G/DL (ref 6.1–8.1)
RBC # BLD AUTO: 4.47 MILLION/UL (ref 3.8–5.1)
SODIUM SERPL-SCNC: 137 MMOL/L (ref 135–146)
TRIGL SERPL-MCNC: 41 MG/DL
TSH SERPL-ACNC: 2.74 MIU/L
WBC # BLD AUTO: 5.6 THOUSAND/UL (ref 3.8–10.8)

## 2025-03-18 NOTE — PROGRESS NOTES
I saw and evaluated the patient. I personally obtained the key and critical portions of the history and physical exam or was physically present for key and critical portions performed by the resident/fellow. I reviewed the resident/fellow's documentation and discussed the patient with the resident/fellow. I agree with the resident/fellow's medical decision making as documented in the note.    Chari Irene MD

## 2025-04-02 ENCOUNTER — APPOINTMENT (OUTPATIENT)
Dept: ENDOCRINOLOGY | Facility: CLINIC | Age: 55
End: 2025-04-02
Payer: COMMERCIAL

## 2025-04-02 VITALS
SYSTOLIC BLOOD PRESSURE: 132 MMHG | HEART RATE: 57 BPM | RESPIRATION RATE: 16 BRPM | WEIGHT: 203.6 LBS | HEIGHT: 64 IN | BODY MASS INDEX: 34.76 KG/M2 | DIASTOLIC BLOOD PRESSURE: 80 MMHG

## 2025-04-02 DIAGNOSIS — E11.9 TYPE 2 DIABETES MELLITUS WITHOUT COMPLICATION, WITHOUT LONG-TERM CURRENT USE OF INSULIN: Primary | ICD-10-CM

## 2025-04-02 DIAGNOSIS — R73.03 PREDIABETES: ICD-10-CM

## 2025-04-02 PROBLEM — E11.65 TYPE 2 DIABETES MELLITUS WITH HYPERGLYCEMIA (MULTI): Status: ACTIVE | Noted: 2025-04-02

## 2025-04-02 PROCEDURE — 1036F TOBACCO NON-USER: CPT | Performed by: INTERNAL MEDICINE

## 2025-04-02 PROCEDURE — 3008F BODY MASS INDEX DOCD: CPT | Performed by: INTERNAL MEDICINE

## 2025-04-02 PROCEDURE — 3079F DIAST BP 80-89 MM HG: CPT | Performed by: INTERNAL MEDICINE

## 2025-04-02 PROCEDURE — 3075F SYST BP GE 130 - 139MM HG: CPT | Performed by: INTERNAL MEDICINE

## 2025-04-02 PROCEDURE — 4010F ACE/ARB THERAPY RXD/TAKEN: CPT | Performed by: INTERNAL MEDICINE

## 2025-04-02 PROCEDURE — 99204 OFFICE O/P NEW MOD 45 MIN: CPT | Performed by: INTERNAL MEDICINE

## 2025-04-02 ASSESSMENT — ENCOUNTER SYMPTOMS
VOMITING: 0
HEADACHES: 0
FATIGUE: 0
SHORTNESS OF BREATH: 0
PALPITATIONS: 0
NAUSEA: 0
DIARRHEA: 0
FEVER: 0
CHILLS: 0
COUGH: 0

## 2025-04-02 NOTE — PROGRESS NOTES
Endocrinology New Patient Consult  Subjective   Patient ID: Lory Taylor is a 54 y.o. female who presents for Prediabetes. Patient was referred by Dr. Irene.     PCP: Obdulio Grimm DO    HPI  54-year-old referred by Dr. Irene for evaluation of type 2 diabetes.  Patient states she was diagnosed with diabetes a few years ago on chart review approximately 2018 and has been working hard on her weight since.  She has been able to lose about 50 pounds and her most recent A1c was controlled at 6.3.  She has been tried on metformin in the past but was intolerant.  She is concerned that her A1c has been in the high fives range in the past and has increased.  She is up-to-date with her eye exam.  She has been exercising by walking and using a exercise trampoline and has been trying to watch her eating habits.  She has been feeling well and denies any complaints.    Review of Systems   Constitutional:  Negative for chills, fatigue and fever.   Respiratory:  Negative for cough and shortness of breath.    Cardiovascular:  Negative for chest pain and palpitations.   Gastrointestinal:  Negative for diarrhea, nausea and vomiting.   Neurological:  Negative for headaches.       Patient Active Problem List   Diagnosis    Type 2 diabetes mellitus without complication    Angina pectoris    Coronary artery disease without angina pectoris    Hypomagnesemia    Mixed hyperlipidemia    Hypertensive heart disease without congestive heart failure    Intermittent claudication of both lower extremities due to atherosclerosis    Morbid obesity (Multi)    Occlusion and stenosis of bilateral carotid arteries    ACC/AHA stage C systolic heart failure    CAD S/P percutaneous coronary angioplasty    Combined form of age-related cataract, both eyes    HTN (hypertension)    Imbalance    Snoring    Tinnitus    KATEY (obstructive sleep apnea)    Encounter for screening mammogram for malignant neoplasm of breast    Prediabetes    Type 2 diabetes  mellitus with hyperglycemia (Multi)       Past Medical History:   Diagnosis Date    Cataract     Diabetes mellitus (Multi)         No past surgical history on file.     Social History     Tobacco Use   Smoking Status Never    Passive exposure: Never   Smokeless Tobacco Never      Social History     Substance and Sexual Activity   Alcohol Use Never      Marital status:   Employment:     Family History   Problem Relation Name Age of Onset    Glaucoma Neg Hx      Macular degeneration Neg Hx          Home Meds:  Current Outpatient Medications   Medication Instructions    acetaZOLAMIDE (DIAMOX) 500 mg, oral, 2 times daily    aspirin 81 mg, oral, Daily    atorvastatin (LIPITOR) 80 mg, oral, Daily    BERBERINE CHLORIDE ORAL Take by mouth.    biotin 5 mg, Daily    cholecalciferol (VITAMIN D-3) 25 mcg, Daily    hydrALAZINE (APRESOLINE) 25 mg, oral, 2 times daily    lisinopril 20 mg, oral, 2 times daily    magnesium 200 mg tablet 1 tablet, Daily    metoprolol succinate XL (TOPROL-XL) 100 mg, oral, Daily    nitroglycerin (NITROSTAT) 0.4 mg, Every 5 min PRN    taurine 500 mg capsule 500 capsules, Daily        Allergies   Allergen Reactions    Penicillins Unknown and Rash     Told as a baby had a reaction        Objective   Vitals:    04/02/25 1025   BP: 132/80   Pulse: 57   Resp: 16      Vitals:    04/02/25 1025   Weight: 92.4 kg (203 lb 9.6 oz)      Body mass index is 34.95 kg/m².   Physical Exam  Constitutional:       Appearance: Normal appearance. She is overweight.   HENT:      Head: Normocephalic and atraumatic.   Neck:      Thyroid: No thyroid mass, thyromegaly or thyroid tenderness.   Cardiovascular:      Rate and Rhythm: Normal rate and regular rhythm.      Heart sounds: No murmur heard.     No gallop.   Pulmonary:      Effort: Pulmonary effort is normal.      Breath sounds: Normal breath sounds.   Abdominal:      Palpations: Abdomen is soft.      Comments: benign   Neurological:      General: No  "focal deficit present.      Mental Status: She is alert and oriented to person, place, and time.      Deep Tendon Reflexes: Reflexes are normal and symmetric.   Psychiatric:         Behavior: Behavior is cooperative.         Labs:  Lab Results   Component Value Date    HGBA1C 6.3 (H) 03/17/2025    TSH 2.74 03/17/2025      No results found for: \"PR1\", \"THYROIDPAB\", \"TSI\"     Assessment/Plan   Assessment & Plan  Prediabetes    Orders:    Referral to Endocrinology    Type 2 diabetes mellitus without complication, without long-term current use of insulin    54-year-old here for evaluation of diet-controlled type 2 diabetes.  We discussed her course and parameters for diagnosis of diabetes.  We also discussed control goals and that her A1c has been excellent since she is lost weight.  We did discuss type 2 diabetes medications that may help with her weight and reduce her cardiovascular risk most notably Jardiance and Ozempic.  She believes she has had discussions with her other providers regarding these agents.  Given her history of MI I did strongly recommend at least trying Jardiance.  She is very reluctant to start medications at this point.  I did reassure her that her A1c is excellent.  I encouraged her to keep up her efforts with diet and exercise and I will see her back as needed    Electronically signed by:  Gloria Quesada MD 04/02/25  10:26 AM    "

## 2025-04-02 NOTE — LETTER
April 2, 2025     Chari Irene MD  [id number]  [facility] [id number]    Patient: Lory Taylor   YOB: 1970   Date of Visit: 4/2/2025       Dear Dr. Chari Irene MD:    Thank you for referring Lory Taylor to me for evaluation. Below are my notes for this consultation.  If you have questions, please do not hesitate to call me. I look forward to following your patient along with you.       Sincerely,     Gloria Quesada MD      CC: No Recipients  ______________________________________________________________________________________    Endocrinology New Patient Consult  Subjective  Patient ID: Lory Taylor is a 54 y.o. female who presents for Prediabetes. Patient was referred by Dr. Irene.     PCP: Obdulio Grimm DO    HPI  54-year-old referred by Dr. Irene for evaluation of type 2 diabetes.  Patient states she was diagnosed with diabetes a few years ago on chart review approximately 2018 and has been working hard on her weight since.  She has been able to lose about 50 pounds and her most recent A1c was controlled at 6.3.  She has been tried on metformin in the past but was intolerant.  She is concerned that her A1c has been in the high fives range in the past and has increased.  She is up-to-date with her eye exam.  She has been exercising by walking and using a exercise trampoline and has been trying to watch her eating habits.  She has been feeling well and denies any complaints.    Review of Systems   Constitutional:  Negative for chills, fatigue and fever.   Respiratory:  Negative for cough and shortness of breath.    Cardiovascular:  Negative for chest pain and palpitations.   Gastrointestinal:  Negative for diarrhea, nausea and vomiting.   Neurological:  Negative for headaches.       Patient Active Problem List   Diagnosis   • Type 2 diabetes mellitus without complication   • Angina pectoris   • Coronary artery disease without angina pectoris   • Hypomagnesemia   • Mixed  hyperlipidemia   • Hypertensive heart disease without congestive heart failure   • Intermittent claudication of both lower extremities due to atherosclerosis   • Morbid obesity (Multi)   • Occlusion and stenosis of bilateral carotid arteries   • ACC/AHA stage C systolic heart failure   • CAD S/P percutaneous coronary angioplasty   • Combined form of age-related cataract, both eyes   • HTN (hypertension)   • Imbalance   • Snoring   • Tinnitus   • KATEY (obstructive sleep apnea)   • Encounter for screening mammogram for malignant neoplasm of breast   • Prediabetes   • Type 2 diabetes mellitus with hyperglycemia (Multi)       Past Medical History:   Diagnosis Date   • Cataract    • Diabetes mellitus (Multi)         No past surgical history on file.     Social History     Tobacco Use   Smoking Status Never   • Passive exposure: Never   Smokeless Tobacco Never      Social History     Substance and Sexual Activity   Alcohol Use Never      Marital status:   Employment:     Family History   Problem Relation Name Age of Onset   • Glaucoma Neg Hx     • Macular degeneration Neg Hx          Home Meds:  Current Outpatient Medications   Medication Instructions   • acetaZOLAMIDE (DIAMOX) 500 mg, oral, 2 times daily   • aspirin 81 mg, oral, Daily   • atorvastatin (LIPITOR) 80 mg, oral, Daily   • BERBERINE CHLORIDE ORAL Take by mouth.   • biotin 5 mg, Daily   • cholecalciferol (VITAMIN D-3) 25 mcg, Daily   • hydrALAZINE (APRESOLINE) 25 mg, oral, 2 times daily   • lisinopril 20 mg, oral, 2 times daily   • magnesium 200 mg tablet 1 tablet, Daily   • metoprolol succinate XL (TOPROL-XL) 100 mg, oral, Daily   • nitroglycerin (NITROSTAT) 0.4 mg, Every 5 min PRN   • taurine 500 mg capsule 500 capsules, Daily        Allergies   Allergen Reactions   • Penicillins Unknown and Rash     Told as a baby had a reaction        Objective  Vitals:    04/02/25 1025   BP: 132/80   Pulse: 57   Resp: 16      Vitals:    04/02/25 1025  "  Weight: 92.4 kg (203 lb 9.6 oz)      Body mass index is 34.95 kg/m².   Physical Exam  Constitutional:       Appearance: Normal appearance. She is overweight.   HENT:      Head: Normocephalic and atraumatic.   Neck:      Thyroid: No thyroid mass, thyromegaly or thyroid tenderness.   Cardiovascular:      Rate and Rhythm: Normal rate and regular rhythm.      Heart sounds: No murmur heard.     No gallop.   Pulmonary:      Effort: Pulmonary effort is normal.      Breath sounds: Normal breath sounds.   Abdominal:      Palpations: Abdomen is soft.      Comments: benign   Neurological:      General: No focal deficit present.      Mental Status: She is alert and oriented to person, place, and time.      Deep Tendon Reflexes: Reflexes are normal and symmetric.   Psychiatric:         Behavior: Behavior is cooperative.         Labs:  Lab Results   Component Value Date    HGBA1C 6.3 (H) 03/17/2025    TSH 2.74 03/17/2025      No results found for: \"PR1\", \"THYROIDPAB\", \"TSI\"     Assessment/Plan  Assessment & Plan  Prediabetes    Orders:  •  Referral to Endocrinology    Type 2 diabetes mellitus without complication, without long-term current use of insulin    54-year-old here for evaluation of diet-controlled type 2 diabetes.  We discussed her course and parameters for diagnosis of diabetes.  We also discussed control goals and that her A1c has been excellent since she is lost weight.  We did discuss type 2 diabetes medications that may help with her weight and reduce her cardiovascular risk most notably Jardiance and Ozempic.  She believes she has had discussions with her other providers regarding these agents.  Given her history of MI I did strongly recommend at least trying Jardiance.  She is very reluctant to start medications at this point.  I did reassure her that her A1c is excellent.  I encouraged her to keep up her efforts with diet and exercise and I will see her back as needed    Electronically signed by:  Gloria " Sangita KHANNA 04/02/25  10:26 AM

## 2025-04-02 NOTE — ASSESSMENT & PLAN NOTE
54-year-old here for evaluation of diet-controlled type 2 diabetes.  We discussed her course and parameters for diagnosis of diabetes.  We also discussed control goals and that her A1c has been excellent since she is lost weight.  We did discuss type 2 diabetes medications that may help with her weight and reduce her cardiovascular risk most notably Jardiance and Ozempic.  She believes she has had discussions with her other providers regarding these agents.  Given her history of MI I did strongly recommend at least trying Jardiance.  She is very reluctant to start medications at this point.  I did reassure her that her A1c is excellent.  I encouraged her to keep up her efforts with diet and exercise and I will see her back as needed

## 2025-04-02 NOTE — PATIENT INSTRUCTIONS
Keep up efforts with diet and exercise  Think about starting Jardiance as discussed  Follow-up as needed

## 2025-04-02 NOTE — ASSESSMENT & PLAN NOTE
Orders:    Referral to Endocrinology     Explanation (Does Not Render In The Note): A drug that requires intensive monitoring is a therapeutic agent that has the potential to cause serious morbidity or death. The monitoring is performed for assessment of these adverse effects and not primarily for assessment of therapeutic efficacy. The monitoring should be that which is generally accepted practice for the agent, but may be patient specific in some cases. Monitoring by history or examination does not qualify. Examples of monitoring that does not qualify include monitoring glucose levels during insulin therapy as the primary reason is the therapeutic effect or annual electrolytes and renal function for a patient on a diuretic as the frequency does not meet the threshold. Drug Being Monitored: Dupixent

## 2025-04-03 ENCOUNTER — HOSPITAL ENCOUNTER (EMERGENCY)
Facility: HOSPITAL | Age: 55
Discharge: HOME | End: 2025-04-03
Attending: EMERGENCY MEDICINE
Payer: COMMERCIAL

## 2025-04-03 ENCOUNTER — APPOINTMENT (OUTPATIENT)
Dept: CARDIOLOGY | Facility: HOSPITAL | Age: 55
End: 2025-04-03
Payer: COMMERCIAL

## 2025-04-03 ENCOUNTER — APPOINTMENT (OUTPATIENT)
Dept: RADIOLOGY | Facility: HOSPITAL | Age: 55
End: 2025-04-03
Payer: COMMERCIAL

## 2025-04-03 VITALS
BODY MASS INDEX: 35.97 KG/M2 | RESPIRATION RATE: 16 BRPM | HEIGHT: 63 IN | SYSTOLIC BLOOD PRESSURE: 136 MMHG | TEMPERATURE: 98.4 F | WEIGHT: 203 LBS | DIASTOLIC BLOOD PRESSURE: 76 MMHG | OXYGEN SATURATION: 98 % | HEART RATE: 57 BPM

## 2025-04-03 DIAGNOSIS — R42 VERTIGO: ICD-10-CM

## 2025-04-03 DIAGNOSIS — R42 DIZZINESS: Primary | ICD-10-CM

## 2025-04-03 LAB
ALBUMIN SERPL BCP-MCNC: 3.7 G/DL (ref 3.4–5)
ALP SERPL-CCNC: 70 U/L (ref 33–110)
ALT SERPL W P-5'-P-CCNC: 13 U/L (ref 7–45)
ANION GAP SERPL CALC-SCNC: 11 MMOL/L (ref 10–20)
APPEARANCE UR: CLEAR
AST SERPL W P-5'-P-CCNC: 16 U/L (ref 9–39)
ATRIAL RATE: 57 BPM
BASOPHILS # BLD AUTO: 0.03 X10*3/UL (ref 0–0.1)
BASOPHILS NFR BLD AUTO: 0.6 %
BILIRUB SERPL-MCNC: 0.4 MG/DL (ref 0–1.2)
BILIRUB UR STRIP.AUTO-MCNC: NEGATIVE MG/DL
BUN SERPL-MCNC: 22 MG/DL (ref 6–23)
CALCIUM SERPL-MCNC: 8.6 MG/DL (ref 8.6–10.3)
CARDIAC TROPONIN I PNL SERPL HS: 3 NG/L (ref 0–13)
CARDIAC TROPONIN I PNL SERPL HS: 4 NG/L (ref 0–13)
CHLORIDE SERPL-SCNC: 108 MMOL/L (ref 98–107)
CO2 SERPL-SCNC: 20 MMOL/L (ref 21–32)
COLOR UR: COLORLESS
CREAT SERPL-MCNC: 0.95 MG/DL (ref 0.5–1.05)
EGFRCR SERPLBLD CKD-EPI 2021: 72 ML/MIN/1.73M*2
EOSINOPHIL # BLD AUTO: 0.04 X10*3/UL (ref 0–0.7)
EOSINOPHIL NFR BLD AUTO: 0.8 %
ERYTHROCYTE [DISTWIDTH] IN BLOOD BY AUTOMATED COUNT: 12.2 % (ref 11.5–14.5)
GLUCOSE SERPL-MCNC: 150 MG/DL (ref 74–99)
GLUCOSE UR STRIP.AUTO-MCNC: NORMAL MG/DL
HCT VFR BLD AUTO: 40.6 % (ref 36–46)
HGB BLD-MCNC: 13.4 G/DL (ref 12–16)
IMM GRANULOCYTES # BLD AUTO: 0.01 X10*3/UL (ref 0–0.7)
IMM GRANULOCYTES NFR BLD AUTO: 0.2 % (ref 0–0.9)
KETONES UR STRIP.AUTO-MCNC: NEGATIVE MG/DL
LEUKOCYTE ESTERASE UR QL STRIP.AUTO: NEGATIVE
LYMPHOCYTES # BLD AUTO: 1.77 X10*3/UL (ref 1.2–4.8)
LYMPHOCYTES NFR BLD AUTO: 33.7 %
MAGNESIUM SERPL-MCNC: 1.64 MG/DL (ref 1.6–2.4)
MCH RBC QN AUTO: 31.1 PG (ref 26–34)
MCHC RBC AUTO-ENTMCNC: 33 G/DL (ref 32–36)
MCV RBC AUTO: 94 FL (ref 80–100)
MONOCYTES # BLD AUTO: 0.43 X10*3/UL (ref 0.1–1)
MONOCYTES NFR BLD AUTO: 8.2 %
NEUTROPHILS # BLD AUTO: 2.97 X10*3/UL (ref 1.2–7.7)
NEUTROPHILS NFR BLD AUTO: 56.5 %
NITRITE UR QL STRIP.AUTO: NEGATIVE
NRBC BLD-RTO: 0 /100 WBCS (ref 0–0)
P AXIS: 38 DEGREES
P OFFSET: 179 MS
P ONSET: 123 MS
PH UR STRIP.AUTO: 6.5 [PH]
PLATELET # BLD AUTO: 181 X10*3/UL (ref 150–450)
POTASSIUM SERPL-SCNC: 4.4 MMOL/L (ref 3.5–5.3)
PR INTERVAL: 184 MS
PROT SERPL-MCNC: 6.9 G/DL (ref 6.4–8.2)
PROT UR STRIP.AUTO-MCNC: NEGATIVE MG/DL
Q ONSET: 215 MS
QRS COUNT: 10 BEATS
QRS DURATION: 84 MS
QT INTERVAL: 442 MS
QTC CALCULATION(BAZETT): 430 MS
QTC FREDERICIA: 434 MS
R AXIS: 27 DEGREES
RBC # BLD AUTO: 4.31 X10*6/UL (ref 4–5.2)
RBC # UR STRIP.AUTO: NEGATIVE MG/DL
SODIUM SERPL-SCNC: 135 MMOL/L (ref 136–145)
SP GR UR STRIP.AUTO: 1.01
T AXIS: 39 DEGREES
T OFFSET: 436 MS
UROBILINOGEN UR STRIP.AUTO-MCNC: NORMAL MG/DL
VENTRICULAR RATE: 57 BPM
WBC # BLD AUTO: 5.3 X10*3/UL (ref 4.4–11.3)

## 2025-04-03 PROCEDURE — 70498 CT ANGIOGRAPHY NECK: CPT | Performed by: RADIOLOGY

## 2025-04-03 PROCEDURE — 99285 EMERGENCY DEPT VISIT HI MDM: CPT | Mod: 25 | Performed by: EMERGENCY MEDICINE

## 2025-04-03 PROCEDURE — 81003 URINALYSIS AUTO W/O SCOPE: CPT

## 2025-04-03 PROCEDURE — 70496 CT ANGIOGRAPHY HEAD: CPT | Performed by: RADIOLOGY

## 2025-04-03 PROCEDURE — 36415 COLL VENOUS BLD VENIPUNCTURE: CPT | Performed by: EMERGENCY MEDICINE

## 2025-04-03 PROCEDURE — 83735 ASSAY OF MAGNESIUM: CPT | Performed by: EMERGENCY MEDICINE

## 2025-04-03 PROCEDURE — 85025 COMPLETE CBC W/AUTO DIFF WBC: CPT | Performed by: EMERGENCY MEDICINE

## 2025-04-03 PROCEDURE — 2500000002 HC RX 250 W HCPCS SELF ADMINISTERED DRUGS (ALT 637 FOR MEDICARE OP, ALT 636 FOR OP/ED)

## 2025-04-03 PROCEDURE — 84484 ASSAY OF TROPONIN QUANT: CPT | Performed by: EMERGENCY MEDICINE

## 2025-04-03 PROCEDURE — 2550000001 HC RX 255 CONTRASTS: Performed by: EMERGENCY MEDICINE

## 2025-04-03 PROCEDURE — 80053 COMPREHEN METABOLIC PANEL: CPT | Performed by: EMERGENCY MEDICINE

## 2025-04-03 PROCEDURE — 70496 CT ANGIOGRAPHY HEAD: CPT

## 2025-04-03 PROCEDURE — 93005 ELECTROCARDIOGRAM TRACING: CPT

## 2025-04-03 RX ORDER — MECLIZINE HYDROCHLORIDE 25 MG/1
25 TABLET ORAL ONCE
Status: COMPLETED | OUTPATIENT
Start: 2025-04-03 | End: 2025-04-03

## 2025-04-03 RX ORDER — MECLIZINE HYDROCHLORIDE 25 MG/1
25 TABLET ORAL 4 TIMES DAILY
Qty: 28 TABLET | Refills: 0 | Status: SHIPPED | OUTPATIENT
Start: 2025-04-03 | End: 2025-04-10

## 2025-04-03 RX ADMIN — MECLIZINE HYDROCHLORIDE 25 MG: 25 TABLET ORAL at 12:07

## 2025-04-03 RX ADMIN — IOHEXOL 75 ML: 350 INJECTION, SOLUTION INTRAVENOUS at 14:50

## 2025-04-03 ASSESSMENT — PAIN - FUNCTIONAL ASSESSMENT: PAIN_FUNCTIONAL_ASSESSMENT: 0-10

## 2025-04-03 ASSESSMENT — PAIN SCALES - GENERAL: PAINLEVEL_OUTOF10: 0 - NO PAIN

## 2025-04-03 NOTE — ED NOTES
This RN spoke with juany from CT to see when patient can get her Ct. She states they will come to get her soon.      Eleni Skiffey, RN  04/03/25 [id number]

## 2025-04-03 NOTE — DISCHARGE INSTRUCTIONS
You were seen today due to concerns of dizziness.  You have been prescribed meclizine to take as needed for persistent dizziness.  You need to follow-up with your neurologist and your cardiologist regarding your medication doses for both your blood pressure and IH.  Please keep a record of your blood pressure, follow-up with physical therapy for your vestibular symptoms.  Return if you have any worsening symptoms

## 2025-04-03 NOTE — ED NOTES
This RN spoke with katerin from CT to see why there has been such a delay in patient getting her CT. She states that she will see where patient is on the list and will call back      Eleni Skiffey, RN  04/03/25 2190

## 2025-04-03 NOTE — ED PROVIDER NOTES
CC: Dizziness (Intermittent starting this morning)     HPI: Lory Taylor is an 54 y.o. female with history including type 2 diabetes, CAD status post PCI, IIH, cardiomyopathy, chronic diastolic heart failure, sleep apnea, tinnitus presenting to the emergency department for lightheadedness and vertigo symptoms.  Patient reports that symptoms have been worsening today but denies any speech difficulty, difficulty swallowing.  She does report that she feels like she is leaning towards the left a little bit when she ambulates.  She reports symptoms like this have come up in the past but are more severe today.  She has not lost consciousness or fallen.  She denies any chest pain or shortness of breath associated with the symptoms.  Did endorse to triage nurse that this is a similar feeling to when she had her last MRI.  Denies any diaphoresis, nausea or vomiting.  Patient denies any vision changes, has no ataxia and does have some mild right beating nystagmus without diplopia. Symptoms have been on-going more severely since 5 AM.     Limitations to History: none  Additional History Obtained from:     PMHx/PSHx:  Per HPI.   - has a past medical history of Cataract and Diabetes mellitus (Multi).  - has no past surgical history on file.    Social History:  - Tobacco:  reports that she has never smoked. She has never been exposed to tobacco smoke. She has never used smokeless tobacco.   - Alcohol:  reports no history of alcohol use.   - Drugs:  reports no history of drug use.     Medications: Reviewed in EMR.     Allergies:  Penicillins    ???????????????????????????????????????????????????????????????  Triage Vitals:  T 36.9 °C (98.4 °F)  HR 60  /66  RR 17  O2 100 % None (Room air)    Physical Exam  Vitals and nursing note reviewed.   Constitutional:       General: She is not in acute distress.     Appearance: She is well-developed.   HENT:      Head: Normocephalic and atraumatic.      Mouth/Throat:       Mouth: Mucous membranes are dry.      Pharynx: Oropharynx is clear.   Eyes:      Conjunctiva/sclera: Conjunctivae normal.   Cardiovascular:      Rate and Rhythm: Normal rate and regular rhythm.      Heart sounds: No murmur heard.  Pulmonary:      Effort: Pulmonary effort is normal. No respiratory distress.      Breath sounds: Normal breath sounds. No wheezing, rhonchi or rales.   Abdominal:      General: There is no distension.      Palpations: Abdomen is soft.      Tenderness: There is no abdominal tenderness. There is no guarding or rebound.   Musculoskeletal:         General: No swelling or tenderness.      Cervical back: Neck supple.   Skin:     General: Skin is warm and dry.      Capillary Refill: Capillary refill takes less than 2 seconds.   Neurological:      General: No focal deficit present.      Mental Status: She is alert and oriented to person, place, and time.      Sensory: No sensory deficit.      Motor: No weakness.      Coordination: Coordination normal.      Gait: Gait normal.   Psychiatric:         Mood and Affect: Mood normal.       ???????????????????????????????????????????????????????????????  EKG (per my interpretation):  Sinus bradycardia with a rate of 57.  No NJ QRS prolongation.  No obvious ST elevation or depression noted.  No YOSEPH.  QTc 430    ED Course  ED Course as of 04/03/25 1621   Thu Apr 03, 2025   1228 Right beating nystagmus [RR]      ED Course User Index  [RR] Amanda Gray MD       Medical Decision Making:  Lory Taylor is an 54 y.o. female with history including type 2 diabetes, CAD status post PCI, IIH, cardiomyopathy, chronic diastolic heart failure, sleep apnea, tinnitus presenting to the emergency department for lightheadedness and vertigo symptoms.  Differentials considered but not limited to worsening IIH, diastolic heart failure, ACS, stroke, acute anemia, UTI.     Based on the patient's history and physical exam broad workup was initiated including CTA of the  head and neck.  Patient did have V4 stenosis of 80% but otherwise has no obvious occlusion.  She had significant improvement of symptoms and resolution of nystagmus after receiving meclizine.  EKG and troponins were negative making my concern for ACS as the cause of her lightheadedness lower.  She is ambulatory.  Patient did want her urine checked despite declining any urinary frequency or urgency symptoms.  She has not had any changes to her acetazolamide dose.  Upon further questioning, patient reports this feels more consistent with her IIH but felt more severe in the acute setting.  Patient was told to follow-up with her neurologist and take meclizine as needed for dizziness.  She was also educated on peripheral causes of vertigo and given Epley maneuver instructions.  Patient was discharged in hemodynamically stable condition with strict return precautions discussed.  Care was overseen by attending physician agrees with the plan disposition.  We talked extensively about monitoring her blood pressure and keeping an appropriate log of her dizzy/lightheaded symptoms.  She was encouraged to follow-up with vestibular PT    External records reviewed: recent inpatient, clinic, and prior ED notes  Diagnostic imaging independently reviewed/interpreted by me (as reflected in MDM) includes: CTA Head and Neck  Social Determinants Affecting Care: Poor health literacy  Discussion of management with other providers: attending  Prescription Drug Consideration: meclizine  Escalation of Care: none    Impression:   Dizziness  Peripheral Vertigo    Disposition: Discharge      Procedures ? Smart[employer] last updated 4/3/2025 12:02 PM        Amanda Gray MD  Resident  04/03/25 9044

## 2025-04-03 NOTE — ED TRIAGE NOTES
Pt arrives to ED with c/o dizziness starting this morning. Pt states she feels just like when she had her MI in 2018. Pt reports that she has one stent. Denies chest pain, denies SOB.

## 2025-04-04 LAB — HOLD SPECIMEN: NORMAL

## 2025-04-06 DIAGNOSIS — I25.10 CORONARY ARTERY DISEASE INVOLVING NATIVE HEART WITHOUT ANGINA PECTORIS, UNSPECIFIED VESSEL OR LESION TYPE: ICD-10-CM

## 2025-04-06 RX ORDER — ASPIRIN 81 MG/1
81 TABLET ORAL DAILY
Qty: 90 TABLET | Refills: 1 | Status: SHIPPED | OUTPATIENT
Start: 2025-04-06

## 2025-04-08 ENCOUNTER — PATIENT OUTREACH (OUTPATIENT)
Dept: CARE COORDINATION | Facility: CLINIC | Age: 55
End: 2025-04-08
Payer: COMMERCIAL

## 2025-04-08 NOTE — PROGRESS NOTES
ED visit: [facility]  Admission: 4/3/2025  Discharge: 4/3/2025  Discharge Diagnosis: Dizziness    Outreach unsuccessful CM left VM with contact information.     JORGE DominguezN, RN CM  [phone]   ACO Department

## 2025-04-15 PROCEDURE — 93248 EXT ECG>7D<15D REV&INTERPJ: CPT | Performed by: INTERNAL MEDICINE

## 2025-04-17 ENCOUNTER — APPOINTMENT (OUTPATIENT)
Dept: OPHTHALMOLOGY | Facility: CLINIC | Age: 55
End: 2025-04-17
Payer: COMMERCIAL

## 2025-04-17 DIAGNOSIS — G93.2 IIH (IDIOPATHIC INTRACRANIAL HYPERTENSION): Primary | ICD-10-CM

## 2025-04-17 PROCEDURE — 99214 OFFICE O/P EST MOD 30 MIN: CPT | Performed by: PSYCHIATRY & NEUROLOGY

## 2025-04-17 PROCEDURE — 92133 CPTRZD OPH DX IMG PST SGM ON: CPT | Performed by: PSYCHIATRY & NEUROLOGY

## 2025-04-17 PROCEDURE — 92083 EXTENDED VISUAL FIELD XM: CPT | Performed by: PSYCHIATRY & NEUROLOGY

## 2025-04-17 RX ORDER — ACETAZOLAMIDE 250 MG/1
500 TABLET ORAL 2 TIMES DAILY
Qty: 120 TABLET | Refills: 3 | Status: SHIPPED | OUTPATIENT
Start: 2025-04-17 | End: 2025-08-15

## 2025-04-17 ASSESSMENT — VISUAL ACUITY
OD_CC: 20/20
METHOD: SNELLEN - LINEAR
OD_CC+: -1
OS_CC: 20/40
CORRECTION_TYPE: GLASSES

## 2025-04-17 ASSESSMENT — REFRACTION_WEARINGRX
OS_AXIS: 140
OD_SPHERE: +0.75
OD_CYLINDER: -1.50
OS_ADD: +2.00
OS_CYLINDER: -1.50
OD_AXIS: 009
SPECS_TYPE: PAL
OD_ADD: +2.00
OS_SPHERE: +0.50

## 2025-04-17 ASSESSMENT — CONF VISUAL FIELD
OD_NORMAL: 1
OD_SUPERIOR_TEMPORAL_RESTRICTION: 0
METHOD: COUNTING FINGERS
OS_INFERIOR_TEMPORAL_RESTRICTION: 0
OS_SUPERIOR_NASAL_RESTRICTION: 0
OS_SUPERIOR_TEMPORAL_RESTRICTION: 0
OS_INFERIOR_NASAL_RESTRICTION: 0
OD_INFERIOR_NASAL_RESTRICTION: 0
OD_SUPERIOR_NASAL_RESTRICTION: 0
OD_INFERIOR_TEMPORAL_RESTRICTION: 0
OS_NORMAL: 1

## 2025-04-17 ASSESSMENT — TONOMETRY
IOP_METHOD: GOLDMANN APPLANATION
OD_IOP_MMHG: 11
OD_IOP_MMHG: 17
OS_IOP_MMHG: 7
IOP_METHOD: TONOPEN
OS_IOP_MMHG: 10

## 2025-04-17 ASSESSMENT — ENCOUNTER SYMPTOMS
GASTROINTESTINAL NEGATIVE: 0
EYES NEGATIVE: 1
ALLERGIC/IMMUNOLOGIC NEGATIVE: 0
HEMATOLOGIC/LYMPHATIC NEGATIVE: 0
ENDOCRINE NEGATIVE: 1
PSYCHIATRIC NEGATIVE: 0
NEUROLOGICAL NEGATIVE: 0
MUSCULOSKELETAL NEGATIVE: 0
RESPIRATORY NEGATIVE: 0
CARDIOVASCULAR NEGATIVE: 0
CONSTITUTIONAL NEGATIVE: 0

## 2025-04-17 ASSESSMENT — SLIT LAMP EXAM - LIDS
COMMENTS: NORMAL
COMMENTS: NORMAL

## 2025-04-17 ASSESSMENT — CUP TO DISC RATIO
OD_RATIO: 0.2
OS_RATIO: 0.2

## 2025-04-17 ASSESSMENT — EXTERNAL EXAM - RIGHT EYE: OD_EXAM: NORMAL

## 2025-04-17 ASSESSMENT — EXTERNAL EXAM - LEFT EYE: OS_EXAM: NORMAL

## 2025-04-17 NOTE — PROGRESS NOTES
"Assessment and Plan    04/03/2025 CTA head & neck, which I personally reviewed, shows no aneurysm and by report \"1.  Negative cervical CTA for significant flow-limiting stenosis or dissection.  2. There is focal narrowing on the order of 80% involving the right  V4 segment.  3. Atherosclerotic changes are present within the cavernous segments  of the internal carotid arteries with areas of narrowing approaching  approximately 50%.\"    10/11/2024 CT head without contrast, which I personally reviewed previously, shows no lesion.  07/29/2024 MRV head, which I personally reviewed previously, shows distal transverse sinus narrowing.  05/23/2024 CT head without contrast, which I personally reviewed previously, shows   02/16/2024 ultrasound duplex carotid arteries, by report, shows no lesion.  05/21/2024 MRI brain with contrast, by report from [facility], shows \"No sellar, parasellar, or suprasellar mass.     Empty sella, which can be seen in idiopathic intracranial hypertension. Correlation with ophthalmologic examination is recommended. \"  Prior head imaging.    10/16/2024 lumbar puncture opening pressure 27 cm water, tube 1: RBC 88, WBC 21, tube 4: RBC 7 & WBC 1, protein 46 & glucose 73. Meningitis PCR panel negative.    04/17/2025 OCT RNFL OD 88 & OS 86. (Stable)  01/06/2025 OCT RNFL OD 89 & OS 86. (Stable)  09/24/2024 OCT RNFL OD 89 with borderline S thinning & OS 86 with borderline S thinning.    04/17/2025 HVF 24-2 OD fovea 47, FL 6/15, FP 9%, FN 8%, scatter, possible arcuate defects MD -3.52 & OS fovea 34, FL 1/15, FP 2%, FN 2%, general reduction MD -5.52.  01/06/2025 HVF 24-2 OD fovea 47, FL 6/15, FP 9%, FN 8%, scatter, possible arcuate defects MD -3.52 & OS fovea 34, general reduction MD -5.52.    This 54 year-old woman with a history of idiopathic intracranial hypertension, DM2, HTN, HLD, bilateral carotid stenosis, KATEY, obesity, CAD, CHF presents in follow up for evaluation of idiopathic intracranial " hypertension.    She seems stable with good trend in weight loss. Vision remains about the same with similar papilledema on funduscopy and OCT retinal nerve fiber layer. We discussed idiopathic intracranial hypertension treatment and possible change versus more time at current treatment.    Plan    Continue acetazolamide 500 mg PO BID.  Continue successful weight loss efforts.    Follow up in 3 months with HVF & OCT. (dilated 9/24/2024)

## 2025-04-22 ENCOUNTER — TELEPHONE (OUTPATIENT)
Facility: CLINIC | Age: 55
End: 2025-04-22
Payer: COMMERCIAL

## 2025-04-22 NOTE — TELEPHONE ENCOUNTER
Patient updated on recent holter results. Per Dr. Flores.. Overall her monitor is relatively normal.  Her average heart rate is in the normal range.  She does have a few PVCs and PACs but also in normal range.  She had some very short episodes of paroxysmal SVT only lasting about 3 beats.  Overall reassuring event monitor and we can discuss further in clinic. Patient verbalizes understanding and will follow up as scheduled on 6/10/25

## 2025-04-30 ENCOUNTER — APPOINTMENT (OUTPATIENT)
Dept: NEUROLOGY | Facility: CLINIC | Age: 55
End: 2025-04-30
Payer: COMMERCIAL

## 2025-04-30 VITALS
HEART RATE: 64 BPM | SYSTOLIC BLOOD PRESSURE: 130 MMHG | RESPIRATION RATE: 20 BRPM | DIASTOLIC BLOOD PRESSURE: 66 MMHG | TEMPERATURE: 97.7 F

## 2025-04-30 DIAGNOSIS — G93.2 IIH (IDIOPATHIC INTRACRANIAL HYPERTENSION): Primary | ICD-10-CM

## 2025-04-30 PROCEDURE — 99215 OFFICE O/P EST HI 40 MIN: CPT | Performed by: PSYCHIATRY & NEUROLOGY

## 2025-04-30 PROCEDURE — 3075F SYST BP GE 130 - 139MM HG: CPT | Performed by: PSYCHIATRY & NEUROLOGY

## 2025-04-30 PROCEDURE — 4010F ACE/ARB THERAPY RXD/TAKEN: CPT | Performed by: PSYCHIATRY & NEUROLOGY

## 2025-04-30 PROCEDURE — 3078F DIAST BP <80 MM HG: CPT | Performed by: PSYCHIATRY & NEUROLOGY

## 2025-04-30 RX ORDER — ACETAZOLAMIDE 250 MG/1
TABLET ORAL
Qty: 120 TABLET | Refills: 3 | Status: SHIPPED | OUTPATIENT
Start: 2025-04-30

## 2025-04-30 ASSESSMENT — PAIN SCALES - GENERAL: PAINLEVEL_OUTOF10: 5

## 2025-04-30 NOTE — ASSESSMENT & PLAN NOTE
Will try to increase diamox to 500 250 500. You are already experiencing fatigue if this worsens call me and we can try another approach. We can try hydrochlorothiazide if not then the GLP-1s like Ozempic that Dr Quesada recommended have been shown to increase the glial lymphatic drainage of the brain and contribute to lowering intracranial pressure.     I would like to get and MRV now that we assume your pressures are lower. To see if the MRV is improved.

## 2025-04-30 NOTE — ASSESSMENT & PLAN NOTE
DR Quesada recommended Jardiance and/or ozempic to help with her heart disease and DM2. This may also help with her IIH by helping with glympedema

## 2025-04-30 NOTE — PROGRESS NOTES
"Initial Symptoms of pressure headache improved with addition of Acetazolamide. Was a 10/10.   Now 6/10 on average.   Since acetazolamide 500 mg BID, has not seen any improvement in dizzy symptoms. Is causing fatigue at this dose.   Symptoms are not 24/7 but some time period every day. Worse from morning to 3pm then may start to feel better. Pressure is worse when lays flat . Pressure worse with bending, coughing, straining.   States she has lost weight. Currently 200lb and used to weight 240lb 1 year ago.   Changed diet. Decreased portion size. Did not eliminate any foods. Mostly healthy.   Symptoms:  Pressure headache, mostly concentrated frontal in face. Every once in a while while have stabbing pains throughout pain that come and go quickly.   Dizziness daily. Not severe but \"there\" certain times of the day, random.   Tinnitus 24/7 left ear. Able to sleep. Doesn't bother when sleeping.   Body aches, bilateral clavicles\"feels like my body is filling up with something\"    LP 10/16/24 had an OP of 27 and closing pressure of 16  Sleeping \"ok\" at night. Easily falls asleep. Wakes too early. Averages 6-7 hours.   Sleeps with HOB elevated. Pressure is worse when lays flat    Denies depression or anxiety. More just frustration about chronicity of symptoms.     Saw Mark Easton. 4- Was told pressure in her eyes was the same.   \"She seems stable with good trend in weight loss. Vision remains about the same with similar papilledema on funduscopy and OCT retinal nerve fiber layer. We discussed idiopathic intracranial hypertension treatment and possible change versus more time at current treatment. \"    Went to ER on 4/3/25 for dizzy episode and had a CTA.    IMPRESSION:  1.  Negative cervical CTA for significant flow-limiting stenosis or  dissection.  2. There is focal narrowing on the order of 80% involving the right  V4 segment.  3. Atherosclerotic changes are present within the cavernous segments  of the internal " carotid arteries with areas of narrowing approaching  approximately 50%.     Latest Reference Range & Units 04/03/25 11:26   GLUCOSE 74 - 99 mg/dL 150 (H)   SODIUM 136 - 145 mmol/L 135 (L)   POTASSIUM 3.5 - 5.3 mmol/L 4.4   CHLORIDE 98 - 107 mmol/L 108 (H)   Bicarbonate 21 - 32 mmol/L 20 (L)   Anion Gap 10 - 20 mmol/L 11   Blood Urea Nitrogen 6 - 23 mg/dL 22   Creatinine 0.50 - 1.05 mg/dL 0.95   EGFR >60 mL/min/1.73m*2 72   Calcium 8.6 - 10.3 mg/dL 8.6   Albumin 3.4 - 5.0 g/dL 3.7   Alkaline Phosphatase 33 - 110 U/L 70   ALT 7 - 45 U/L 13   AST 9 - 39 U/L 16   Bilirubin Total 0.0 - 1.2 mg/dL 0.4   (H): Data is abnormally high  (L): Data is abnormally low    7/2024 MRV of brain  IMPRESSION:  * Symmetrical signal loss at the junction of the transverse and  sigmoid sinuses bilaterally may represent artifact; however, given  empty sella, the finding also correlates with increased intracranial  pressure.        To review what we discussed today   Assessment/Plan   Problem List Items Addressed This Visit       IIH (idiopathic intracranial hypertension) - Primary    Will try to increase diamox to 500 250 500. You are already experiencing fatigue if this worsens call me and we can try another approach. We can try hydrochlorothiazide if not then the GLP-1s like Ozempic that Dr Quesada recommended have been shown to increase the glial lymphatic drainage of the brain and contribute to lowering intracranial pressure.     I would like to get and MRV now that we assume your pressures are lower. To see if the MRV is improved.          Relevant Medications    acetaZOLAMIDE (Diamox) 250 mg tablet    Other Relevant Orders    MR venography intracranial wo IV contrast       Your next appointment with me  3months    Thank you for visiting the office of Dr Priti Lr. It was a pleasure working with you today.   [address]ie L[address] Mon-Thursday  [facility] 5th floor [facility] Fridays  Our office phone number is [phone]. You  can use this number to leave messages for Aimee or to schedule or reschedule an appointment or request refills.  If you were seen on Thursday afternoon or Friday our office will call on Monday or Tuesday to reschedule your appointment. If you do not receive a call please call us.   We are also available for messages on my chart. We make every effort to respond to your concerns by the end of the next business day.

## 2025-05-03 DIAGNOSIS — I10 PRIMARY HYPERTENSION: ICD-10-CM

## 2025-05-03 RX ORDER — HYDRALAZINE HYDROCHLORIDE 25 MG/1
25 TABLET, FILM COATED ORAL 2 TIMES DAILY
Qty: 180 TABLET | Refills: 1 | Status: SHIPPED | OUTPATIENT
Start: 2025-05-03

## 2025-05-16 ENCOUNTER — HOSPITAL ENCOUNTER (OUTPATIENT)
Dept: RADIOLOGY | Facility: CLINIC | Age: 55
Discharge: HOME | End: 2025-05-16
Payer: COMMERCIAL

## 2025-05-16 DIAGNOSIS — G93.2 IIH (IDIOPATHIC INTRACRANIAL HYPERTENSION): ICD-10-CM

## 2025-05-16 PROCEDURE — 70545 MR ANGIOGRAPHY HEAD W/DYE: CPT

## 2025-05-16 PROCEDURE — A9575 INJ GADOTERATE MEGLUMI 0.1ML: HCPCS | Performed by: PSYCHIATRY & NEUROLOGY

## 2025-05-16 PROCEDURE — 2550000001 HC RX 255 CONTRASTS: Performed by: PSYCHIATRY & NEUROLOGY

## 2025-05-16 RX ORDER — GADOTERATE MEGLUMINE 376.9 MG/ML
19 INJECTION INTRAVENOUS
Status: COMPLETED | OUTPATIENT
Start: 2025-05-16 | End: 2025-05-16

## 2025-05-16 RX ADMIN — GADOTERATE MEGLUMINE 19 ML: 376.9 INJECTION INTRAVENOUS at 16:12

## 2025-06-02 ENCOUNTER — APPOINTMENT (OUTPATIENT)
Dept: INTEGRATIVE MEDICINE | Facility: CLINIC | Age: 55
End: 2025-06-02
Payer: COMMERCIAL

## 2025-06-02 VITALS
BODY MASS INDEX: 37.74 KG/M2 | SYSTOLIC BLOOD PRESSURE: 125 MMHG | HEIGHT: 63 IN | DIASTOLIC BLOOD PRESSURE: 76 MMHG | OXYGEN SATURATION: 99 % | HEART RATE: 63 BPM | WEIGHT: 213 LBS

## 2025-06-02 DIAGNOSIS — E11.65 TYPE 2 DIABETES MELLITUS WITH HYPERGLYCEMIA, WITH LONG-TERM CURRENT USE OF INSULIN: ICD-10-CM

## 2025-06-02 DIAGNOSIS — E66.01 MORBID OBESITY (MULTI): ICD-10-CM

## 2025-06-02 DIAGNOSIS — Z79.4 TYPE 2 DIABETES MELLITUS WITH HYPERGLYCEMIA, WITH LONG-TERM CURRENT USE OF INSULIN: ICD-10-CM

## 2025-06-02 DIAGNOSIS — G93.2 IIH (IDIOPATHIC INTRACRANIAL HYPERTENSION): Primary | ICD-10-CM

## 2025-06-02 DIAGNOSIS — Z78.0 MENOPAUSE: ICD-10-CM

## 2025-06-02 PROCEDURE — 99417 PROLNG OP E/M EACH 15 MIN: CPT | Performed by: INTERNAL MEDICINE

## 2025-06-02 PROCEDURE — 99215 OFFICE O/P EST HI 40 MIN: CPT | Performed by: INTERNAL MEDICINE

## 2025-06-02 RX ORDER — UBIDECARENONE 30 MG
30 CAPSULE ORAL DAILY
COMMUNITY

## 2025-06-02 ASSESSMENT — ENCOUNTER SYMPTOMS
HYPERTENSION: 1
DIARRHEA: 0
APPETITE CHANGE: 0
SLEEP DISTURBANCE: 1
ABDOMINAL DISTENTION: 0
SINUS PAIN: 1
FATIGUE: 1
ARTHRALGIAS: 0
NECK PAIN: 1
HEADACHES: 1
DYSURIA: 0
DIFFICULTY URINATING: 0
NERVOUS/ANXIOUS: 1
FEVER: 0
COUGH: 0
WEAKNESS: 0
MYALGIAS: 0
SHORTNESS OF BREATH: 0
SINUS PRESSURE: 1
BACK PAIN: 0
CONSTIPATION: 0

## 2025-06-02 NOTE — PATIENT INSTRUCTIONS
Please try to go to bed earlier. Suggest having a sleep opportunity of 8 hours. Would try going to bed around 9:30 pm and see if this allows you to sleep longer. May need to go to bed as early as 9 pm.   Turn the TV off at bedtime. Suggest that TV's should not be in the bedroom.  When watching TV in the living room, turn the tv off prior to falling asleep.   97% of people get enough protein in their diet. You do not need protein powders. Only 3% of people get enough fiber in their diet. Please eat enough fiber in your diet from food.   Measure one day how much fiber you consume. Aim to get at least 30 grams of fiber per day. Use the handout that I gave you to determine roughly how much fiber you are eating.   Recommend consuming beans or lentils daily.   Read the plant based food as medicine guide and see me back in July 2025  Work on getting enough sleep.   Keep up the exercise. Measure your steps to determine how much you are walking. Aim to get 10,000 steps per day eventually. Move this up over months.   Zabrina Narayan MD PhD

## 2025-06-02 NOTE — PROGRESS NOTES
Integrative Medicine Visit:     Subjective   Patient ID: Lory Taylor is a 54 y.o. female who presents for consult and Hypertension       Hypertension  Associated symptoms include headaches and neck pain (in back of head and neck from [facility]). Pertinent negatives include no chest pain or shortness of breath.     She was diagnosed with intracranial hypertension. She is on diamox for this. She is trying to lose more weight. She is trying to exercise more. She does not want to be on any more medications. She is ok with supplements.   When she walks she has an increase in her blood pressure. She says she could feel it- her blood pressure would raise when she would get home.    Tries really hard to lose weight but she cannot. She does not know why she cannot lose weight. Seems like the more she exercises the more she weighs.   She is post menopausal and last period was about 3 years ago.   It took 3-4 years to diagnose her intracranial hypertension- had headaches in her eyes and ringging in her ears, nausea.  Had not head headaches in the past. She went to ENT and they did not diagnose this until seeing neurology. Had normal ct of sinuses. Went to another ENT because she had an abnormal finding which needed further imaging with an MRI.   Hx of sleep apnea. Had a sleep study and apparently she does not need this anymore.   Taking acetazolamide 500 mg twice daily by mouth and has a side effect of dizziness. Went to ER recently because she felt like her blood pressure was high and her whole body hurts. Feeling dizzy.   Had normal echocardiogram- had a heart attack in 2018.   CONCERNS:  wants to improve her headaches and her blood pressure issues.   Lab Results   Component Value Date    TSH 2.74 03/17/2025      Lab Results   Component Value Date    HGBA1C 6.3 (H) 03/17/2025     Lab Results   Component Value Date    WBC 5.3 04/03/2025    HGB 13.4 04/03/2025    HCT 40.6 04/03/2025    MCV 94 04/03/2025     04/03/2025     "No results found for: \"[id number]\"   Lab Results   Component Value Date    VITD25 26 (A) 2021         PMH:  intracranial hypertension  Hypertension  Pre-diabetes- not on medicine has had a hga1c as high as 7.5 in the past.   High cholesterol.   Obesity  Heart attack. - she does not know why she got this.     [facility]:  Family History[1]   Mom  of lung cancer.     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 Stressful. . Has a step daughter.     NUTRITION: breakfast: protein shake with rosanna seed and flax seed milk or will have eggs and turkey or chicken for breakfast. Likes ezikiel bread. Drinks water or tea for breakfast. Tammy tea. Or decaf green tea  Dinner: turkey with greens. Brussel sprouts, cauliflower.     Smoking:  non-smoker    Alcohol use:  none; no issues with alcohol in the past.     Exercise:  she exercises regularly.  Has a mini stepper that she uses for 15-20 minutes daily. She walks in the house. She does not measure her steps so is not sure how much she walks. Uses 5 lb weights to do light arm exercises. Too much exercise seems to worsen her intracranial hypertension.      SLEEP: was diagnosed with francisco javier but not on cpap now. Goes to bed between 10-11 pm. Keeps waking around 4 am. Tries to go back to sleep but cannot.  gets up at 5:20 am. Can sleep til 7 am. Does not start work til 10 am. She wakes and worries about things.  Does not have trouble falling asleep. Sometimes naps for a couple hours. Falls asleep watching tv.     STRESS MANAGEMENT: does not think she does much except exercise and read the bible. Time on the phone about her [employer] life.   SUPPORT: . [employer]. Sister. Father.     Review of Systems   Constitutional:  Positive for fatigue. Negative for appetite change and fever.   HENT:  Positive for sinus pressure and sinus pain. Negative for congestion and hearing loss.    Eyes:  Negative for visual " "disturbance.   Respiratory:  Negative for cough and shortness of breath.    Cardiovascular:  Negative for chest pain and leg swelling.   Gastrointestinal:  Negative for abdominal distention, constipation and diarrhea.   Genitourinary:  Negative for difficulty urinating, dysuria and urgency.   Musculoskeletal:  Positive for neck pain (in back of head and neck from IIH). Negative for arthralgias, back pain and myalgias.   Skin:  Negative for rash.   Neurological:  Positive for headaches. Negative for weakness.   Psychiatric/Behavioral:  Positive for sleep disturbance. Negative for behavioral problems. The patient is nervous/anxious.             Pain:    Objective   /76 (BP Location: Left arm, Patient Position: Sitting, BP Cuff Size: Adult)   Pulse 63   Ht 1.6 m (5' 3\")   Wt 96.6 kg (213 lb)   SpO2 99%   BMI 37.73 kg/m²       Physical Exam  HENT:      Head: Normocephalic and atraumatic.      Mouth/Throat:      Mouth: Mucous membranes are moist.   Cardiovascular:      Rate and Rhythm: Normal rate.   Pulmonary:      Effort: Pulmonary effort is normal. No respiratory distress.   Musculoskeletal:         General: No swelling or deformity.      Cervical back: Normal range of motion.   Skin:     General: Skin is dry.      Findings: No rash.   Neurological:      General: No focal deficit present.      Mental Status: She is alert and oriented to person, place, and time.   Psychiatric:      Comments: Normal affect                      Assessment/Plan     Problem List Items Addressed This Visit           ICD-10-CM    Morbid obesity (Multi) E66.01    Type 2 diabetes mellitus with hyperglycemia (Multi) E11.65    IIH (idiopathic intracranial hypertension) - Primary G93.2     Other Visit Diagnoses         Codes      Menopause     Z78.0           Please try to go to bed earlier. Suggest having a sleep opportunity of 8 hours. Would try going to bed around 9:30 pm and see if this allows you to sleep longer. May need to go to " bed as early as 9 pm.   Turn the TV off at bedtime. Suggest that TV's should not be in the bedroom.  When watching TV in the living room, turn the tv off prior to falling asleep.   97% of people get enough protein in their diet. You do not need protein powders. Only 3% of people get enough fiber in their diet. Please eat enough fiber in your diet from food.   Measure one day how much fiber you consume. Aim to get at least 30 grams of fiber per day. Use the handout that I gave you to determine roughly how much fiber you are eating.   Recommend consuming beans or lentils daily.   Read the plant based food as medicine guide and see me back in July 2025  Work on getting enough sleep.   Keep up the exercise. Measure your steps to determine how much you are walking. Aim to get 10,000 steps per day eventually. Move this up over months.       Recommend Follow up in : 2 months.     Zabrina Narayan MD PhD    Time Spent  Prep time on day of patient encounter: 3 minutes  Time spent directly with patient, family or caregiver: 55 minutes  Additional Time Spent on Patient Care Activities: 0 minutes  Documentation Time: 5 minutes  Other Time Spent: 0 minutes  Total: 63 minutes                             [1]   Family History  Problem Relation Name Age of Onset    Glaucoma Neg Hx      Macular degeneration Neg Hx

## 2025-06-03 ENCOUNTER — APPOINTMENT (OUTPATIENT)
Dept: CARDIOLOGY | Facility: CLINIC | Age: 55
End: 2025-06-03
Payer: COMMERCIAL

## 2025-06-04 ENCOUNTER — APPOINTMENT (OUTPATIENT)
Dept: INTEGRATIVE MEDICINE | Facility: CLINIC | Age: 55
End: 2025-06-04
Payer: COMMERCIAL

## 2025-06-10 ENCOUNTER — APPOINTMENT (OUTPATIENT)
Dept: CARDIOLOGY | Facility: CLINIC | Age: 55
End: 2025-06-10
Payer: COMMERCIAL

## 2025-06-10 VITALS
WEIGHT: 210 LBS | OXYGEN SATURATION: 99 % | HEIGHT: 63 IN | SYSTOLIC BLOOD PRESSURE: 122 MMHG | BODY MASS INDEX: 37.21 KG/M2 | HEART RATE: 59 BPM | DIASTOLIC BLOOD PRESSURE: 61 MMHG

## 2025-06-10 DIAGNOSIS — I11.9 HYPERTENSIVE HEART DISEASE WITHOUT CONGESTIVE HEART FAILURE: ICD-10-CM

## 2025-06-10 DIAGNOSIS — I65.23 OCCLUSION AND STENOSIS OF BILATERAL CAROTID ARTERIES: ICD-10-CM

## 2025-06-10 DIAGNOSIS — I25.10 CAD S/P PERCUTANEOUS CORONARY ANGIOPLASTY: Primary | ICD-10-CM

## 2025-06-10 DIAGNOSIS — R00.2 PALPITATIONS: ICD-10-CM

## 2025-06-10 DIAGNOSIS — E11.9 TYPE 2 DIABETES MELLITUS WITHOUT COMPLICATION, UNSPECIFIED WHETHER LONG TERM INSULIN USE: ICD-10-CM

## 2025-06-10 DIAGNOSIS — I47.10 SUPRAVENTRICULAR TACHYCARDIA, UNSPECIFIED: ICD-10-CM

## 2025-06-10 DIAGNOSIS — I50.20 ACC/AHA STAGE C SYSTOLIC HEART FAILURE: ICD-10-CM

## 2025-06-10 DIAGNOSIS — R42 DIZZINESS: ICD-10-CM

## 2025-06-10 DIAGNOSIS — E78.2 MIXED HYPERLIPIDEMIA: ICD-10-CM

## 2025-06-10 DIAGNOSIS — G47.33 OSA (OBSTRUCTIVE SLEEP APNEA): ICD-10-CM

## 2025-06-10 DIAGNOSIS — I10 PRIMARY HYPERTENSION: ICD-10-CM

## 2025-06-10 DIAGNOSIS — I25.10 CORONARY ARTERY DISEASE INVOLVING NATIVE CORONARY ARTERY OF NATIVE HEART WITHOUT ANGINA PECTORIS: ICD-10-CM

## 2025-06-10 DIAGNOSIS — Z98.61 CAD S/P PERCUTANEOUS CORONARY ANGIOPLASTY: Primary | ICD-10-CM

## 2025-06-10 PROCEDURE — 99212 OFFICE O/P EST SF 10 MIN: CPT | Performed by: STUDENT IN AN ORGANIZED HEALTH CARE EDUCATION/TRAINING PROGRAM

## 2025-06-10 PROCEDURE — 3008F BODY MASS INDEX DOCD: CPT | Performed by: STUDENT IN AN ORGANIZED HEALTH CARE EDUCATION/TRAINING PROGRAM

## 2025-06-10 PROCEDURE — 3078F DIAST BP <80 MM HG: CPT | Performed by: STUDENT IN AN ORGANIZED HEALTH CARE EDUCATION/TRAINING PROGRAM

## 2025-06-10 PROCEDURE — 4010F ACE/ARB THERAPY RXD/TAKEN: CPT | Performed by: STUDENT IN AN ORGANIZED HEALTH CARE EDUCATION/TRAINING PROGRAM

## 2025-06-10 PROCEDURE — 99214 OFFICE O/P EST MOD 30 MIN: CPT | Performed by: STUDENT IN AN ORGANIZED HEALTH CARE EDUCATION/TRAINING PROGRAM

## 2025-06-10 PROCEDURE — 1036F TOBACCO NON-USER: CPT | Performed by: STUDENT IN AN ORGANIZED HEALTH CARE EDUCATION/TRAINING PROGRAM

## 2025-06-10 PROCEDURE — 3074F SYST BP LT 130 MM HG: CPT | Performed by: STUDENT IN AN ORGANIZED HEALTH CARE EDUCATION/TRAINING PROGRAM

## 2025-06-10 ASSESSMENT — ENCOUNTER SYMPTOMS
OCCASIONAL FEELINGS OF UNSTEADINESS: 0
DEPRESSION: 0
LOSS OF SENSATION IN FEET: 0

## 2025-06-10 ASSESSMENT — PATIENT HEALTH QUESTIONNAIRE - PHQ9
SUM OF ALL RESPONSES TO PHQ9 QUESTIONS 1 AND 2: 0
1. LITTLE INTEREST OR PLEASURE IN DOING THINGS: NOT AT ALL
2. FEELING DOWN, DEPRESSED OR HOPELESS: NOT AT ALL

## 2025-06-10 ASSESSMENT — PAIN SCALES - GENERAL: PAINLEVEL_OUTOF10: 0-NO PAIN

## 2025-06-10 NOTE — PATIENT INSTRUCTIONS
Please continue current cardiac medications including acetazolamide 500 mg twice daily, aspirin 81 mg, atorvastatin 80 mg daily, hydralazine 25 mg twice daily, lisinopril 20 mg twice daily, metoprolol succinate 100 mg daily, sublingual nitroglycerin as needed.    Please followup with me in Cardiology clinic within the next 8months.  Please return to clinic sooner or seek emergent care if your symptoms reoccur or worsen.

## 2025-06-10 NOTE — PROGRESS NOTES
Follow-up visit    HPI:    Lory Taylor is a 54 y.o. female with pertinent history of sleep apnea, tinnitus, type 2 diabetes, idiopathic intracranial hypertension/ pseudotumor cerebri, obstructive coronary artery disease status post PCI to LAD June 2018 with history of recovered ischemic cardiomyopathy, history acute on chronic diastolic heart failure, no obstructive disease on carotid Dopplers performed 2/16/2024, preserved LVEF 67% with grade 2 diastolic dysfunction, moderate left atrial enlargement, mild aortic regurgitation on echo performed 2/11/2025, average heart rate of 63 bpm with 2 episodes of paroxysmal supraventricular tachycardia up to 3 beats on event monitor performed March 2025 presents to cardiology clinic for follow up.     She is doing relatively well.  Blood pressure has been well-controlled.  She did have an emergency room visit for dizziness that was reviewed and discussed.  She is compliant with her medications.  Dyspnea exertion is stable.  No significant chest pain episodes.  We also reviewed her recent echo in detail.  No exacerbating or relieving factors.   Pt denies orthopnea, and paroxysmal nocturnal dyspnea.  Pt denies worsening lower extremity edema.  Pt denies palpitations or syncope.  No recent falls.  No fever or chills.  No cough.  No change in bowel or bladder habits.  No sick contacts.  No recent travel.    12 point review of systems including (Constitutional, Eyes, ENMT, Respiratory, Cardiac, Gastrointestinal, Neurological, Psychiatric, and Hematologic) was performed and is otherwise negative.    Past medical history:  As above.    Medications were reviewed.    Allergies were reviewed.    Family history:  No sudden cardiac death or premature coronary artery disease.     Social history reviewed:   reports that she has never smoked. She has never been exposed to tobacco smoke. She has never used smokeless tobacco. She reports that she does not drink alcohol and does not use  drugs.     Allergies reviewed: Penicillins     Medications reviewed:   Current Outpatient Medications   Medication Instructions    acetaZOLAMIDE (Diamox) 250 mg tablet 2 in the AM 1 noon and 2 at bedtime.    aspirin 81 mg, oral, Daily    atorvastatin (LIPITOR) 80 mg, oral, Daily    BERBERINE CHLORIDE ORAL Take by mouth.    biotin 5 mg, Daily    cholecalciferol (VITAMIN D-3) 25 mcg, Daily    co-enzyme Q-10 30 mg, Daily    hydrALAZINE (APRESOLINE) 25 mg, oral, 2 times daily    lisinopril 20 mg, oral, 2 times daily    magnesium 200 mg tablet 1 tablet, Daily    metoprolol succinate XL (TOPROL-XL) 100 mg, oral, Daily    nitroglycerin (NITROSTAT) 0.4 mg, Every 5 min PRN    NON FORMULARY Miko calm    taurine 500 mg capsule 500 capsules, Daily        Vitals reviewed: Visit Vitals  /61 (BP Location: Left arm, Patient Position: Sitting, BP Cuff Size: Adult)   Pulse 59           Physical Exam:   General:  Patient is awake, alert, and oriented.  Patient is in no acute distress.  HEENT:  Pupils equal and reactive.  Normocephalic.  Moist mucosa.    Neck:  No thyromegaly.  Normal Jugular Venous Pressure.  Cardiovascular:  Regular rate and rhythm.  Normal S1 and S2.  1/6 MATTHEW.  Pulmonary:  Clear to auscultation bilaterally.  Abdomen:  Soft. Non-tender.   Non-distended.  Positive bowel sounds.  Lower Extremities:  2+ pedal pulses. No LE edema.  Neurologic:  Cranial nerves intact.  No focal deficit.   Skin: Skin warm and dry, normal skin turgor.   Psychiatric: Normal affect.    Last Labs:  CBC -      Lab Results   Component Value Date    WBC 5.3 04/03/2025    WBC 5.6 03/17/2025    HGB 13.4 04/03/2025    HGB 13.8 03/17/2025    HCT 40.6 04/03/2025    HCT 43.0 03/17/2025     04/03/2025     03/17/2025        CMP-  Lab Results   Component Value Date    GLUCOSE 150 (H) 04/03/2025    GLUCOSE 131 (H) 03/17/2025     (L) 04/03/2025     03/17/2025    K 4.4 04/03/2025    K 3.5 03/17/2025     (H) 04/03/2025      03/17/2025    CO2 20 (L) 04/03/2025    CO2 20 03/17/2025    ANIONGAP 11 04/03/2025    ANIONGAP 9 03/17/2025    BUN 22 04/03/2025    BUN 19 03/17/2025    CREATININE 0.95 04/03/2025    CREATININE 0.85 03/17/2025    EGFR 72 04/03/2025    EGFR 82 03/17/2025    CALCIUM 8.6 04/03/2025    CALCIUM 8.8 03/17/2025    PHOS 2.6 06/28/2018    PROT 6.9 04/03/2025    PROT 6.6 03/17/2025    ALBUMIN 3.7 04/03/2025    ALBUMIN 4.1 03/17/2025    AST 16 04/03/2025    AST 17 03/17/2025    ALT 13 04/03/2025    ALT 15 03/17/2025    ALKPHOS 70 04/03/2025    ALKPHOS 81 03/17/2025    BILITOT 0.4 04/03/2025    BILITOT 0.5 03/17/2025        LIPIDS-  Lab Results   Component Value Date    CHOL 107 03/17/2025    TRIG 41 03/17/2025    HDL 40 (L) 03/17/2025    CHHDL 2.7 03/17/2025    VLDL 9 09/16/2024        OTHERS-  Lab Results   Component Value Date    HGBA1C 6.3 (H) 03/17/2025    BNP 30 11/28/2023        I personally reviewed the patient's recent vitals, labs, medications, orders, EKGs, pertinent cardiac imaging/ echocardiography and ischemic evaluations including stress testing/ cardiac catheterization.    Assessment and Plan:    Lory Taylor is a 54 y.o. female with pertinent history of sleep apnea, tinnitus, type 2 diabetes, idiopathic intracranial hypertension/ pseudotumor cerebri, obstructive coronary artery disease status post PCI to LAD June 2018 with history of recovered ischemic cardiomyopathy, history acute on chronic diastolic heart failure, no obstructive disease on carotid Dopplers performed 2/16/2024, preserved LVEF 67% with grade 2 diastolic dysfunction, moderate left atrial enlargement, mild aortic regurgitation on echo performed 2/11/2025, average heart rate of 63 bpm with 2 episodes of paroxysmal supraventricular tachycardia up to 3 beats on event monitor performed March 2025 presents to cardiology clinic for follow up.   She is doing relatively well.  Blood pressure has been well-controlled.  She did have an  emergency room visit for dizziness that was reviewed and discussed.  She is compliant with her medications.  Dyspnea exertion is stable.  No significant chest pain episodes.  We also reviewed her recent echo in detail.      Please continue current cardiac medications including acetazolamide 500 mg twice daily, aspirin 81 mg, atorvastatin 80 mg daily, hydralazine 25 mg twice daily, lisinopril 20 mg twice daily, metoprolol succinate 100 mg daily, sublingual nitroglycerin as needed.    Please followup with me in Cardiology clinic within the next 8months.  Please return to clinic sooner or seek emergent care if your symptoms reoccur or worsen.    Thank you for allowing me to participate in their care.  Please feel free to call me with any further questions or concerns.        Jonah Flores MD, FACC, SUZIE HAGER  Division of Cardiovascular Medicine  Medical Director, [facility] [place]  Clinical , [facility] [employer]  [email or web address]   Office:  [phone]

## 2025-06-17 ENCOUNTER — APPOINTMENT (OUTPATIENT)
Dept: OTOLARYNGOLOGY | Facility: CLINIC | Age: 55
End: 2025-06-17
Payer: COMMERCIAL

## 2025-06-17 VITALS — WEIGHT: 210 LBS | BODY MASS INDEX: 37.2 KG/M2

## 2025-06-17 DIAGNOSIS — R09.81 NASAL CONGESTION: ICD-10-CM

## 2025-06-17 DIAGNOSIS — G93.2 IIH (IDIOPATHIC INTRACRANIAL HYPERTENSION): ICD-10-CM

## 2025-06-17 DIAGNOSIS — R44.8 FACIAL PRESSURE: ICD-10-CM

## 2025-06-17 DIAGNOSIS — J34.2 DEVIATED NASAL SEPTUM: Primary | ICD-10-CM

## 2025-06-17 DIAGNOSIS — J34.89 NASAL AND SINUS DISCHARGE: ICD-10-CM

## 2025-06-17 DIAGNOSIS — J34.89 SINUS DRAINAGE: ICD-10-CM

## 2025-06-17 PROCEDURE — 99214 OFFICE O/P EST MOD 30 MIN: CPT | Performed by: OTOLARYNGOLOGY

## 2025-06-17 PROCEDURE — 31231 NASAL ENDOSCOPY DX: CPT | Performed by: OTOLARYNGOLOGY

## 2025-06-17 PROCEDURE — 4010F ACE/ARB THERAPY RXD/TAKEN: CPT | Performed by: OTOLARYNGOLOGY

## 2025-06-17 RX ORDER — TRIAMCINOLONE ACETONIDE 55 UG/1
2 SPRAY, METERED NASAL DAILY
Qty: 49.5 G | Refills: 0 | Status: SHIPPED | OUTPATIENT
Start: 2025-06-17 | End: 2026-06-17

## 2025-06-17 RX ORDER — IPRATROPIUM BROMIDE 21 UG/1
2 SPRAY, METERED NASAL 3 TIMES DAILY
Qty: 30 ML | Refills: 3 | Status: SHIPPED | OUTPATIENT
Start: 2025-06-17

## 2025-06-17 NOTE — PROGRESS NOTES
Sinus & Skull Base Surgery    Chief Complaint:  1.  Sinonasal symptoms associated with eating and drinking  2.  Nasal airway obstruction; deviated septum to the left  3.  Facial pain and pressure, headaches -- improving   4.  Obstructive sleep apnea on positive pressure  5.  Empty sella turcica    History Of Present Illness:  Lory Taylor presents since last being seen 12/10/2024.     She still has good days and bad days in regard to her sinonasal symptoms most notably facial pressure.  Symptoms can be made worse with eating or drinking.  She can have days that she wakes up and feels well but has symptoms during the day.  In a given month about 2-1/2 weeks are good and she will have symptoms about 1-1/2 weeks.    She has been following with Dr. Lr with neurology and was diagnosed as having idiopathic intracranial hypertension and is on Diamox.    She is not currently using any intranasal medical therapy.  She did use Azelastine without significant change in her symptoms.    Main Symptoms:  Patient has anterior nasal drainage.  Intermittent.   Patient does not have posterior nasal drainage.  Patient has nasal airway obstruction.  Occasionally.   Patient does not have facial pain.  Patient has facial pressure.  Patient does not have decreased sense of smell.   Associated Symptoms:  Patient does not have headaches.  Patient has throat clearing.  Patient does not have coughing.  Patient does not have dysphonia.  Patient does not have nasal bleeding.    Medications currently on for sinonasal symptoms:  None.    Active Problems:  Problem List[1]    Past Medical History:  She has a past medical history of Cataract and Diabetes mellitus (Multi).    Surgical History:  She has no past surgical history on file.    Family History:  Family History[2]    Social History:  She reports that she has never smoked. She has never been exposed to tobacco smoke. She has never used smokeless tobacco. She reports that she  does not drink alcohol and does not use drugs.    Allergies:  Penicillins    Current Meds:  Current Medications[3]    Vitals:  Visit Vitals  Wt 95.3 kg (210 lb)   BMI 37.20 kg/m²   OB Status Postmenopausal   Smoking Status Never   BSA 2.06 m²     Physical Exam:  Nose: On external exam there are neither lesions nor asymmetry of the nasal tip/dorsum. On anterior rhinoscopy, visualization posteriorly is limited on anterior examination. For this reason, to adequately evaluate posteriorly for masses, source of epistaxis, polypoid disease, debridement, and/or signs of infections, nasal endoscopy is indicated.  (Please see procedure below.)    SINONASAL ENDOSCOPY (CPT 36095): To better evaluate the patient's symptoms, sinonasal endoscopy is indicated.  After discussion of risks and benefits, and topical decongestion and anesthesia, an endoscope was used to perform nasal endoscopy on each side.  A time out identifying the patient, the procedure, the location of the procedure and any concerns was performed prior to beginning the procedure.    Findings:  Examination of the right nasal cavity revealed a normal middle meatus and sphenoethmoid recess. Examination of the left nasal cavity revealed a normal middle meatus. The septum was deviated to the left. I could not visualize the sphenoethmoid recess due to the septal deviation.     Results:  I reviewed her last note from Dr. Lr from April 30, 2025 outlining management for headaches and idiopathic intracranial hypertension.  Dr. Lr had ordered MRI venography of her brain and there were no abnormalities on that study.  The study was conducted May 16, 2025.  The report is listed below.    IMPRESSION:  1. Comparison to the prior MR venogram demonstrates that the transverse and sigmoid dural sinuses are mildly more robust.  2. The dural sinuses and major draining cortical veins are patent.    Provider Impressions:  1.  Sinonasal symptoms associated with eating and drinking  2.   Nasal airway obstruction; deviated septum to the left  3.  Facial pressure   4.  Obstructive sleep apnea on positive pressure  5.  Empty sella turcica; idiopathic intracranial hypertension on Diamox following with neurology  6.  Rhinorrhea  7.  Throat clearing    Discussion:  Lory Taylor and I discussed her exam and symptoms together today.  I suggested trials of additional intranasal medical therapy and she was comfortable with this.    Medication started today: Triamcinolone, ipratropium    I recommended staggered trials of these therapies.  If she derives benefit with each they can be used concurrently.  I would not recommend another trial of Azelastine given lack of prior benefit.    We also discussed her previous sinonasal imaging.  Her most recent study was a CT angio of the head and neck that was obtained April 3, 2025.  There were inflammatory changes on the floor of each maxillary sinus more significant on the left.  The remainder of her sinuses appear clear on that study.    I asked her to follow-up virtually in about 2 to 3 months.  She was amenable to this and all questions were answered.      Between face-to-face contact, review of the medical record, and documentation I spent 32 minutes on this evaluation during the day of service.    Scribe Attestation  By signing my name below, I, Matthew Mclaughlin, attest that this documentation has been prepared under the direction and in the presence of Darin Murrell MD.    Signature:  Darin Murrell MD       [1]   Patient Active Problem List  Diagnosis    Type 2 diabetes mellitus without complication    Angina pectoris    Coronary artery disease without angina pectoris    Hypomagnesemia    Mixed hyperlipidemia    Hypertensive heart disease without congestive heart failure    Intermittent claudication of both lower extremities due to atherosclerosis    Morbid obesity (Multi)    Occlusion and stenosis of bilateral carotid arteries    ACC/AHA  stage C systolic heart failure    CAD S/P percutaneous coronary angioplasty    Combined form of age-related cataract, both eyes    HTN (hypertension)    Imbalance    Snoring    Tinnitus    KATEY (obstructive sleep apnea)    Encounter for screening mammogram for malignant neoplasm of breast    Prediabetes    Type 2 diabetes mellitus with hyperglycemia (Multi)    IIH (idiopathic intracranial hypertension)   [2]   Family History  Problem Relation Name Age of Onset    Glaucoma Neg Hx      Macular degeneration Neg Hx     [3]   Current Outpatient Medications:     acetaZOLAMIDE (Diamox) 250 mg tablet, 2 in the AM 1 noon and 2 at bedtime., Disp: 120 tablet, Rfl: 3    aspirin 81 mg EC tablet, TAKE 1 TABLET BY MOUTH ONCE DAILY., Disp: 90 tablet, Rfl: 1    atorvastatin (Lipitor) 80 mg tablet, TAKE 1 TABLET BY MOUTH ONCE DAILY., Disp: 90 tablet, Rfl: 1    BERBERINE CHLORIDE ORAL, Take by mouth., Disp: , Rfl:     biotin 5 mg capsule, Take 1 capsule (5 mg) by mouth once daily., Disp: , Rfl:     cholecalciferol (Vitamin D-3) 25 MCG (1000 UT) capsule, Take 1 capsule (25 mcg) by mouth once daily., Disp: , Rfl:     co-enzyme Q-10 30 mg capsule, Take 1 capsule (30 mg) by mouth once daily., Disp: , Rfl:     hydrALAZINE (Apresoline) 25 mg tablet, TAKE 1 TABLET BY MOUTH TWICE A DAY, Disp: 180 tablet, Rfl: 1    lisinopril 20 mg tablet, TAKE 1 TABLET BY MOUTH TWICE A DAY, Disp: 180 tablet, Rfl: 1    magnesium 200 mg tablet, Take 1 tablet (200 mg) by mouth once daily., Disp: , Rfl:     metoprolol succinate XL (Toprol-XL) 100 mg 24 hr tablet, TAKE 1 TABLET BY MOUTH EVERY DAY, Disp: 90 tablet, Rfl: 1    nitroglycerin (Nitrostat) 0.4 mg SL tablet, Place 1 tablet (0.4 mg) under the tongue every 5 minutes if needed for chest pain., Disp: , Rfl:     NON FORMULARY, Miko calm, Disp: , Rfl:     taurine 500 mg capsule, Take 500 capsules by mouth once daily., Disp: , Rfl:

## 2025-06-24 ENCOUNTER — APPOINTMENT (OUTPATIENT)
Dept: OPHTHALMOLOGY | Facility: CLINIC | Age: 55
End: 2025-06-24
Payer: COMMERCIAL

## 2025-06-24 DIAGNOSIS — G93.2 IIH (IDIOPATHIC INTRACRANIAL HYPERTENSION): Primary | ICD-10-CM

## 2025-06-24 PROCEDURE — 92083 EXTENDED VISUAL FIELD XM: CPT | Performed by: PSYCHIATRY & NEUROLOGY

## 2025-06-24 PROCEDURE — 92133 CPTRZD OPH DX IMG PST SGM ON: CPT | Performed by: PSYCHIATRY & NEUROLOGY

## 2025-06-24 PROCEDURE — 99214 OFFICE O/P EST MOD 30 MIN: CPT | Performed by: PSYCHIATRY & NEUROLOGY

## 2025-06-24 ASSESSMENT — TONOMETRY: IOP_METHOD: TONOPEN

## 2025-06-24 ASSESSMENT — VISUAL ACUITY
OS_PH_CC: 20/25
OS_CC+: -1
OS_CC: 20/30
CORRECTION_TYPE: GLASSES
OD_CC+: -1
OS_PH_CC+: -2
METHOD: SNELLEN - LINEAR
OD_CC: 20/20

## 2025-06-24 ASSESSMENT — SLIT LAMP EXAM - LIDS
COMMENTS: NORMAL
COMMENTS: NORMAL

## 2025-06-24 ASSESSMENT — ENCOUNTER SYMPTOMS
NEUROLOGICAL NEGATIVE: 0
RESPIRATORY NEGATIVE: 0
PSYCHIATRIC NEGATIVE: 0
ENDOCRINE NEGATIVE: 0
HEMATOLOGIC/LYMPHATIC NEGATIVE: 0
MUSCULOSKELETAL NEGATIVE: 0
ALLERGIC/IMMUNOLOGIC NEGATIVE: 0
GASTROINTESTINAL NEGATIVE: 0
EYES NEGATIVE: 1
CARDIOVASCULAR NEGATIVE: 0
CONSTITUTIONAL NEGATIVE: 0

## 2025-06-24 ASSESSMENT — REFRACTION_WEARINGRX
OS_CYLINDER: -1.50
SPECS_TYPE: PAL
OS_AXIS: 140
OS_ADD: +2.00
OD_ADD: +2.00
OD_AXIS: 009
OD_CYLINDER: -1.50
OD_SPHERE: +0.75
OS_SPHERE: +0.50

## 2025-06-24 ASSESSMENT — CONF VISUAL FIELD
METHOD: COUNTING FINGERS
OD_INFERIOR_NASAL_RESTRICTION: 0
OS_INFERIOR_NASAL_RESTRICTION: 0
OD_INFERIOR_TEMPORAL_RESTRICTION: 0
OS_INFERIOR_TEMPORAL_RESTRICTION: 0
OS_NORMAL: 1
OS_SUPERIOR_TEMPORAL_RESTRICTION: 0
OD_NORMAL: 1
OS_SUPERIOR_NASAL_RESTRICTION: 0
OD_SUPERIOR_NASAL_RESTRICTION: 0
OD_SUPERIOR_TEMPORAL_RESTRICTION: 0

## 2025-06-24 ASSESSMENT — CUP TO DISC RATIO
OD_RATIO: 0.2
OS_RATIO: 0.2

## 2025-06-24 ASSESSMENT — EXTERNAL EXAM - LEFT EYE: OS_EXAM: NORMAL

## 2025-06-24 ASSESSMENT — EXTERNAL EXAM - RIGHT EYE: OD_EXAM: NORMAL

## 2025-06-24 NOTE — PROGRESS NOTES
"Assessment and Plan    05/16/2025 MRV head, which I personally reviewed, shows no lesion.    04/03/2025 CTA head & neck, which I personally reviewed previously, shows no aneurysm and by report \"1.  Negative cervical CTA for significant flow-limiting stenosis or dissection.  2. There is focal narrowing on the order of 80% involving the right  V4 segment.  3. Atherosclerotic changes are present within the cavernous segments  of the internal carotid arteries with areas of narrowing approaching  approximately 50%.\"  10/11/2024 CT head without contrast, which I personally reviewed previously, shows no lesion.  07/29/2024 MRV head, which I personally reviewed previously, shows distal transverse sinus narrowing.  05/23/2024 CT head without contrast, which I personally reviewed previously, shows   02/16/2024 ultrasound duplex carotid arteries, by report, shows no lesion.  05/21/2024 MRI brain with contrast, by report from [facility], shows \"No sellar, parasellar, or suprasellar mass.     Empty sella, which can be seen in idiopathic intracranial hypertension. Correlation with ophthalmologic examination is recommended. \"  Prior head imaging.    10/16/2024 lumbar puncture opening pressure 27 cm water, tube 1: RBC 88, WBC 21, tube 4: RBC 7 & WBC 1, protein 46 & glucose 73. Meningitis PCR panel negative.    06/24/2025 OCT RNFL OD 87 & OS 86. (Stable)  04/17/2025 OCT RNFL OD 88 & OS 86. (Stable)  01/06/2025 OCT RNFL OD 89 & OS 86. (Stable)  09/24/2024 OCT RNFL OD 89 with borderline S thinning & OS 86 with borderline S thinning.    06/24/2025 HVF 24-2 OD fovea 36, questionable inferior nasal step MD -2.31 & OS fovea 30, general reduction MD -5.70.  04/17/2025 HVF 24-2 OD fovea 47, FL 6/15, FP 9%, FN 8%, scatter, possible arcuate defects MD -3.52 & OS fovea 34, FL 1/15, FP 2%, FN 2%, general reduction MD -5.52.  01/06/2025 HVF 24-2 OD fovea 47, FL 6/15, FP 9%, FN 8%, scatter, possible arcuate defects MD -3.52 & OS fovea 34, general " reduction MD -5.52.    This 54 year-old woman with a history of idiopathic intracranial hypertension, DM2, HTN, HLD, bilateral carotid stenosis, KATEY, obesity, CAD, CHF presents in follow up for evaluation of idiopathic intracranial hypertension.    Symptoms are better. The vision seems overall good with questionable Ribeiro visual field (HVF) defects. Funduscopy and OCT retinal nerve fiber layer are stable. I suspect improved idiopathic intracranial hypertension without sign of venous sinus thrombosis on recent scan and without reason to suspect intracranial mass.    Plan    Continue acetazolamide 500/250/500 mg PO BID.  Continue successful weight loss efforts.    Follow up in 4 months with HVF & OCT. (dilated 9/24/2024)

## 2025-06-24 NOTE — LETTER
"June 24, 2025     Priti Lr MD  [address]  [facility] [address]    Patient: Lory Taylor   YOB: 1970   Date of Visit: 6/24/2025     Dear Dr. Priti Lr MD:    I am writing to share my findings regarding our shared patient Lory Taylor from her visit with me on 6/24/2025.    HPI    This 54 year-old woman with a history of idiopathic intracranial hypertension, DM2, HTN, HLD, bilateral carotid stenosis, KATEY, obesity, CAD, CHF presents in follow up for evaluation of idiopathic intracranial hypertension.    She saw headache neurologist Dr. Priti Lr 4/30/2025 with increased acetazolamide, 500 morning, 250 midday and 500 mg evening.    The patient reports the following regarding associated symptoms: headaches: infrequent, but still sometimes with frontal pressure, pulsatile tinnitus: less than prior, transient visual obscurations: no, & diplopia: no.     Headaches previously were daily, now about 5 times per month.  Last edited by Mark Easton MD PhD on 6/24/2025 11:54 AM.        Diagnoses    Diagnoses and all orders for this visit:  IIH (idiopathic intracranial hypertension) (Primary)  -     OCT, Optic Nerve - OU - Both Eyes  -     Ribeiro Visual Field - OU - Both Eyes    Assessment and Plan    05/16/2025 MRV head, which I personally reviewed, shows no lesion.    04/03/2025 CTA head & neck, which I personally reviewed previously, shows no aneurysm and by report \"1.  Negative cervical CTA for significant flow-limiting stenosis or dissection.  2. There is focal narrowing on the order of 80% involving the right  V4 segment.  3. Atherosclerotic changes are present within the cavernous segments  of the internal carotid arteries with areas of narrowing approaching  approximately 50%.\"  10/11/2024 CT head without contrast, which I personally reviewed previously, shows no lesion.  07/29/2024 MRV head, which I personally reviewed previously, shows distal transverse sinus " "narrowing.  05/23/2024 CT head without contrast, which I personally reviewed previously, shows   02/16/2024 ultrasound duplex carotid arteries, by report, shows no lesion.  05/21/2024 MRI brain with contrast, by report from [facility], shows \"No sellar, parasellar, or suprasellar mass.     Empty sella, which can be seen in idiopathic intracranial hypertension. Correlation with ophthalmologic examination is recommended. \"  Prior head imaging.    10/16/2024 lumbar puncture opening pressure 27 cm water, tube 1: RBC 88, WBC 21, tube 4: RBC 7 & WBC 1, protein 46 & glucose 73. Meningitis PCR panel negative.    06/24/2025 OCT RNFL OD 87 & OS 86. (Stable)  04/17/2025 OCT RNFL OD 88 & OS 86. (Stable)  01/06/2025 OCT RNFL OD 89 & OS 86. (Stable)  09/24/2024 OCT RNFL OD 89 with borderline S thinning & OS 86 with borderline S thinning.    06/24/2025 HVF 24-2 OD fovea 36, questionable inferior nasal step MD -2.31 & OS fovea 30, general reduction MD -5.70.  04/17/2025 HVF 24-2 OD fovea 47, FL 6/15, FP 9%, FN 8%, scatter, possible arcuate defects MD -3.52 & OS fovea 34, FL 1/15, FP 2%, FN 2%, general reduction MD -5.52.  01/06/2025 HVF 24-2 OD fovea 47, FL 6/15, FP 9%, FN 8%, scatter, possible arcuate defects MD -3.52 & OS fovea 34, general reduction MD -5.52.    This 54 year-old woman with a history of idiopathic intracranial hypertension, DM2, HTN, HLD, bilateral carotid stenosis, KATEY, obesity, CAD, CHF presents in follow up for evaluation of idiopathic intracranial hypertension.    Symptoms are better. The vision seems overall good with questionable Ribeiro visual field (HVF) defects. Funduscopy and OCT retinal nerve fiber layer are stable. I suspect improved idiopathic intracranial hypertension without sign of venous sinus thrombosis on recent scan and without reason to suspect intracranial mass.    Plan    Continue acetazolamide 500/250/500 mg PO BID.  Continue successful weight loss efforts.    Follow up in 4 months with HVF & " OCT. (dilated 9/24/2024)      Below you will find my full examination. I appreciate the opportunity to see Lory Taylor today and to share in her care with you. Please contact me if you have questions for me regarding this visit or if I can be of assistance to another of your patients with neuro-ophthalmological problems.    Sincerely,    Mark Easton MD PhD    CC:   No Recipients      Base Eye Exam       Visual Acuity (Snellen - Linear)         Right Left    Dist cc 20/20 -1 20/30 -1    Dist ph cc  20/25 -2      Correction: Glasses              Tonometry (Tonopen, 10:46 AM)         Right Left    Pressure 32squeezing 33squeezing              Pupils         Pupils    Right PERRL, No APD    Left PERRL, No APD              Visual Fields (Counting fingers)         Left Right     Full Full              Neuro/Psych       Oriented x3: Yes    Mood/Affect: Normal                  Additional Tests       Color         Right Left    Ishihara 11/11 11/11                  Slit Lamp and Fundus Exam       External Exam         Right Left    External Normal Normal              Slit Lamp Exam         Right Left    Lids/Lashes Normal Normal    Conjunctiva/Sclera White and quiet White and quiet    Cornea Clear Clear    Anterior Chamber Deep and quiet Deep and quiet    Iris Round and reactive Round and reactive    Lens Trace Nuclear sclerosis, Trace Cortical cataract Trace Nuclear sclerosis, Trace Cortical cataract    Anterior Vitreous Normal Normal              Fundus Exam         Right Left    Disc grade 1 elevation, nasal high water jermaine grade 1 elevation    C/D Ratio 0.2 0.2    Macula Normal Normal    Vessels Normal Normal    Periphery Normal Normal                  Refraction       Wearing Rx         Sphere Cylinder Axis Add    Right +0.75 -1.50 009 +2.00    Left +0.50 -1.50 140 +2.00      Age: 3yrs    Type: PAL

## 2025-07-12 DIAGNOSIS — R09.81 NASAL CONGESTION: ICD-10-CM

## 2025-07-12 DIAGNOSIS — J34.89 SINUS DRAINAGE: ICD-10-CM

## 2025-07-14 RX ORDER — IPRATROPIUM BROMIDE 21 UG/1
SPRAY, METERED NASAL
Qty: 30 ML | Refills: 2 | Status: SHIPPED | OUTPATIENT
Start: 2025-07-14

## 2025-07-28 ENCOUNTER — APPOINTMENT (OUTPATIENT)
Dept: INTEGRATIVE MEDICINE | Facility: CLINIC | Age: 55
End: 2025-07-28
Payer: COMMERCIAL

## 2025-08-01 DIAGNOSIS — I10 PRIMARY HYPERTENSION: ICD-10-CM

## 2025-08-01 DIAGNOSIS — E78.2 MIXED HYPERLIPIDEMIA: ICD-10-CM

## 2025-08-02 RX ORDER — METOPROLOL SUCCINATE 100 MG/1
100 TABLET, EXTENDED RELEASE ORAL DAILY
Qty: 90 TABLET | Refills: 1 | Status: SHIPPED | OUTPATIENT
Start: 2025-08-02

## 2025-08-02 RX ORDER — ATORVASTATIN CALCIUM 80 MG/1
80 TABLET, FILM COATED ORAL DAILY
Qty: 90 TABLET | Refills: 1 | Status: SHIPPED | OUTPATIENT
Start: 2025-08-02

## 2025-08-04 ENCOUNTER — APPOINTMENT (OUTPATIENT)
Dept: CARDIOLOGY | Facility: CLINIC | Age: 55
End: 2025-08-04
Payer: COMMERCIAL

## 2025-08-04 VITALS
WEIGHT: 213 LBS | DIASTOLIC BLOOD PRESSURE: 72 MMHG | OXYGEN SATURATION: 99 % | HEIGHT: 63 IN | HEART RATE: 63 BPM | SYSTOLIC BLOOD PRESSURE: 124 MMHG | BODY MASS INDEX: 37.74 KG/M2

## 2025-08-04 DIAGNOSIS — R00.2 PALPITATIONS: ICD-10-CM

## 2025-08-04 DIAGNOSIS — Z98.61 CAD S/P PERCUTANEOUS CORONARY ANGIOPLASTY: Primary | ICD-10-CM

## 2025-08-04 DIAGNOSIS — I10 PRIMARY HYPERTENSION: ICD-10-CM

## 2025-08-04 DIAGNOSIS — I11.9 HYPERTENSIVE HEART DISEASE WITHOUT CONGESTIVE HEART FAILURE: ICD-10-CM

## 2025-08-04 DIAGNOSIS — G47.33 OSA (OBSTRUCTIVE SLEEP APNEA): ICD-10-CM

## 2025-08-04 DIAGNOSIS — I25.10 CAD S/P PERCUTANEOUS CORONARY ANGIOPLASTY: Primary | ICD-10-CM

## 2025-08-04 DIAGNOSIS — E78.2 MIXED HYPERLIPIDEMIA: ICD-10-CM

## 2025-08-04 DIAGNOSIS — I50.20 ACC/AHA STAGE C SYSTOLIC HEART FAILURE: ICD-10-CM

## 2025-08-04 DIAGNOSIS — E11.9 TYPE 2 DIABETES MELLITUS WITHOUT COMPLICATION, UNSPECIFIED WHETHER LONG TERM INSULIN USE: ICD-10-CM

## 2025-08-04 DIAGNOSIS — I25.10 CORONARY ARTERY DISEASE INVOLVING NATIVE CORONARY ARTERY OF NATIVE HEART WITHOUT ANGINA PECTORIS: ICD-10-CM

## 2025-08-04 DIAGNOSIS — R42 DIZZINESS: ICD-10-CM

## 2025-08-04 DIAGNOSIS — I65.23 OCCLUSION AND STENOSIS OF BILATERAL CAROTID ARTERIES: ICD-10-CM

## 2025-08-04 PROCEDURE — 99212 OFFICE O/P EST SF 10 MIN: CPT

## 2025-08-04 PROCEDURE — 4010F ACE/ARB THERAPY RXD/TAKEN: CPT | Performed by: STUDENT IN AN ORGANIZED HEALTH CARE EDUCATION/TRAINING PROGRAM

## 2025-08-04 PROCEDURE — 3074F SYST BP LT 130 MM HG: CPT | Performed by: STUDENT IN AN ORGANIZED HEALTH CARE EDUCATION/TRAINING PROGRAM

## 2025-08-04 PROCEDURE — 3078F DIAST BP <80 MM HG: CPT | Performed by: STUDENT IN AN ORGANIZED HEALTH CARE EDUCATION/TRAINING PROGRAM

## 2025-08-04 PROCEDURE — 1036F TOBACCO NON-USER: CPT | Performed by: STUDENT IN AN ORGANIZED HEALTH CARE EDUCATION/TRAINING PROGRAM

## 2025-08-04 PROCEDURE — 99214 OFFICE O/P EST MOD 30 MIN: CPT | Performed by: STUDENT IN AN ORGANIZED HEALTH CARE EDUCATION/TRAINING PROGRAM

## 2025-08-04 PROCEDURE — 3008F BODY MASS INDEX DOCD: CPT | Performed by: STUDENT IN AN ORGANIZED HEALTH CARE EDUCATION/TRAINING PROGRAM

## 2025-08-04 RX ORDER — FOLIC ACID 1 MG/1
TABLET ORAL DAILY
COMMUNITY

## 2025-08-04 NOTE — PATIENT INSTRUCTIONS
Please continue current cardiac medications including acetazolamide 500 mg twice daily, aspirin 81 mg, atorvastatin 80 mg daily, hydralazine 25 mg twice daily, lisinopril 20 mg twice daily, metoprolol succinate 100 mg daily, sublingual nitroglycerin as needed.    Please followup with me in Cardiology clinic as scheduled 2/10/2026.  Please return to clinic sooner or seek emergent care if your symptoms reoccur or worsen.

## 2025-08-04 NOTE — PROGRESS NOTES
Follow-up visit    HPI:    Lory Taylor is a 54 y.o. female with pertinent history of sleep apnea, tinnitus, type 2 diabetes, idiopathic intracranial hypertension/ pseudotumor cerebri, obstructive coronary artery disease status post PCI to LAD June 2018 with history of recovered ischemic cardiomyopathy, history acute on chronic diastolic heart failure, no obstructive disease on carotid Dopplers performed 2/16/2024, preserved LVEF 67% with grade 2 diastolic dysfunction, moderate left atrial enlargement, mild aortic regurgitation on echo performed 2/11/2025, average heart rate of 63 bpm with 2 episodes of paroxysmal supraventricular tachycardia up to 3 beats on event monitor performed March 2025 presents to cardiology clinic for follow up.     She is doing relatively well.  Blood pressure has been well-controlled.  She continues to have some occasional palpitations.  They do not seem to interfere with daily living but are an annoyance.  We did review discuss her prior event monitor as well as echocardiogram.  She is concerned that perhaps Diamox is exacerbating palpitations.  Of note she had relatively few short SVT episodes on her last monitor.  Does not appear that we have significant room to go up on her beta-blocker further nor does she want to.  She is compliant with her medications.  Dyspnea exertion is stable.  No significant chest pain episodes.  No exacerbating or relieving factors.   Pt denies orthopnea, and paroxysmal nocturnal dyspnea.  Pt denies worsening lower extremity edema.  Pt denies syncope.  No recent falls.  No fever or chills.  No cough.  No change in bowel or bladder habits.  No sick contacts.  No recent travel.    12 point review of systems including (Constitutional, Eyes, ENMT, Respiratory, Cardiac, Gastrointestinal, Neurological, Psychiatric, and Hematologic) was performed and is otherwise negative.    Past medical history:  As above.    Medications were reviewed.    Allergies were  reviewed.    Family history:  No sudden cardiac death or premature coronary artery disease.     Social history reviewed:   reports that she has never smoked. She has never been exposed to tobacco smoke. She has never used smokeless tobacco. She reports that she does not drink alcohol and does not use drugs.     Allergies reviewed: Penicillins     Medications reviewed:   Current Outpatient Medications   Medication Instructions    acetaZOLAMIDE (Diamox) 250 mg tablet 2 in the AM 1 noon and 2 at bedtime.    aspirin 81 mg, oral, Daily    atorvastatin (LIPITOR) 80 mg, oral, Daily    BERBERINE CHLORIDE ORAL Take by mouth.    biotin 5 mg, Daily    cholecalciferol (VITAMIN D-3) 25 mcg, Daily    co-enzyme Q-10 30 mg, Daily    folic acid (Folvite) 1 mg tablet Daily    hydrALAZINE (APRESOLINE) 25 mg, oral, 2 times daily    ipratropium (Atrovent) 21 mcg (0.03 %) nasal spray ADMINISTER 2 SPRAYS INTO EACH NOSTRIL 3 TIMES A DAY AS NEEDED.    lisinopril 20 mg, oral, 2 times daily    magnesium 200 mg tablet 1 tablet, Daily    metoprolol succinate XL (TOPROL-XL) 100 mg, oral, Daily    nitroglycerin (NITROSTAT) 0.4 mg, Every 5 min PRN    NON FORMULARY Miko calm    taurine 500 mg capsule 500 capsules, Daily    triamcinolone (Nasacort) 55 mcg nasal inhaler 2 sprays, Each Nostril, Daily        Vitals reviewed: Visit Vitals  /72 (BP Location: Left arm, Patient Position: Sitting, BP Cuff Size: Large adult)   Pulse 63           Physical Exam:   General:  Patient is awake, alert, and oriented.  Patient is in no acute distress.  HEENT:  Pupils equal and reactive.  Normocephalic.  Moist mucosa.    Neck:  No thyromegaly.  Normal Jugular Venous Pressure.  Cardiovascular:  Regular rate and rhythm.  Normal S1 and S2.  1/6 MATTHEW.  Pulmonary:  Clear to auscultation bilaterally.  Abdomen:  Soft. Non-tender.   Non-distended.  Positive bowel sounds.  Lower Extremities:  2+ pedal pulses. No LE edema.  Neurologic:  Cranial nerves intact.  No focal  deficit.   Skin: Skin warm and dry, normal skin turgor.   Psychiatric: Normal affect.    Last Labs:  CBC -      Lab Results   Component Value Date    WBC 5.3 04/03/2025    WBC 5.6 03/17/2025    HGB 13.4 04/03/2025    HGB 13.8 03/17/2025    HCT 40.6 04/03/2025    HCT 43.0 03/17/2025     04/03/2025     03/17/2025        CMP-  Lab Results   Component Value Date    GLUCOSE 150 (H) 04/03/2025    GLUCOSE 131 (H) 03/17/2025     (L) 04/03/2025     03/17/2025    K 4.4 04/03/2025    K 3.5 03/17/2025     (H) 04/03/2025     03/17/2025    CO2 20 (L) 04/03/2025    CO2 20 03/17/2025    ANIONGAP 11 04/03/2025    ANIONGAP 9 03/17/2025    BUN 22 04/03/2025    BUN 19 03/17/2025    CREATININE 0.95 04/03/2025    CREATININE 0.85 03/17/2025    EGFR 72 04/03/2025    EGFR 82 03/17/2025    CALCIUM 8.6 04/03/2025    CALCIUM 8.8 03/17/2025    PHOS 2.6 06/28/2018    PROT 6.9 04/03/2025    PROT 6.6 03/17/2025    ALBUMIN 3.7 04/03/2025    ALBUMIN 4.1 03/17/2025    AST 16 04/03/2025    AST 17 03/17/2025    ALT 13 04/03/2025    ALT 15 03/17/2025    ALKPHOS 70 04/03/2025    ALKPHOS 81 03/17/2025    BILITOT 0.4 04/03/2025    BILITOT 0.5 03/17/2025        LIPIDS-  Lab Results   Component Value Date    CHOL 107 03/17/2025    TRIG 41 03/17/2025    HDL 40 (L) 03/17/2025    CHHDL 2.7 03/17/2025    VLDL 9 09/16/2024        OTHERS-  Lab Results   Component Value Date    HGBA1C 6.3 (H) 03/17/2025    BNP 30 11/28/2023        I personally reviewed the patient's recent vitals, labs, medications, orders, EKGs, pertinent cardiac imaging/ echocardiography and ischemic evaluations including stress testing/ cardiac catheterization.    Assessment and Plan:    Lory HARRINGTON Taylor is a 54 y.o. female with pertinent history of sleep apnea, tinnitus, type 2 diabetes, idiopathic intracranial hypertension/ pseudotumor cerebri, obstructive coronary artery disease status post PCI to LAD June 2018 with history of recovered ischemic  cardiomyopathy, history acute on chronic diastolic heart failure, no obstructive disease on carotid Dopplers performed 2/16/2024, preserved LVEF 67% with grade 2 diastolic dysfunction, moderate left atrial enlargement, mild aortic regurgitation on echo performed 2/11/2025, average heart rate of 63 bpm with 2 episodes of paroxysmal supraventricular tachycardia up to 3 beats on event monitor performed March 2025 presents to cardiology clinic for follow up.  She is doing relatively well.  Blood pressure has been well-controlled.  She continues to have some occasional palpitations.  They do not seem to interfere with daily living but are an annoyance.  We did review discuss her prior event monitor as well as echocardiogram.  She is concerned that perhaps Diamox is exacerbating palpitations.  Of note she had relatively few short SVT episodes on her last monitor.  Does not appear that we have significant room to go up on her beta-blocker further nor does she want to.  She is compliant with her medications.  Dyspnea exertion is stable.  No significant chest pain episodes.      Please continue current cardiac medications including acetazolamide 500 mg twice daily, aspirin 81 mg, atorvastatin 80 mg daily, hydralazine 25 mg twice daily, lisinopril 20 mg twice daily, metoprolol succinate 100 mg daily, sublingual nitroglycerin as needed.    Please followup with me in Cardiology clinic as scheduled 2/10/2026.  Please return to clinic sooner or seek emergent care if your symptoms reoccur or worsen.    Thank you for allowing me to participate in their care.  Please feel free to call me with any further questions or concerns.        Jonah Flores MD, FAC, [facility]  Division of Cardiovascular Medicine  Medical Director, [facility] [place]  Clinical , [facility] [employer] [facility] [employer]  [email or web address]    Office:  [phone]

## 2025-08-16 DIAGNOSIS — G93.2 IIH (IDIOPATHIC INTRACRANIAL HYPERTENSION): ICD-10-CM

## 2025-08-18 RX ORDER — ACETAZOLAMIDE 250 MG/1
TABLET ORAL
Qty: 120 TABLET | Refills: 3 | Status: SHIPPED | OUTPATIENT
Start: 2025-08-18

## 2025-08-19 ENCOUNTER — APPOINTMENT (OUTPATIENT)
Dept: OTOLARYNGOLOGY | Facility: CLINIC | Age: 55
End: 2025-08-19
Payer: COMMERCIAL

## 2025-08-30 DIAGNOSIS — Z98.61 CAD S/P PERCUTANEOUS CORONARY ANGIOPLASTY: ICD-10-CM

## 2025-08-30 DIAGNOSIS — I25.10 CAD S/P PERCUTANEOUS CORONARY ANGIOPLASTY: ICD-10-CM

## 2025-09-02 RX ORDER — LISINOPRIL 20 MG/1
20 TABLET ORAL 2 TIMES DAILY
Qty: 90 TABLET | Refills: 1 | Status: SHIPPED | OUTPATIENT
Start: 2025-09-02

## 2025-10-21 ENCOUNTER — APPOINTMENT (OUTPATIENT)
Dept: OTOLARYNGOLOGY | Facility: CLINIC | Age: 55
End: 2025-10-21
Payer: COMMERCIAL

## 2025-10-28 ENCOUNTER — APPOINTMENT (OUTPATIENT)
Dept: OPHTHALMOLOGY | Facility: CLINIC | Age: 55
End: 2025-10-28
Payer: COMMERCIAL

## 2026-02-10 ENCOUNTER — APPOINTMENT (OUTPATIENT)
Dept: CARDIOLOGY | Facility: CLINIC | Age: 56
End: 2026-02-10
Payer: COMMERCIAL